# Patient Record
Sex: MALE | Race: WHITE | NOT HISPANIC OR LATINO | Employment: FULL TIME | URBAN - METROPOLITAN AREA
[De-identification: names, ages, dates, MRNs, and addresses within clinical notes are randomized per-mention and may not be internally consistent; named-entity substitution may affect disease eponyms.]

---

## 2017-01-27 DIAGNOSIS — I10 BENIGN ESSENTIAL HYPERTENSION: Primary | ICD-10-CM

## 2017-01-27 NOTE — TELEPHONE ENCOUNTER
Lisinopril 5      Last Written Prescription Date: 08/01/16  Last Fill Quantity: 90, # refills: 1  Last Office Visit with G, P or [facility] prescribing provider: 05/05/16       POTASSIUM   Date Value Ref Range Status   01/09/2016 4.4 3.4 - 5.3 mmol/L Final     CREATININE   Date Value Ref Range Status   01/09/2016 1.01 0.66 - 1.25 mg/dL Final     BP Readings from Last 3 Encounters:   07/09/16 138/85   05/10/16 127/81   05/09/16 165/92       Thank You!  Vesta Saldivar  [facility]  P: [phone] F:[phone]

## 2017-01-30 RX ORDER — LISINOPRIL 5 MG/1
5 TABLET ORAL DAILY
Qty: 90 TABLET | Refills: 0 | Status: SHIPPED | OUTPATIENT
Start: 2017-01-30 | End: 2017-04-17

## 2017-04-17 ENCOUNTER — OFFICE VISIT (OUTPATIENT)
Dept: FAMILY MEDICINE | Facility: CLINIC | Age: 42
End: 2017-04-17
Payer: COMMERCIAL

## 2017-04-17 VITALS
BODY MASS INDEX: 37.3 KG/M2 | WEIGHT: 300 LBS | SYSTOLIC BLOOD PRESSURE: 133 MMHG | TEMPERATURE: 97.9 F | HEIGHT: 75 IN | DIASTOLIC BLOOD PRESSURE: 88 MMHG | HEART RATE: 72 BPM

## 2017-04-17 DIAGNOSIS — Z00.00 ROUTINE HISTORY AND PHYSICAL EXAMINATION OF ADULT: Primary | ICD-10-CM

## 2017-04-17 DIAGNOSIS — Z23 ENCOUNTER FOR IMMUNIZATION: ICD-10-CM

## 2017-04-17 DIAGNOSIS — F43.21 GRIEF REACTION: ICD-10-CM

## 2017-04-17 DIAGNOSIS — I10 BENIGN ESSENTIAL HYPERTENSION: ICD-10-CM

## 2017-04-17 DIAGNOSIS — I10 ESSENTIAL HYPERTENSION: ICD-10-CM

## 2017-04-17 PROCEDURE — 90471 IMMUNIZATION ADMIN: CPT | Performed by: FAMILY MEDICINE

## 2017-04-17 PROCEDURE — 99396 PREV VISIT EST AGE 40-64: CPT | Mod: 25 | Performed by: FAMILY MEDICINE

## 2017-04-17 PROCEDURE — 99213 OFFICE O/P EST LOW 20 MIN: CPT | Mod: 25 | Performed by: FAMILY MEDICINE

## 2017-04-17 PROCEDURE — 90715 TDAP VACCINE 7 YRS/> IM: CPT | Performed by: FAMILY MEDICINE

## 2017-04-17 RX ORDER — LISINOPRIL 5 MG/1
5 TABLET ORAL DAILY
Qty: 90 TABLET | Refills: 3 | Status: SHIPPED | OUTPATIENT
Start: 2017-04-17 | End: 2018-04-26

## 2017-04-17 RX ORDER — METOPROLOL SUCCINATE 50 MG/1
50 TABLET, EXTENDED RELEASE ORAL DAILY
Qty: 90 TABLET | Refills: 3 | Status: SHIPPED | OUTPATIENT
Start: 2017-04-17 | End: 2018-04-26

## 2017-04-17 ASSESSMENT — ANXIETY QUESTIONNAIRES
IF YOU CHECKED OFF ANY PROBLEMS ON THIS QUESTIONNAIRE, HOW DIFFICULT HAVE THESE PROBLEMS MADE IT FOR YOU TO DO YOUR WORK, TAKE CARE OF THINGS AT HOME, OR GET ALONG WITH OTHER PEOPLE: NOT DIFFICULT AT ALL
3. WORRYING TOO MUCH ABOUT DIFFERENT THINGS: MORE THAN HALF THE DAYS
1. FEELING NERVOUS, ANXIOUS, OR ON EDGE: SEVERAL DAYS
5. BEING SO RESTLESS THAT IT IS HARD TO SIT STILL: SEVERAL DAYS
2. NOT BEING ABLE TO STOP OR CONTROL WORRYING: SEVERAL DAYS
GAD7 TOTAL SCORE: 9
6. BECOMING EASILY ANNOYED OR IRRITABLE: MORE THAN HALF THE DAYS
7. FEELING AFRAID AS IF SOMETHING AWFUL MIGHT HAPPEN: NOT AT ALL

## 2017-04-17 ASSESSMENT — PATIENT HEALTH QUESTIONNAIRE - PHQ9: 5. POOR APPETITE OR OVEREATING: MORE THAN HALF THE DAYS

## 2017-04-17 NOTE — NURSING NOTE
"Chief Complaint   Patient presents with     Physical     General physcial exam.     Hypertension     Recheck on blood pressure medications.       Initial /88  Pulse 72  Temp 97.9  F (36.6  C) (Tympanic)  Ht 6' 2.5\" (1.892 m)  Wt 300 lb (136.1 kg)  BMI 38 kg/m2 Estimated body mass index is 38 kg/(m^2) as calculated from the following:    Height as of this encounter: 6' 2.5\" (1.892 m).    Weight as of this encounter: 300 lb (136.1 kg).  Medication Reconciliation: complete  "

## 2017-04-17 NOTE — NURSING NOTE
"Chief Complaint   Patient presents with     Physical     General physcial exam.     Hypertension     Recheck on blood pressure medications.  At home blood pressure readings have ranged around 127/72 to 140/90.     Weight Problem     Has noticed he has gained about 15 pounds in the last 3 months.  He feels he has been eating less.  Has been under stress since January with his father's cancer diagnosis and passing.       Initial /88  Pulse 72  Temp 97.9  F (36.6  C) (Tympanic)  Ht 6' 2.5\" (1.892 m)  Wt 300 lb (136.1 kg)  BMI 38 kg/m2 Estimated body mass index is 38 kg/(m^2) as calculated from the following:    Height as of this encounter: 6' 2.5\" (1.892 m).    Weight as of this encounter: 300 lb (136.1 kg).  Medication Reconciliation: complete  "

## 2017-04-17 NOTE — PROGRESS NOTES
SUBJECTIVE:     CC: Mark Conn is an 41 year old male who presents for preventative health visit.     Healthy Habits:    Do you get at least three servings of calcium containing foods daily (dairy, green leafy vegetables, etc.)? yes    Amount of exercise or daily activities, outside of work: 1 day(s) per week, 30-45 min.    Problems taking medications regularly No    Medication side effects: No    Have you had an eye exam in the past two years? yes    Do you see a dentist twice per year? yes    Do you have sleep apnea, excessive snoring or daytime drowsiness?  Daytime drowsiness.      Chief Complaint   Patient presents with     Physical     General physcial exam.     Hypertension     Recheck on blood pressure medications.  At home blood pressure readings have ranged around 127/72 to 140/90.     Weight Problem     Has noticed he has gained about 15 pounds in the last 3 months.  He feels he has been eating less.  Has been under stress since January with his father's cancer diagnosis and passing.         Today's PHQ-2 Score:   PHQ-2 ( 1999 [employer]) 4/17/2017 4/14/2017   Q1: Little interest or pleasure in doing things 2 -   Q2: Feeling down, depressed or hopeless 2 -   PHQ-2 Score 4 -   Little interest or pleasure in doing things - More than half the days   Feeling down, depressed or hopeless - More than half the days   PHQ-2 Score - 4       Abuse: Current or Past(Physical, Sexual or Emotional)- No  Do you feel safe in your environment - Yes    Social History   Substance Use Topics     Smoking status: Never Smoker     Smokeless tobacco: Never Used     Alcohol use Yes      Comment: Sociallly- not very often.     The patient does not drink >3 drinks per day nor >7 drinks per week.    Last PSA: No results found for: PSA    Recent Labs   Lab Test  03/07/15   0709  11/24/12   0828   CHOL  160  162   HDL  31*  32*   LDL  72  97   TRIG  286*  170*   CHOLHDLRATIO  5.2*  5.0       Reviewed orders with patient. Reviewed  "health maintenance and updated orders accordingly - Yes    Reviewed and updated as needed this visit by clinical staff  Tobacco  Allergies  Med Hx  Surg Hx  Fam Hx  Soc Hx        Reviewed and updated as needed this visit by Provider    ROS:  C: NEGATIVE for fever, chills, change in weight  I: NEGATIVE for worrisome rashes, moles or lesions  E: NEGATIVE for vision changes or irritation  ENT: NEGATIVE for ear, mouth and throat problems  R: NEGATIVE for significant cough or SOB  CV: NEGATIVE for chest pain, palpitations or peripheral edema  GI: NEGATIVE for nausea, abdominal pain, heartburn, or change in bowel habits   male: negative for dysuria, hematuria, decreased urinary stream, erectile dysfunction, urethral discharge  M: NEGATIVE for significant arthralgias or myalgia  N: NEGATIVE for weakness, dizziness or paresthesias  PSYCHIATRIC: he has grief and mood changes from his father's illness and death last month.       OBJECTIVE:     /88  Pulse 72  Temp 97.9  F (36.6  C) (Tympanic)  Ht 6' 2.5\" (1.892 m)  Wt 300 lb (136.1 kg)  BMI 38 kg/m2  EXAM:  GENERAL APPEARANCE: healthy, alert and no distress  GENERAL APPEARANCE: over weight  EYES: EOMI,  PERRL  HENT: ear canals and TM's normal and nose and mouth without ulcers or lesions  NECK: no adenopathy, no asymmetry, masses, or scars and thyroid normal to palpation  RESP: lungs clear to auscultation - no rales, rhonchi or wheezes  CV: regular rates and rhythm, normal S1 S2, no S3 or S4 and no murmur, click or rub -  ABDOMEN:  soft, nontender, no HSM or masses and bowel sounds normal  GU_male: testicles normal without atrophy or masses, no hernias and penis normal without urethral discharge  MS: extremities normal- no gross deformities noted, no evidence of inflammation in joints, FROM in all extremities.  SKIN: no suspicious lesions or rashes  NEURO: Normal strength and tone, sensory exam grossly normal, mentation intact and speech normal  PSYCH: " "mentation appears normal and affect normal/bright  LYMPHATICS: No axillary, cervical, inguinal, or supraclavicular nodes      ASSESSMENT/PLAN:     1. Routine history and physical examination of adult  COUNSELING:  Reviewed preventive health counseling, as reflected in patient instructions       Regular exercise       Healthy diet/nutrition       Vision screening       Hearing screening   reports that he has never smoked. He has never used smokeless tobacco.  Estimated body mass index is 38 kg/(m^2) as calculated from the following:    Height as of this encounter: 6' 2.5\" (1.892 m).    Weight as of this encounter: 300 lb (136.1 kg).   Counseling Resources:  ATP IV Guidelines  Pooled Cohorts Equation Calculator  FRAX Risk Assessment  ICSI Preventive Guidelines  Dietary Guidelines for Americans, 2010  USDA's MyPlate  ASA Prophylaxis  Lung CA Screening   Have your family monitor for sleep apnea, and call our RN if this is occurring and we will order the sleep study.   For the outside BP, take this at rest with a calibrated machine. Avoid stimulants. Exercise and weight loss will help.     2. Grief reaction  We will refer for mental health counseling. Call if desired. We suggest the grief counseling, short term.   Call our clinic at [phone], or your insurance, or [place] central scheduling at [phone].  Avoid alcohol until better.   - MENTAL HEALTH REFERRAL    (I10) Benign essential hypertension  Comment:   Plan: lisinopril (PRINIVIL/ZESTRIL) 5 MG tablet,         **Creatinine FUTURE anytime, **Potassium FUTURE        anytime        Monitor and record the BP readings and the goal for the average is under 130/80. Use the meds and the non drug therapies.   Refill and recheck annually if doing well.     Henry Herrera MD  [facility]  "

## 2017-04-17 NOTE — MR AVS SNAPSHOT
After Visit Summary   4/17/2017    Mark Conn    MRN: [id number]           Patient Information     Date Of Birth          1975        Visit Information        Provider Department      4/17/2017 2:40 PM Henry Herrera MD [facility]        Today's Diagnoses     Routine history and physical examination of adult    -  1    Grief reaction        Benign essential hypertension        Essential hypertension          Care Instructions      Preventive Health Recommendations  Male Ages 40 to 49    Yearly exam:             See your health care provider every year in order to  o   Review health changes.   o   Discuss preventive care.    o   Review your medicines if your doctor has prescribed any.    You should be tested each year for STDs (sexually transmitted diseases) if you re at risk.     Have a cholesterol test every 5 years.     Have a colonoscopy (test for colon cancer) if someone in your family has had colon cancer or polyps before age 50.     After age 45, have a diabetes test (fasting glucose). If you are at risk for diabetes, you should have this test every 3 years.      Talk with your health care provider about whether or not a prostate cancer screening test (PSA) is right for you.    Shots: Get a flu shot each year. Get a tetanus shot every 10 years.     Nutrition:    Eat at least 5 servings of fruits and vegetables daily.     Eat whole-grain bread, whole-wheat pasta and brown rice instead of white grains and rice.     Talk to your provider about Calcium and Vitamin D.     Lifestyle    Exercise for at least 150 minutes a week (30 minutes a day, 5 days a week). This will help you control your weight and prevent disease.     Limit alcohol to one drink per day.     No smoking.     Wear sunscreen to prevent skin cancer.     See your dentist every six months for an exam and cleaning.              Thank you for choosing [facility].  You may be receiving a survey in the  "mail from [employer] regarding your visit today.  Please take a few minutes to complete and return the survey to let us know how we are doing.      If you have questions or concerns, please contact us via [employer] or you can contact your care team at [phone].    Our Clinic hours are:  Monday 6:40 am  to 7:00 pm  Tuesday -Friday 6:40 am to 5:00 pm    The [place] outpatient lab hours are:  Monday - Friday 6:10 am to 4:45 pm  Saturdays 7:00 am to 11:00 am  Appointments are required, call [phone]    If you have clinical questions after hours or would like to schedule an appointment,  call the clinic at [phone].    ASSESSMENT/PLAN:     1. Routine history and physical examination of adult  COUNSELING:  Reviewed preventive health counseling, as reflected in patient instructions       Regular exercise       Healthy diet/nutrition       Vision screening       Hearing screening   reports that he has never smoked. He has never used smokeless tobacco.  Estimated body mass index is 38 kg/(m^2) as calculated from the following:    Height as of this encounter: 6' 2.5\" (1.892 m).    Weight as of this encounter: 300 lb (136.1 kg).   Counseling Resources:  ATP IV Guidelines  Pooled Cohorts Equation Calculator  FRAX Risk Assessment  ICSI Preventive Guidelines  Dietary Guidelines for Americans, 2010  USDA's MyPlate  ASA Prophylaxis  Lung CA Screening   Have your family monitor for sleep apnea, and call our RN if this is occurring and we will order the sleep study.   For the outside BP, take this at rest with a calibrated machine. Avoid stimulants. Exercise and weight loss will help.     2. Grief reaction  We will refer for mental health counseling. Call if desired. We suggest the grief counseling, short term.   Call our clinic at [phone], or your insurance, or [facility] central scheduling at [phone].  Avoid alcohol until better.   - MENTAL HEALTH REFERRAL    (I10) Benign essential hypertension  Comment:   Plan: " lisinopril (PRINIVIL/ZESTRIL) 5 MG tablet,         **Creatinine FUTURE anytime, **Potassium FUTURE        anytime        Monitor and record the BP readings and the goal for the average is under 130/80. Use the meds and the non drug therapies.   Refill and recheck annually if doing well.           Follow-ups after your visit        Additional Services     MENTAL HEALTH REFERRAL       Your provider has referred you to: to be decided    All scheduling is subject to the client's specific insurance plan & benefits, provider/location availability, and provider clinical specialities.  Please arrive 15 minutes early for your first appointment and bring your completed paperwork.    Please be aware that coverage of these services is subject to the terms and limitations of your health insurance plan.  Call member services at your health plan with any benefit or coverage questions.                  Future tests that were ordered for you today     Open Future Orders        Priority Expected Expires Ordered    **Creatinine FUTURE anytime Routine 4/17/2017 4/17/2018 4/17/2017    **Potassium FUTURE anytime Routine 4/17/2017 4/17/2018 4/17/2017    **Glucose FUTURE anytime Routine 4/17/2017 4/17/2018 4/17/2017    **Lipid panel reflex to direct LDL FUTURE anytime Routine 4/17/2017 4/17/2018 4/17/2017    Uric acid Routine 4/17/2017 4/17/2018 4/17/2017            Who to contact     If you have questions or need follow up information about today's clinic visit or your schedule please contact [facility] directly at [phone].  Normal or non-critical lab and imaging results will be communicated to you by [employer], letter or phone within 4 business days after the clinic has received the results. If you do not hear from us within 7 days, please contact the clinic through [employer] or phone. If you have a critical or abnormal lab result, we will notify you by phone as soon as possible.  Submit refill requests through [employer] or  "call your pharmacy and they will forward the refill request to us. Please allow 3 business days for your refill to be completed.          Additional Information About Your Visit        [employer] Information     [employer] gives you secure access to your electronic health record. If you see a primary care provider, you can also send messages to your care team and make appointments. If you have questions, please call your primary care clinic.  If you do not have a primary care provider, please call [phone] and they will assist you.        Care EveryWhere ID     This is your Care EveryWhere ID. This could be used by other organizations to access your [facility] medical records  [id number]        Your Vitals Were     Pulse Temperature Height BMI (Body Mass Index)          72 97.9  F (36.6  C) (Tympanic) 6' 2.5\" (1.892 m) 38 kg/m2         Blood Pressure from Last 3 Encounters:   04/17/17 133/88   07/09/16 138/85   05/10/16 127/81    Weight from Last 3 Encounters:   04/17/17 300 lb (136.1 kg)   07/09/16 293 lb (132.9 kg)   05/20/16 293 lb (132.9 kg)              We Performed the Following     MENTAL HEALTH REFERRAL          Where to get your medicines      These medications were sent to [facility] [address] [address]     Phone:  [phone]     lisinopril 5 MG tablet    metoprolol 50 MG 24 hr tablet          Primary Care Provider Office Phone # Fax #    Henry Herrera [phone]       [facility] [address] [address]        Thank you!     Thank you for choosing [facility]  for your care. Our goal is always to provide you with excellent care. Hearing back from our patients is one way we can continue to improve our services. Please take a few minutes to complete the written survey that you may receive in the mail after your visit with us. Thank you!             Your Updated " Medication List - Protect others around you: Learn how to safely use, store and throw away your medicines at www.disposemymeds.org.          This list is accurate as of: 4/17/17  3:38 PM.  Always use your most recent med list.                   Brand Name Dispense Instructions for use    allopurinol 300 MG tablet    ZYLOPRIM    30 tablet    Take 1 tablet (300 mg) by mouth daily       ibuprofen 800 MG tablet    ADVIL/MOTRIN    60 tablet    Take 1 tablet (800 mg) by mouth 3 times daily (with meals)       indomethacin 50 MG capsule    INDOCIN    42 capsule    Take 1 capsule (50 mg) by mouth 3 times daily (with meals)       lisinopril 5 MG tablet    PRINIVIL/ZESTRIL    90 tablet    Take 1 tablet (5 mg) by mouth daily       metoprolol 50 MG 24 hr tablet    TOPROL-XL    90 tablet    Take 1 tablet (50 mg) by mouth daily       Multi-vitamin Tabs tablet      Take 1 tablet by mouth 2 times daily.       OMEGA-3 FISH OIL PO

## 2017-04-17 NOTE — PATIENT INSTRUCTIONS
Preventive Health Recommendations  Male Ages 40 to 49    Yearly exam:             See your health care provider every year in order to  o   Review health changes.   o   Discuss preventive care.    o   Review your medicines if your doctor has prescribed any.    You should be tested each year for STDs (sexually transmitted diseases) if you re at risk.     Have a cholesterol test every 5 years.     Have a colonoscopy (test for colon cancer) if someone in your family has had colon cancer or polyps before age 50.     After age 45, have a diabetes test (fasting glucose). If you are at risk for diabetes, you should have this test every 3 years.      Talk with your health care provider about whether or not a prostate cancer screening test (PSA) is right for you.    Shots: Get a flu shot each year. Get a tetanus shot every 10 years.     Nutrition:    Eat at least 5 servings of fruits and vegetables daily.     Eat whole-grain bread, whole-wheat pasta and brown rice instead of white grains and rice.     Talk to your provider about Calcium and Vitamin D.     Lifestyle    Exercise for at least 150 minutes a week (30 minutes a day, 5 days a week). This will help you control your weight and prevent disease.     Limit alcohol to one drink per day.     No smoking.     Wear sunscreen to prevent skin cancer.     See your dentist every six months for an exam and cleaning.              Thank you for choosing [facility].  You may be receiving a survey in the mail from [employer] regarding your visit today.  Please take a few minutes to complete and return the survey to let us know how we are doing.      If you have questions or concerns, please contact us via [employer] or you can contact your care team at [phone].    Our Clinic hours are:  Monday 6:40 am  to 7:00 pm  Tuesday -Friday 6:40 am to 5:00 pm    The [place] outpatient lab hours are:  Monday - Friday 6:10 am to 4:45 pm  Saturdays 7:00 am to 11:00 am  Appointments are  "required, call [phone]    If you have clinical questions after hours or would like to schedule an appointment,  call the clinic at [phone].    ASSESSMENT/PLAN:     1. Routine history and physical examination of adult  COUNSELING:  Reviewed preventive health counseling, as reflected in patient instructions       Regular exercise       Healthy diet/nutrition       Vision screening       Hearing screening   reports that he has never smoked. He has never used smokeless tobacco.  Estimated body mass index is 38 kg/(m^2) as calculated from the following:    Height as of this encounter: 6' 2.5\" (1.892 m).    Weight as of this encounter: 300 lb (136.1 kg).   Counseling Resources:  ATP IV Guidelines  Pooled Cohorts Equation Calculator  FRAX Risk Assessment  ICSI Preventive Guidelines  Dietary Guidelines for Americans, 2010  USDA's MyPlate  ASA Prophylaxis  Lung CA Screening   Have your family monitor for sleep apnea, and call our RN if this is occurring and we will order the sleep study.   For the outside BP, take this at rest with a calibrated machine. Avoid stimulants. Exercise and weight loss will help.     2. Grief reaction  We will refer for mental health counseling. Call if desired. We suggest the grief counseling, short term.   Call our clinic at [phone], or your insurance, or [place] central scheduling at [phone].  Avoid alcohol until better.   - MENTAL HEALTH REFERRAL    (I10) Benign essential hypertension  Comment:   Plan: lisinopril (PRINIVIL/ZESTRIL) 5 MG tablet,         **Creatinine FUTURE anytime, **Potassium FUTURE        anytime        Monitor and record the BP readings and the goal for the average is under 130/80. Use the meds and the non drug therapies.   Refill and recheck annually if doing well.     "

## 2017-04-18 ASSESSMENT — ANXIETY QUESTIONNAIRES: GAD7 TOTAL SCORE: 9

## 2017-04-18 ASSESSMENT — PATIENT HEALTH QUESTIONNAIRE - PHQ9: SUM OF ALL RESPONSES TO PHQ QUESTIONS 1-9: 9

## 2017-04-22 DIAGNOSIS — Z00.00 ROUTINE HISTORY AND PHYSICAL EXAMINATION OF ADULT: ICD-10-CM

## 2017-04-22 DIAGNOSIS — F43.21 GRIEF REACTION: ICD-10-CM

## 2017-04-22 DIAGNOSIS — I10 BENIGN ESSENTIAL HYPERTENSION: ICD-10-CM

## 2017-04-22 LAB
ANION GAP SERPL CALCULATED.3IONS-SCNC: 7 MMOL/L (ref 3–14)
BUN SERPL-MCNC: 15 MG/DL (ref 7–30)
CALCIUM SERPL-MCNC: 8.7 MG/DL (ref 8.5–10.1)
CHLORIDE SERPL-SCNC: 103 MMOL/L (ref 94–109)
CHOLEST SERPL-MCNC: 156 MG/DL
CO2 SERPL-SCNC: 27 MMOL/L (ref 20–32)
CREAT SERPL-MCNC: 1.03 MG/DL (ref 0.66–1.25)
GFR SERPL CREATININE-BSD FRML MDRD: 79 ML/MIN/1.7M2
GLUCOSE SERPL-MCNC: 104 MG/DL (ref 70–99)
HDLC SERPL-MCNC: 33 MG/DL
LDLC SERPL CALC-MCNC: 56 MG/DL
NONHDLC SERPL-MCNC: 123 MG/DL
POTASSIUM SERPL-SCNC: 4.1 MMOL/L (ref 3.4–5.3)
SODIUM SERPL-SCNC: 137 MMOL/L (ref 133–144)
TRIGL SERPL-MCNC: 336 MG/DL
URATE SERPL-MCNC: 5.5 MG/DL (ref 3.5–7.2)

## 2017-04-22 PROCEDURE — 80061 LIPID PANEL: CPT | Performed by: FAMILY MEDICINE

## 2017-04-22 PROCEDURE — 84550 ASSAY OF BLOOD/URIC ACID: CPT | Performed by: FAMILY MEDICINE

## 2017-04-22 PROCEDURE — 36415 COLL VENOUS BLD VENIPUNCTURE: CPT | Performed by: FAMILY MEDICINE

## 2017-04-22 PROCEDURE — 80048 BASIC METABOLIC PNL TOTAL CA: CPT | Performed by: FAMILY MEDICINE

## 2017-08-07 ENCOUNTER — OFFICE VISIT (OUTPATIENT)
Dept: FAMILY MEDICINE | Facility: CLINIC | Age: 42
End: 2017-08-07
Payer: COMMERCIAL

## 2017-08-07 VITALS
WEIGHT: 298 LBS | HEIGHT: 75 IN | BODY MASS INDEX: 37.05 KG/M2 | OXYGEN SATURATION: 96 % | DIASTOLIC BLOOD PRESSURE: 75 MMHG | HEART RATE: 76 BPM | TEMPERATURE: 98.3 F | SYSTOLIC BLOOD PRESSURE: 133 MMHG

## 2017-08-07 DIAGNOSIS — R06.09 DYSPNEA ON EXERTION: Primary | ICD-10-CM

## 2017-08-07 PROCEDURE — 99214 OFFICE O/P EST MOD 30 MIN: CPT | Performed by: FAMILY MEDICINE

## 2017-08-07 ASSESSMENT — ANXIETY QUESTIONNAIRES
7. FEELING AFRAID AS IF SOMETHING AWFUL MIGHT HAPPEN: NOT AT ALL
1. FEELING NERVOUS, ANXIOUS, OR ON EDGE: MORE THAN HALF THE DAYS
6. BECOMING EASILY ANNOYED OR IRRITABLE: MORE THAN HALF THE DAYS
2. NOT BEING ABLE TO STOP OR CONTROL WORRYING: SEVERAL DAYS
3. WORRYING TOO MUCH ABOUT DIFFERENT THINGS: SEVERAL DAYS
GAD7 TOTAL SCORE: 10
5. BEING SO RESTLESS THAT IT IS HARD TO SIT STILL: SEVERAL DAYS

## 2017-08-07 ASSESSMENT — PATIENT HEALTH QUESTIONNAIRE - PHQ9
SUM OF ALL RESPONSES TO PHQ QUESTIONS 1-9: 9
5. POOR APPETITE OR OVEREATING: NEARLY EVERY DAY

## 2017-08-07 NOTE — PROGRESS NOTES
SUBJECTIVE:                                                    Mark Conn is a 42 year old male who presents to clinic today for the following health issues:      SHORTNESS OF BREATH      Duration: Has noticed more in the past month.    Description (location/character/radiation): Shortness of breath with exertion.  Can have wheezing at times.  His wife noticed that as his first symptom.  He will feel fatigue where he will need to stop and rest.  This is not normal for him. The symptoms are predictable.     Intensity:  moderate    Accompanying signs and symptoms: Last week with the humidity it felt like he had a heavy/pressure feeling on his chest.  He works outside/inside.  Three times in the past month he will have random, sharp pain in the right rib area, on the backside.  This can feel like a bruise for a few minutes.  One time he was sitting in his chair at home.    Stress-he has been under stress this past year with his father passing away in March.  His father-in-law had a stroke in May.  They area having to drive often to visit him.    History (similar episodes/previous evaluation): None    Precipitating or alleviating factors: Stairs-where he has noticed symptoms the most.  He does deal with different chemicals at work.  They do wear a respirator, but he wants to make sure it is not related.    Therapies tried and outcome: None     Today the PHQ is 9, and the ESTHER is 10.     ESTHER-7   [employer], 2002; Used with Permission) 4/17/2017   1. Feeling nervous, anxious, or on edge 1   2. Not being able to stop or control worrying 1   3. Worrying too much about different things 2   4. Trouble relaxing 2   5. Being so restless that it is hard to sit still 1   6. Becoming easily annoyed or irritable 2   7. Feeling afraid, as if something awful might happen 0   ESTHER-7 Total Score 9   If you checked any problems, how difficult have they made it for you to do your work, take care of things at home, or get along with  "other people? Not difficult at all     PHQ-9 ([employer]) 4/17/2017   1.  Little interest or pleasure in doing things 1   2.  Feeling down, depressed, or hopeless 1   3.  Trouble falling or staying asleep, or sleeping too much 1   4.  Feeling tired or having little energy 2   5.  Poor appetite or overeating 2   6.  Feeling bad about yourself 0   7.  Trouble concentrating 1   8.  Moving slowly or restless 1   9.  Suicidal or self-harm thoughts 0   PHQ-9 Total Score 9   Difficulty at work, home, or with people Not difficult at all         Current Outpatient Prescriptions:      lisinopril (PRINIVIL/ZESTRIL) 5 MG tablet, Take 1 tablet (5 mg) by mouth daily, Disp: 90 tablet, Rfl: 3     metoprolol (TOPROL-XL) 50 MG 24 hr tablet, Take 1 tablet (50 mg) by mouth daily, Disp: 90 tablet, Rfl: 3     allopurinol (ZYLOPRIM) 300 MG tablet, Take 1 tablet (300 mg) by mouth daily, Disp: 30 tablet, Rfl: 11     indomethacin (INDOCIN) 50 MG capsule, Take 1 capsule (50 mg) by mouth 3 times daily (with meals) (Patient taking differently: Take 50 mg by mouth 3 times daily (with meals) ), Disp: 42 capsule, Rfl: 5     Omega-3 Fatty Acids (OMEGA-3 FISH OIL PO), , Disp: , Rfl:      ibuprofen (ADVIL,MOTRIN) 800 MG tablet, Take 1 tablet (800 mg) by mouth 3 times daily (with meals), Disp: 60 tablet, Rfl: 1     multivitamin, therapeutic with minerals (MULTI-VITAMIN) TABS, Take 1 tablet by mouth 2 times daily., Disp: , Rfl:     Patient Active Problem List   Diagnosis     Pain in limb     Colitis     Hypertriglyceridemia     Malabsorption of glucose     CARDIOVASCULAR SCREENING; LDL GOAL LESS THAN 130     Benign essential hypertension     Obesity     Chronic gout without tophus, unspecified cause, unspecified site       Blood pressure 133/75, pulse 76, temperature 98.3  F (36.8  C), temperature source Tympanic, height 6' 2.5\" (1.892 m), weight 298 lb (135.2 kg), SpO2 96 %.    Exam:  GENERAL APPEARANCE: healthy, alert and no distress  EYES: EOMI,  " PERRL  HENT: ear canals and TM's normal and nose and mouth without ulcers or lesions  NECK: no adenopathy, no asymmetry, masses, or scars and thyroid normal to palpation  RESP: lungs clear to auscultation - no rales, rhonchi or wheezes  CV: regular rates and rhythm, normal S1 S2, no S3 or S4 and no murmur, click or rub -  SKIN: no suspicious lesions or rashes  PSYCH: mentation appears normal and affect normal/bright    PFM is 430/640.       (R06.09) Dyspnea on exertion  (primary encounter diagnosis)  Comment:   Plan: Exercise Stress test, Echocardiogram Complete,         General PFT Lab (Please always keep checked),         Pulmonary Function Test        We discussed some of the possible causes. Try to identify triggers as in tobacco smoke, and mold and dust and pollen. Avoid irritants.   We ordered the heart tests and go there now or call [phone]. We will call the results. Call [phone] for the lung tests. If worse in any way recheck in clinic or the Emergency dept.   If all the tests are normal then a CT scan of the chest would be ordered.     Henry Herrera

## 2017-08-07 NOTE — NURSING NOTE
"Chief Complaint   Patient presents with     Shortness of Breath     Has noticed more in the past month.       Initial /75  Pulse 76  Temp 98.3  F (36.8  C) (Tympanic)  Ht 6' 2.5\" (1.892 m)  Wt 298 lb (135.2 kg)  SpO2 96%  BMI 37.75 kg/m2 Estimated body mass index is 37.75 kg/(m^2) as calculated from the following:    Height as of this encounter: 6' 2.5\" (1.892 m).    Weight as of this encounter: 298 lb (135.2 kg).  Medication Reconciliation: complete  "

## 2017-08-07 NOTE — MR AVS SNAPSHOT
After Visit Summary   8/7/2017    Mark Conn    MRN: [id number]           Patient Information     Date Of Birth          1975        Visit Information        Provider Department      8/7/2017 3:00 PM Henry Herrera MD [facility]        Today's Diagnoses     Dyspnea on exertion    -  1      Care Instructions          Thank you for choosing [facility].  You may be receiving a survey in the mail from [facility] regarding your visit today.  Please take a few minutes to complete and return the survey to let us know how we are doing.      If you have questions or concerns, please contact us via LightningBuy or you can contact your care team at [phone].    Our Clinic hours are:  Monday 6:40 am  to 7:00 pm  Tuesday -Friday 6:40 am to 5:00 pm    The [place] outpatient lab hours are:  Monday - Friday 6:10 am to 4:45 pm  Saturdays 7:00 am to 11:00 am  Appointments are required, call [phone]    If you have clinical questions after hours or would like to schedule an appointment,  call the clinic at [phone].  (R06.09) Dyspnea on exertion  (primary encounter diagnosis)  Comment:   Plan: Exercise Stress test, Echocardiogram Complete,         General PFT Lab (Please always keep checked),         Pulmonary Function Test        We discussed some of the possible causes. Try to identify triggers as in tobacco smoke, and mold and dust and pollen. Avoid irritants.   We ordered the heart tests and go there now or call [phone]. We will call the results. Call [phone] for the lung tests. If worse in any way recheck in clinic or the Emergency dept.   If all the tests are normal then a CT scan of the chest would be ordered.             Follow-ups after your visit        Future tests that were ordered for you today     Open Future Orders        Priority Expected Expires Ordered    Exercise Stress test Routine  9/21/2017 8/7/2017    Echocardiogram Complete Routine  8/7/2018  "8/7/2017    General PFT Lab (Please always keep checked) Routine  8/7/2018 8/7/2017    Pulmonary Function Test Routine  8/7/2018 8/7/2017            Who to contact     If you have questions or need follow up information about today's clinic visit or your schedule please contact [facility] directly at [phone].  Normal or non-critical lab and imaging results will be communicated to you by MyChart, letter or phone within 4 business days after the clinic has received the results. If you do not hear from us within 7 days, please contact the clinic through [email or web address] or phone. If you have a critical or abnormal lab result, we will notify you by phone as soon as possible.  Submit refill requests through DriftToIt or call your pharmacy and they will forward the refill request to us. Please allow 3 business days for your refill to be completed.          Additional Information About Your Visit        [email or web address] Information     DriftToIt gives you secure access to your electronic health record. If you see a primary care provider, you can also send messages to your care team and make appointments. If you have questions, please call your primary care clinic.  If you do not have a primary care provider, please call [phone] and they will assist you.        Care EveryWhere ID     This is your Care EveryWhere ID. This could be used by other organizations to access your [place] medical records  [id number]        Your Vitals Were     Pulse Temperature Height Pulse Oximetry BMI (Body Mass Index)       76 98.3  F (36.8  C) (Tympanic) 6' 2.5\" (1.892 m) 96% 37.75 kg/m2        Blood Pressure from Last 3 Encounters:   08/07/17 133/75   04/17/17 133/88   07/09/16 138/85    Weight from Last 3 Encounters:   08/07/17 298 lb (135.2 kg)   04/17/17 300 lb (136.1 kg)   07/09/16 293 lb (132.9 kg)               Primary Care Provider Office Phone # Fax #    Henry Herrera [phone]       [facility] " CENTER [address] [facility] [address]        Equal Access to Services     SILVIA CASAREZ : Hadii aad ku hadkodakraeann Sojuvenal, wabelenda luqlivanha, qairenata kamikeda rufinodionisioaugust, nhung chatmanadenikereyna wesley. So [facility] [phone].    ATENCIÓN: Si habla español, tiene a sweeney disposición servicios gratuitos de asistencia lingüística. Llame al [phone].    We comply with applicable federal civil rights laws and [place] laws. We do not discriminate on the basis of race, color, national origin, age, disability sex, sexual orientation or gender identity.            Thank you!     Thank you for choosing [facility]  for your care. Our goal is always to provide you with excellent care. Hearing back from our patients is one way we can continue to improve our services. Please take a few minutes to complete the written survey that you may receive in the mail after your visit with us. Thank you!             Your Updated Medication List - Protect others around you: Learn how to safely use, store and throw away your medicines at www.disposemymeds.org.          This list is accurate as of: 8/7/17  3:35 PM.  Always use your most recent med list.                   Brand Name Dispense Instructions for use Diagnosis    allopurinol 300 MG tablet    ZYLOPRIM    30 tablet    Take 1 tablet (300 mg) by mouth daily    Chronic gout without tophus, unspecified cause, unspecified site       ibuprofen 800 MG tablet    ADVIL/MOTRIN    60 tablet    Take 1 tablet (800 mg) by mouth 3 times daily (with meals)    Cervical radiculopathy       indomethacin 50 MG capsule    INDOCIN    42 capsule    Take 1 capsule (50 mg) by mouth 3 times daily (with meals)    Gout       lisinopril 5 MG tablet    PRINIVIL/ZESTRIL    90 tablet    Take 1 tablet (5 mg) by mouth daily    Benign essential hypertension       metoprolol 50 MG 24 hr tablet    TOPROL-XL    90 tablet    Take 1 tablet (50 mg) by mouth daily    Essential hypertension        Multi-vitamin Tabs tablet      Take 1 tablet by mouth 2 times daily.        OMEGA-3 FISH OIL PO

## 2017-08-07 NOTE — PATIENT INSTRUCTIONS
Thank you for choosing [facility].  You may be receiving a survey in the mail from [facility] regarding your visit today.  Please take a few minutes to complete and return the survey to let us know how we are doing.      If you have questions or concerns, please contact us via Guaranteach or you can contact your care team at [phone].    Our Clinic hours are:  Monday 6:40 am  to 7:00 pm  Tuesday -Friday 6:40 am to 5:00 pm    The [place] outpatient lab hours are:  Monday - Friday 6:10 am to 4:45 pm  Saturdays 7:00 am to 11:00 am  Appointments are required, call [phone]    If you have clinical questions after hours or would like to schedule an appointment,  call the clinic at [phone].  (R06.09) Dyspnea on exertion  (primary encounter diagnosis)  Comment:   Plan: Exercise Stress test, Echocardiogram Complete,         General PFT Lab (Please always keep checked),         Pulmonary Function Test        We discussed some of the possible causes. Try to identify triggers as in tobacco smoke, and mold and dust and pollen. Avoid irritants.   We ordered the heart tests and go there now or call [phone]. We will call the results. Call [phone] for the lung tests. If worse in any way recheck in clinic or the Emergency dept.   If all the tests are normal then a CT scan of the chest would be ordered.

## 2017-08-08 ASSESSMENT — ANXIETY QUESTIONNAIRES: GAD7 TOTAL SCORE: 10

## 2017-08-17 ENCOUNTER — HOSPITAL ENCOUNTER (OUTPATIENT)
Dept: RESPIRATORY THERAPY | Facility: CLINIC | Age: 42
End: 2017-08-17
Attending: FAMILY MEDICINE
Payer: COMMERCIAL

## 2017-08-17 ENCOUNTER — HOSPITAL ENCOUNTER (OUTPATIENT)
Dept: CARDIOLOGY | Facility: CLINIC | Age: 42
Discharge: HOME | End: 2017-08-17
Attending: FAMILY MEDICINE | Admitting: FAMILY MEDICINE
Payer: COMMERCIAL

## 2017-08-17 DIAGNOSIS — R06.09 DYSPNEA ON EXERTION: ICD-10-CM

## 2017-08-17 LAB — HGB BLD-MCNC: 16.5 G/DL (ref 13.3–17.7)

## 2017-08-17 PROCEDURE — 94060 EVALUATION OF WHEEZING: CPT

## 2017-08-17 PROCEDURE — 94726 PLETHYSMOGRAPHY LUNG VOLUMES: CPT

## 2017-08-17 PROCEDURE — 85018 HEMOGLOBIN: CPT | Performed by: FAMILY MEDICINE

## 2017-08-17 PROCEDURE — 25500064 ZZH RX 255 OP 636: Performed by: FAMILY MEDICINE

## 2017-08-17 PROCEDURE — 93017 CV STRESS TEST TRACING ONLY: CPT

## 2017-08-17 PROCEDURE — 93018 CV STRESS TEST I&R ONLY: CPT | Performed by: INTERNAL MEDICINE

## 2017-08-17 PROCEDURE — 93016 CV STRESS TEST SUPVJ ONLY: CPT | Performed by: INTERNAL MEDICINE

## 2017-08-17 PROCEDURE — 94726 PLETHYSMOGRAPHY LUNG VOLUMES: CPT | Mod: 26 | Performed by: INTERNAL MEDICINE

## 2017-08-17 PROCEDURE — 94729 DIFFUSING CAPACITY: CPT | Mod: 26 | Performed by: INTERNAL MEDICINE

## 2017-08-17 PROCEDURE — 94060 EVALUATION OF WHEEZING: CPT | Mod: 26 | Performed by: INTERNAL MEDICINE

## 2017-08-17 PROCEDURE — 25000125 ZZHC RX 250: Performed by: FAMILY MEDICINE

## 2017-08-17 PROCEDURE — 93306 TTE W/DOPPLER COMPLETE: CPT | Mod: 26 | Performed by: INTERNAL MEDICINE

## 2017-08-17 PROCEDURE — 94729 DIFFUSING CAPACITY: CPT

## 2017-08-17 PROCEDURE — 40000264 ECHO COMPLETE WITH OPTISON

## 2017-08-17 PROCEDURE — 36415 COLL VENOUS BLD VENIPUNCTURE: CPT | Performed by: FAMILY MEDICINE

## 2017-08-17 RX ORDER — ALBUTEROL SULFATE 0.83 MG/ML
2.5 SOLUTION RESPIRATORY (INHALATION) ONCE
Status: COMPLETED | OUTPATIENT
Start: 2017-08-17 | End: 2017-08-17

## 2017-08-17 RX ADMIN — HUMAN ALBUMIN MICROSPHERES AND PERFLUTREN 2 ML: 10; .22 INJECTION, SOLUTION INTRAVENOUS at 14:47

## 2017-08-17 RX ADMIN — ALBUTEROL SULFATE 2.5 MG: 2.5 SOLUTION RESPIRATORY (INHALATION) at 12:00

## 2017-08-22 LAB
DLCOUNC-PRED: 33.98 ML/MIN/MMHG
ERV-%PRED-PRE: 24 %
ERV-PRE: 0.3 L
ERV-PRED: 1.21 L
EXPTIME-PRE: 6.58 SEC
FEF2575-%PRED-POST: 39 %
FEF2575-%PRED-PRE: 50 %
FEF2575-POST: 1.73 L/SEC
FEF2575-PRE: 2.18 L/SEC
FEF2575-PRED: 4.35 L/SEC
FEFMAX-%PRED-PRE: 66 %
FEFMAX-PRE: 7.34 L/SEC
FEFMAX-PRED: 11.02 L/SEC
FEV1-%PRED-PRE: 54 %
FEV1-PRE: 2.54 L
FEV1FEV6-PRE: 78 %
FEV1FEV6-PRED: 81 %
FEV1FVC-PRE: 78 %
FEV1FVC-PRED: 79 %
FEV1SVC-PRE: 77 %
FEV1SVC-PRED: 77 %
FIFMAX-PRE: 3.8 L/SEC
FRCPLETH-%PRED-PRE: 82 %
FRCPLETH-PRE: 3.06 L
FRCPLETH-PRED: 3.7 L
FVC-%PRED-PRE: 55 %
FVC-PRE: 3.26 L
FVC-PRED: 5.92 L
IC-%PRED-PRE: 61 %
IC-PRE: 2.99 L
IC-PRED: 4.87 L
RVPLETH-%PRED-PRE: 126 %
RVPLETH-PRE: 2.75 L
RVPLETH-PRED: 2.18 L
TLCPLETH-%PRED-PRE: 76 %
TLCPLETH-PRE: 6.05 L
TLCPLETH-PRED: 7.94 L
VC-%PRED-PRE: 54 %
VC-PRE: 3.3 L
VC-PRED: 6.08 L

## 2017-08-30 PROBLEM — J98.4 RESTRICTIVE LUNG DISEASE: Status: ACTIVE | Noted: 2017-08-30

## 2017-08-31 ENCOUNTER — TELEPHONE (OUTPATIENT)
Dept: FAMILY MEDICINE | Facility: CLINIC | Age: 42
End: 2017-08-31

## 2017-08-31 DIAGNOSIS — R06.09 DYSPNEA ON EXERTION: Primary | ICD-10-CM

## 2017-08-31 NOTE — TELEPHONE ENCOUNTER
Patient called and agrees with pulmonology testing and is requesting to have a copy of the pulmonary function test from 8-17-17 available at the  for him, would like a call when it is available for . Pulmonology consult order placed per provider request on 8-30-17, need to let patient know this is done and when copy of PFT is ready for .  YEIMI Michelle

## 2017-09-05 DIAGNOSIS — M1A.9XX0 CHRONIC GOUT WITHOUT TOPHUS, UNSPECIFIED CAUSE, UNSPECIFIED SITE: ICD-10-CM

## 2017-09-06 NOTE — TELEPHONE ENCOUNTER
Allopurinal       Last Written Prescription Date: 09/07/2016  Last Fill Quantity: 30, # refills: 11  Last Office Visit with [facility] or [facility] prescribing provider:  08/07/2017        Uric Acid   Date Value Ref Range Status   04/22/2017 5.5 3.5 - 7.2 mg/dL Final   ]  Creatinine   Date Value Ref Range Status   04/22/2017 1.03 0.66 - 1.25 mg/dL Final   ]  Lab Results   Component Value Date    WBC 5.3 11/30/2014     Lab Results   Component Value Date    RBC 5.57 11/30/2014     Lab Results   Component Value Date    HGB 16.5 08/17/2017     Lab Results   Component Value Date    HCT 47.3 11/30/2014     No components found for: MCT  Lab Results   Component Value Date    MCV 85 11/30/2014     Lab Results   Component Value Date    MCH 29.4 11/30/2014     Lab Results   Component Value Date    MCHC 34.7 11/30/2014     Lab Results   Component Value Date    RDW 13.4 11/30/2014     Lab Results   Component Value Date     11/30/2014     Lab Results   Component Value Date    AST 31 02/19/2013     Lab Results   Component Value Date    ALT 46 02/19/2013       Yoan SETH (R)

## 2017-09-07 RX ORDER — ALLOPURINOL 300 MG/1
TABLET ORAL
Qty: 30 TABLET | Refills: 11 | Status: SHIPPED | OUTPATIENT
Start: 2017-09-07 | End: 2018-08-31

## 2017-09-07 NOTE — TELEPHONE ENCOUNTER
Routing refill request to provider for review/approval because:  Labs not current:    Last CBC was 11-30-14.           Lab Results   Component Value Date     AST 31 02/19/2013            Lab Results   Component Value Date     ALT 46 02/19/2013     Please advise refill.  YEIMI Michelle

## 2017-09-14 ENCOUNTER — MYC MEDICAL ADVICE (OUTPATIENT)
Dept: FAMILY MEDICINE | Facility: CLINIC | Age: 42
End: 2017-09-14

## 2017-09-22 DIAGNOSIS — R06.00 DYSPNEA: Primary | ICD-10-CM

## 2017-09-24 ASSESSMENT — ENCOUNTER SYMPTOMS
DYSPNEA ON EXERTION: 1
COUGH: 1
RESPIRATORY PAIN: 0
SHORTNESS OF BREATH: 1
HEMOPTYSIS: 0
SNORES LOUDLY: 1
POSTURAL DYSPNEA: 0
SPUTUM PRODUCTION: 1
COUGH DISTURBING SLEEP: 0
WHEEZING: 1

## 2017-09-25 ENCOUNTER — PRE VISIT (OUTPATIENT)
Dept: PULMONOLOGY | Facility: CLINIC | Age: 42
End: 2017-09-25

## 2017-09-25 NOTE — TELEPHONE ENCOUNTER
1.  Date/reason for appt: 10/2/17 Dyspnea on exertion pft    2.  Referring provider: MANUEL ROLDAN    3.  Call to patient (Yes / No - short description): No, referred     4.  Previous care at / records requested from:   [facility] (PFT results in epic)

## 2017-10-02 ENCOUNTER — OFFICE VISIT (OUTPATIENT)
Dept: PULMONOLOGY | Facility: CLINIC | Age: 42
End: 2017-10-02
Attending: INTERNAL MEDICINE
Payer: COMMERCIAL

## 2017-10-02 ENCOUNTER — HOSPITAL ENCOUNTER (OUTPATIENT)
Dept: CT IMAGING | Facility: CLINIC | Age: 42
Discharge: HOME | End: 2017-10-02
Attending: INTERNAL MEDICINE | Admitting: INTERNAL MEDICINE
Payer: COMMERCIAL

## 2017-10-02 VITALS
WEIGHT: 300 LBS | DIASTOLIC BLOOD PRESSURE: 68 MMHG | HEIGHT: 75 IN | HEART RATE: 66 BPM | SYSTOLIC BLOOD PRESSURE: 135 MMHG | BODY MASS INDEX: 37.3 KG/M2 | RESPIRATION RATE: 16 BRPM | OXYGEN SATURATION: 96 %

## 2017-10-02 DIAGNOSIS — I10 ESSENTIAL HYPERTENSION: ICD-10-CM

## 2017-10-02 DIAGNOSIS — R06.09 DYSPNEA ON EXERTION: ICD-10-CM

## 2017-10-02 DIAGNOSIS — Q67.6 PECTUS EXCAVATUM: ICD-10-CM

## 2017-10-02 DIAGNOSIS — R06.09 DYSPNEA ON EXERTION: Primary | ICD-10-CM

## 2017-10-02 DIAGNOSIS — R05.9 COUGH: ICD-10-CM

## 2017-10-02 DIAGNOSIS — E66.01 MORBID OBESITY (H): ICD-10-CM

## 2017-10-02 PROCEDURE — 25000128 H RX IP 250 OP 636: Performed by: RADIOLOGY

## 2017-10-02 PROCEDURE — 99211 OFF/OP EST MAY X REQ PHY/QHP: CPT | Mod: 25,ZF

## 2017-10-02 PROCEDURE — 25000125 ZZHC RX 250: Performed by: RADIOLOGY

## 2017-10-02 PROCEDURE — 71260 CT THORAX DX C+: CPT

## 2017-10-02 PROCEDURE — 99212 OFFICE O/P EST SF 10 MIN: CPT

## 2017-10-02 RX ORDER — IOPAMIDOL 755 MG/ML
80 INJECTION, SOLUTION INTRAVASCULAR ONCE
Status: COMPLETED | OUTPATIENT
Start: 2017-10-02 | End: 2017-10-02

## 2017-10-02 RX ADMIN — SODIUM CHLORIDE 80 ML: 9 INJECTION, SOLUTION INTRAVENOUS at 15:54

## 2017-10-02 RX ADMIN — IOPAMIDOL 80 ML: 755 INJECTION, SOLUTION INTRAVENOUS at 15:54

## 2017-10-02 ASSESSMENT — PAIN SCALES - GENERAL: PAINLEVEL: NO PAIN (0)

## 2017-10-02 NOTE — LETTER
10/2/2017       RE: Mark Conn  [address] [address] [place] [id number]     Dear Colleague,    Thank you for referring your patient, Mark Conn, to the [facility] at [facility]. Please see a copy of my visit note below.    Pulmonary Clinic New Patient Consult  Reason for Consult: Dyspnea on exertion  History of Present Illness    Mr. Conn is a 42 yom with h/o HTN who presents to pulmonary clinic today for further evaluation of dyspnea on exertion.  Mr. Conn reports that his dyspnea on exertion started in August but believes it has become worse within the last month. At present, he denies any functional limitations in terms of activities of daily living and is able to walk without having to stop and rest. He does say that he is unable to run anymore though. He becomes more short of breath with activity such as going up and down flights of stairs. He also endorses a cough which occurred around the same time that the shortness of breath did. It is dry or occasionally productive of a small amount of yellowish phlegm. He does state that he has seen rare blood streaks. These occur very infrequently. He also reports wheezing which appears to be worse in the morning upon waking or after work. He denies any chest pain, known cardiac disease, fevers, chills, shortness of breath at rest. He does report a weight gain of approximately 20 pounds over the course of the last year. He denies any history of recurrent pneumonia or history of childhood asthma. He does have a history of having pectus excavatum repaired in childhood. He has no family history of lung disease.    Cardiac workup to date has included a transthoracic echocardiogram from mid August which is notable for preserved left ventricular ejection fraction between 60-65%. There was normal RV function and normal valves. This study was unable to determine whether or not there was the presence  of diastolic dysfunction. The patient also went an exercise stress test which was stopped early due to dyspnea. The test was normal but noted a decrease in functional capacity of 20-30% (exercise time 7 minutes on Moises protocol).      He is a never smoker who lives in [place], [place] in a house that was built in 1999. He denies any known exposure to mold or asbestos. They have 2 dogs and a cat at home and the animals have never given him any respiratory difficulties. 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 He does state that he is exposed to some liquid and powder residence at his job. He wears his a respirator approximately 80% of the time. He does not that his symptoms improve if he is off work for one week. He otherwise denies exposure to birds, pillows or comforters stuff down feathers, hot tubs or Jacuzzi's that he uses on a recent basis, or travel outside the area.    He eats dinner nightly between 5 and 6 PM and goes to bed between 9:30 and 10. He denies any snacks, caffeine, or alcohol in proximity to bedtime. He denies any subjective symptoms of heartburn or acid reflux.      Review of Systems:  10 of 14 systems reviewed and are negative unless otherwise stated in HPI.    Past Medical History:   Diagnosis Date     Other acne        Past Surgical History:   Procedure Laterality Date     SURGICAL HISTORY OF -       Appendectomy     SURGICAL HISTORY OF -   age 2    Hernia - Right Inguinal     SURGICAL HISTORY OF -   age 3    Pectoral-Sternum Repair      SURGICAL HISTORY OF -   08/12/85    Tonsillectomy       Family History   Problem Relation Age of Onset     Hypertension Mother      Lipids Mother      DIABETES Mother      Thyroid Disease Father      Other Cancer Father 65     Gastric-spread to the bones, liver.     DIABETES Maternal Grandmother      Hypertension Maternal Grandfather      DIABETES Paternal Grandmother      Thyroid Disease Sister       "Hyperthyroid.       Social History     Social History     Marital status:      Spouse name: N/A     Number of children: N/A     Years of education: N/A     Social History Main Topics     Smoking status: Never Smoker     Smokeless tobacco: Never Used     Alcohol use Yes      Comment: Sociallly- not very often.     Drug use: No     Sexual activity: Yes     Other Topics Concern     Parent/Sibling W/ Cabg, Mi Or Angioplasty Before 65f 55m? No     Social History Narrative         Allergies   Allergen Reactions     Ampicillin Rash     Codeine Rash     Erythromycin Rash     Keflex [Cephalexin Monohydrate] Rash     Penicillins Rash     Sulfa Drugs Rash         Current Outpatient Prescriptions:      allopurinol (ZYLOPRIM) 300 MG tablet, TAKE ONE TABLET BY MOUTH EVERY DAY, Disp: 30 tablet, Rfl: 11     lisinopril (PRINIVIL/ZESTRIL) 5 MG tablet, Take 1 tablet (5 mg) by mouth daily, Disp: 90 tablet, Rfl: 3     metoprolol (TOPROL-XL) 50 MG 24 hr tablet, Take 1 tablet (50 mg) by mouth daily, Disp: 90 tablet, Rfl: 3     Omega-3 Fatty Acids (OMEGA-3 FISH OIL PO), , Disp: , Rfl:      ibuprofen (ADVIL,MOTRIN) 800 MG tablet, Take 1 tablet (800 mg) by mouth 3 times daily (with meals), Disp: 60 tablet, Rfl: 1     multivitamin, therapeutic with minerals (MULTI-VITAMIN) TABS, Take 1 tablet by mouth 2 times daily., Disp: , Rfl:      indomethacin (INDOCIN) 50 MG capsule, Take 1 capsule (50 mg) by mouth 3 times daily (with meals) (Patient not taking: Reported on 10/2/2017), Disp: 42 capsule, Rfl: 5      Physical Exam:  /68  Pulse 66  Resp 16  Ht 1.892 m (6' 2.5\")  Wt 136.1 kg (300 lb)  SpO2 96%  BMI 38 kg/m2  GENERAL: Well developed, well nourished, alert, and in no apparent distress.  HEENT: Normocephalic, atraumatic. PERRL, EOMI. Oral mucosa is moist. No perioral cyanosis.  NECK: supple, no masses, no thyromegaly.  RESP:  Normal respiratory effort.  CTAB.  No rales, wheezes, rhonchi.  Normal to percussion.  No cyanosis or " clubbing.  CV: Normal S1, S2, regular rhythm, normal rate. No murmur.  No LE edema.   ABDOMEN:  Soft, non-tender, non-distended.   SKIN: warm and dry. No rash.  NEURO: AAOx3.  Normal gait.  No focal neuro deficits.  PSYCH: mentation appears normal. and affect normal/bright    Results:  PFTs from 8/17: Ratio 78%, FVC 55%, FEV1 54%, ERV 24%, RV of 126%, DLCO 84%, TLC 76%. Study demonstrates a moderate disease severe restrictive ventilatory defect with preserved gas exchange.  There is no response to inhaled bronchodilators. There is no prior study available for comparison.  Imaging (personally reviewed in clinic today):  CXR: Stable size of the cardiomediastinal silhouette. There is no pleural effusion or pneumothorax prominence of the central pulmonary vascular structures the visualized upper abdomen is normal. Pectus deformity        Assessment and Plan:   Mark Conn is a 42 year old male with a history of hypertension who presents to pulmonary clinic today for further evaluation of dyspnea on exertion. The patient's pulmonary function testing is markedly abnormal, although it is difficult to tell if this is reflective of a true underlying parenchymal lung disease or this is reflective of chest wall restriction secondary to his pectus excavated and repair. I think the most reasonable next step would be to obtain a high-resolution CT of the chest to look for any underlying interstitial lung disease or any other primary parenchymal process which may help explain his shortness of breath. If the CT shows something abnormal then we can dictate further workup and management based on the results of the scan. If the CT returns within normal limits, then it is possible that his shortness of breath is due to deconditioning related to 20 pound weight gain over the course of the last year (BMI 38). It is also possible that he could have some degree of asthma or reactive airways disease which may be exacerbated by fumes  that he is exposed to at work. I have explained to him that this would be very difficult to concretely diagnose as bronchoprovocation testing is most useful for ruling out asthma/reactive airways disease and not actually diagnosing it.  In the interim, I have encouraged him to continue wearing his respirator at work and encouraged him to partake in regular aerobic exercise in an effort to lose weight.  I will be in touch with him pending the results of his forthcoming CT scan.  I will otherwise plan to see him back in clinic in approximately 3 months time with tur.  Questions and concerns were answered to the patient's satisfaction.  he was provided with my contact information should new questions or concerns arise in the interim.  He was offered a flu vaccine at the conclusion of today's appointment but declined stating he would get it with his family in the coming weeks.    Haily Orellana MD  Pulmonary and Critical Care Medicine

## 2017-10-02 NOTE — PROGRESS NOTES
Pulmonary Clinic New Patient Consult  Reason for Consult: Dyspnea on exertion  History of Present Illness    Mr. Conn is a 42 yom with h/o HTN who presents to pulmonary clinic today for further evaluation of dyspnea on exertion.  Mr. Conn reports that his dyspnea on exertion started in August but believes it has become worse within the last month. At present, he denies any functional limitations in terms of activities of daily living and is able to walk without having to stop and rest. He does say that he is unable to run anymore though. He becomes more short of breath with activity such as going up and down flights of stairs. He also endorses a cough which occurred around the same time that the shortness of breath did. It is dry or occasionally productive of a small amount of yellowish phlegm. He does state that he has seen rare blood streaks. These occur very infrequently. He also reports wheezing which appears to be worse in the morning upon waking or after work. He denies any chest pain, known cardiac disease, fevers, chills, shortness of breath at rest. He does report a weight gain of approximately 20 pounds over the course of the last year. He denies any history of recurrent pneumonia or history of childhood asthma. He does have a history of having pectus excavatum repaired in childhood. He has no family history of lung disease.    Cardiac workup to date has included a transthoracic echocardiogram from mid August which is notable for preserved left ventricular ejection fraction between 60-65%. There was normal RV function and normal valves. This study was unable to determine whether or not there was the presence of diastolic dysfunction. The patient also went an exercise stress test which was stopped early due to dyspnea. The test was normal but noted a decrease in functional capacity of 20-30% (exercise time 7 minutes on Moises protocol).      He is a never smoker who lives in [place], [place] in a  house that was built in 1999. He denies any known exposure to mold or asbestos. They have 2 dogs and a cat at home and the animals have never given him any respiratory difficulties. 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 He does state that he is exposed to some liquid and powder residence at his job. He wears his a respirator approximately 80% of the time. He does not that his symptoms improve if he is off work for one week. He otherwise denies exposure to birds, pillows or comforters stuff down feathers, hot tubs or Jacuzzi's that he uses on a recent basis, or travel outside the area.    He eats dinner nightly between 5 and 6 PM and goes to bed between 9:30 and 10. He denies any snacks, caffeine, or alcohol in proximity to bedtime. He denies any subjective symptoms of heartburn or acid reflux.      Review of Systems:  10 of 14 systems reviewed and are negative unless otherwise stated in HPI.    Past Medical History:   Diagnosis Date     Other acne        Past Surgical History:   Procedure Laterality Date     SURGICAL HISTORY OF -       Appendectomy     SURGICAL HISTORY OF -   age 2    Hernia - Right Inguinal     SURGICAL HISTORY OF -   age 3    Pectoral-Sternum Repair      SURGICAL HISTORY OF -   08/12/85    Tonsillectomy       Family History   Problem Relation Age of Onset     Hypertension Mother      Lipids Mother      DIABETES Mother      Thyroid Disease Father      Other Cancer Father 65     Gastric-spread to the bones, liver.     DIABETES Maternal Grandmother      Hypertension Maternal Grandfather      DIABETES Paternal Grandmother      Thyroid Disease Sister      Hyperthyroid.       Social History     Social History     Marital status:      Spouse name: N/A     Number of children: N/A     Years of education: N/A     Social History Main Topics     Smoking status: Never Smoker     Smokeless tobacco: Never Used     Alcohol use Yes      Comment: Sociallly- not  "very often.     Drug use: No     Sexual activity: Yes     Other Topics Concern     Parent/Sibling W/ Cabg, Mi Or Angioplasty Before 65f 55m? No     Social History Narrative         Allergies   Allergen Reactions     Ampicillin Rash     Codeine Rash     Erythromycin Rash     Keflex [Cephalexin Monohydrate] Rash     Penicillins Rash     Sulfa Drugs Rash         Current Outpatient Prescriptions:      allopurinol (ZYLOPRIM) 300 MG tablet, TAKE ONE TABLET BY MOUTH EVERY DAY, Disp: 30 tablet, Rfl: 11     lisinopril (PRINIVIL/ZESTRIL) 5 MG tablet, Take 1 tablet (5 mg) by mouth daily, Disp: 90 tablet, Rfl: 3     metoprolol (TOPROL-XL) 50 MG 24 hr tablet, Take 1 tablet (50 mg) by mouth daily, Disp: 90 tablet, Rfl: 3     Omega-3 Fatty Acids (OMEGA-3 FISH OIL PO), , Disp: , Rfl:      ibuprofen (ADVIL,MOTRIN) 800 MG tablet, Take 1 tablet (800 mg) by mouth 3 times daily (with meals), Disp: 60 tablet, Rfl: 1     multivitamin, therapeutic with minerals (MULTI-VITAMIN) TABS, Take 1 tablet by mouth 2 times daily., Disp: , Rfl:      indomethacin (INDOCIN) 50 MG capsule, Take 1 capsule (50 mg) by mouth 3 times daily (with meals) (Patient not taking: Reported on 10/2/2017), Disp: 42 capsule, Rfl: 5      Physical Exam:  /68  Pulse 66  Resp 16  Ht 1.892 m (6' 2.5\")  Wt 136.1 kg (300 lb)  SpO2 96%  BMI 38 kg/m2  GENERAL: Well developed, well nourished, alert, and in no apparent distress.  HEENT: Normocephalic, atraumatic. PERRL, EOMI. Oral mucosa is moist. No perioral cyanosis.  NECK: supple, no masses, no thyromegaly.  RESP:  Normal respiratory effort.  CTAB.  No rales, wheezes, rhonchi.  Normal to percussion.  No cyanosis or clubbing.  CV: Normal S1, S2, regular rhythm, normal rate. No murmur.  No LE edema.   ABDOMEN:  Soft, non-tender, non-distended.   SKIN: warm and dry. No rash.  NEURO: AAOx3.  Normal gait.  No focal neuro deficits.  PSYCH: mentation appears normal. and affect normal/bright    Results:  PFTs from 8/17: " Ratio 78%, FVC 55%, FEV1 54%, ERV 24%, RV of 126%, DLCO 84%, TLC 76%. Study demonstrates a moderate disease severe restrictive ventilatory defect with preserved gas exchange.  There is no response to inhaled bronchodilators. There is no prior study available for comparison.  Imaging (personally reviewed in clinic today):  CXR: Stable size of the cardiomediastinal silhouette. There is no pleural effusion or pneumothorax prominence of the central pulmonary vascular structures the visualized upper abdomen is normal. Pectus deformity        Assessment and Plan:   Mark Conn is a 42 year old male with a history of hypertension who presents to pulmonary clinic today for further evaluation of dyspnea on exertion. The patient's pulmonary function testing is markedly abnormal, although it is difficult to tell if this is reflective of a true underlying parenchymal lung disease or this is reflective of chest wall restriction secondary to his pectus excavated and repair. I think the most reasonable next step would be to obtain a high-resolution CT of the chest to look for any underlying interstitial lung disease or any other primary parenchymal process which may help explain his shortness of breath. If the CT shows something abnormal then we can dictate further workup and management based on the results of the scan. If the CT returns within normal limits, then it is possible that his shortness of breath is due to deconditioning related to 20 pound weight gain over the course of the last year (BMI 38). It is also possible that he could have some degree of asthma or reactive airways disease which may be exacerbated by fumes that he is exposed to at work. I have explained to him that this would be very difficult to concretely diagnose as bronchoprovocation testing is most useful for ruling out asthma/reactive airways disease and not actually diagnosing it.  In the interim, I have encouraged him to continue wearing his  respirator at work and encouraged him to partake in regular aerobic exercise in an effort to lose weight.  I will be in touch with him pending the results of his forthcoming CT scan.  I will otherwise plan to see him back in clinic in approximately 3 months time with tru.  Questions and concerns were answered to the patient's satisfaction.  he was provided with my contact information should new questions or concerns arise in the interim.  He was offered a flu vaccine at the conclusion of today's appointment but declined stating he would get it with his family in the coming weeks.    Haily Orellana MD  Pulmonary and Critical Care Medicine

## 2017-10-02 NOTE — MR AVS SNAPSHOT
After Visit Summary   10/2/2017    Mark Conn    MRN: [id number]           Patient Information     Date Of Birth          1975        Visit Information        Provider Department      10/2/2017 7:00 AM Vielka Orellana MD [facility]        Today's Diagnoses     Dyspnea on exertion    -  1       Follow-ups after your visit        Follow-up notes from your care team     Return in about 3 months (around 1/2/2018).      Your next 10 appointments already scheduled     Oct 02, 2017  4:00 PM CDT   CT CHEST W CONTRAST with [facility]CT1   [facility] CT ([facility])    [address] [place] [address]   [phone]           Please bring any scans or X-rays taken at other hospitals, if similar tests were done. Also bring a list of your medicines, including vitamins, minerals and over-the-counter drugs. It is safest to leave personal items at home.  Be sure to tell your doctor:   If you have any allergies.   If there s any chance you are pregnant.   If you are breastfeeding.   If you have any special needs.  You will have contrast for this exam. To prepare:   Do not eat or drink for 2 hours before your exam. If you need to take medicine, you may take it with small sips of water. (We may ask you to take liquid medicine as well.)   The day before your exam, drink extra fluids at least six 8-ounce glasses (unless your doctor tells you to restrict your fluids).  Patients over 70 or patients with diabetes or kidney problems:   If you haven t had a blood test (creatinine test) within the last 30 days, go to your clinic or Diagnostic Imaging Department for this test.  If you have diabetes:   If your kidney function is normal, continue taking your metformin (Avandamet, Glucophage, Glucovance, Metaglip) on the day of your exam.   If your kidney function is abnormal, wait 48 hours before restarting this medicine.  Please wear loose clothing, such as a  sweat suit or jogging clothes. Avoid snaps, zippers and other metal. We may ask you to undress and put on a hospital gown.  If you have any questions, please call the Imaging Department where you will have your exam.            Jan 08, 2018  6:30 AM CST   PFT VISIT with NATASHA PFL EUGENIA   [facility] Pulmonary Function Testing ([facility])    [address] [address] [phone]            Jan 08, 2018  7:00 AM CST   (Arrive by 6:45 AM)   Return Visit with Vielka Orellana MD   [facility] ([facility])    [address] [address] [phone]              Future tests that were ordered for you today     Open Future Orders        Priority Expected Expires Ordered    Spirometry, Breathing Capacity Routine  11/1/2017 10/2/2017    CT Chest w contrast* Routine  10/2/2018 10/2/2017            Who to contact     If you have questions or need follow up information about today's clinic visit or your schedule please contact [facility] directly at [phone].  Normal or non-critical lab and imaging results will be communicated to you by MyChart, letter or phone within 4 business days after the clinic has received the results. If you do not hear from us within 7 days, please contact the clinic through Admittedlyhart or phone. If you have a critical or abnormal lab result, we will notify you by phone as soon as possible.  Submit refill requests through Inter[employer] or call your pharmacy and they will forward the refill request to us. Please allow 3 business days for your refill to be completed.          Additional Information About Your Visit        Intervolve Information     Inter[employer]olve gives you secure access to your electronic health record. If you see a primary care provider, you can also send messages to your care team and make appointments. If you have questions,  "please call your primary care clinic.  If you do not have a primary care provider, please call [phone] and they will assist you.        Care EveryWhere ID     This is your Care EveryWhere ID. This could be used by other organizations to access your [place] medical records  [id number]        Your Vitals Were     Pulse Respirations Height Pulse Oximetry BMI (Body Mass Index)       66 16 1.892 m (6' 2.5\") 96% 38 kg/m2        Blood Pressure from Last 3 Encounters:   10/02/17 135/68   08/07/17 133/75   04/17/17 133/88    Weight from Last 3 Encounters:   10/02/17 136.1 kg (300 lb)   08/07/17 135.2 kg (298 lb)   04/17/17 136.1 kg (300 lb)               Primary Care Provider Office Phone # Fax #    Henry Paolo Herrera [phone] [address] [facility] [address]        Equal Access to Services     SILVIA CASAREZ : Hadii aad ku hadasho Soomaali, waaxda luqadaha, qaybta kaalmada adeegyada, waxay idiin haykimberlyn rufino bae . So [facility] [phone].    ATENCIÓN: Si habla español, tiene a sweeney disposición servicios gratuitos de asistencia lingüística. Ll[facility] [phone].    We comply with applicable federal civil rights laws and [place] laws. We do not discriminate on the basis of race, color, national origin, age, disability, sex, sexual orientation, or gender identity.            Thank you!     Thank you for choosing [facility]  for your care. Our goal is always to provide you with excellent care. Hearing back from our patients is one way we can continue to improve our services. Please take a few minutes to complete the written survey that you may receive in the mail after your visit with us. Thank you!             Your Updated Medication List - Protect others around you: Learn how to safely use, store and throw away your medicines at www.disposemymeds.org.          This list is accurate as of: 10/2/17  7:42 AM.  Always use your most recent med list. "                   Brand Name Dispense Instructions for use Diagnosis    allopurinol 300 MG tablet    ZYLOPRIM    30 tablet    TAKE ONE TABLET BY MOUTH EVERY DAY    Chronic gout without tophus, unspecified cause, unspecified site       ibuprofen 800 MG tablet    ADVIL/MOTRIN    60 tablet    Take 1 tablet (800 mg) by mouth 3 times daily (with meals)    Cervical radiculopathy       indomethacin 50 MG capsule    INDOCIN    42 capsule    Take 1 capsule (50 mg) by mouth 3 times daily (with meals)    Gout       lisinopril 5 MG tablet    PRINIVIL/ZESTRIL    90 tablet    Take 1 tablet (5 mg) by mouth daily    Benign essential hypertension       metoprolol 50 MG 24 hr tablet    TOPROL-XL    90 tablet    Take 1 tablet (50 mg) by mouth daily    Essential hypertension       Multi-vitamin Tabs tablet      Take 1 tablet by mouth 2 times daily.        OMEGA-3 FISH OIL PO

## 2017-10-02 NOTE — NURSING NOTE
Chief Complaint   Patient presents with     Consult     New consult on Kamran and his Dyspnea     Shadi Zayas CMA at 6:48 AM on 10/2/2017

## 2017-10-03 ENCOUNTER — TELEPHONE (OUTPATIENT)
Dept: PULMONOLOGY | Facility: CLINIC | Age: 42
End: 2017-10-03

## 2017-10-03 DIAGNOSIS — R06.09 DOE (DYSPNEA ON EXERTION): ICD-10-CM

## 2017-10-03 DIAGNOSIS — J45.20 MILD INTERMITTENT ASTHMA WITHOUT COMPLICATION: Primary | ICD-10-CM

## 2017-10-03 RX ORDER — ALBUTEROL SULFATE 90 UG/1
2 AEROSOL, METERED RESPIRATORY (INHALATION) EVERY 6 HOURS PRN
Qty: 3 INHALER | Refills: 1 | Status: SHIPPED | OUTPATIENT
Start: 2017-10-03 | End: 2019-01-25

## 2017-10-03 RX ORDER — FLUTICASONE PROPIONATE 110 UG/1
1 AEROSOL, METERED RESPIRATORY (INHALATION) 2 TIMES DAILY
Qty: 12 G | Refills: 3 | Status: SHIPPED | OUTPATIENT
Start: 2017-10-03 | End: 2017-10-04

## 2017-10-03 NOTE — TELEPHONE ENCOUNTER
Kamran returned Dr. Orellana's call.  Writer reviewed CT results and recommendations for follow up.  Kamran would like to try either albuterol or ICS, whichever MD feels may be more appropriate at this time, Rx can be sent to [facility] pharmacy.  Pt verbalized understanding and agreement with plan and has no further questions at this time.

## 2017-10-03 NOTE — TELEPHONE ENCOUNTER
Message left for pt to advise new medications per MD.  Clinic call back number provided for questions or concerns.

## 2017-10-03 NOTE — TELEPHONE ENCOUNTER
Rx provided for both to desired pharmacy.  Recommend initiation of low dose Flovent as prescribed for asthma maintenance with albuterol as needed for SOB/wheezing or to use 20 minutes before exercise.  Nursing-- please let him know.    Thanks.     [phone]

## 2017-10-03 NOTE — TELEPHONE ENCOUNTER
Left patient VM regarding results of chest CT.  CT results as follows:    1) Generally normal scan  2) Band like area of scarring in RML which could be related to old pectus repair or could represent scarring from old infection.  Is of little consequence now.  3) Mild air trapping which could be seen in asthma.      If he wishes, trial of albuterol or low dose ICS would be appropriate to see if this helps symptoms.  Otherwise no further work up is required at this time and he should f/u as scheduled.  Left number to return call to our clinic nurses.    Haily Orellana MD  Pulmonary and Critical Care Medicine

## 2017-10-04 ENCOUNTER — TELEPHONE (OUTPATIENT)
Dept: PULMONOLOGY | Facility: CLINIC | Age: 42
End: 2017-10-04

## 2017-10-04 DIAGNOSIS — J45.30 MILD PERSISTENT ASTHMA WITHOUT COMPLICATION: Primary | ICD-10-CM

## 2017-12-01 ENCOUNTER — ALLIED HEALTH/NURSE VISIT (OUTPATIENT)
Dept: FAMILY MEDICINE | Facility: CLINIC | Age: 42
End: 2017-12-01
Payer: COMMERCIAL

## 2017-12-01 DIAGNOSIS — Z23 NEED FOR PROPHYLACTIC VACCINATION AND INOCULATION AGAINST INFLUENZA: Primary | ICD-10-CM

## 2017-12-01 PROCEDURE — 90471 IMMUNIZATION ADMIN: CPT

## 2017-12-01 PROCEDURE — 99207 ZZC NO CHARGE NURSE ONLY: CPT

## 2017-12-01 PROCEDURE — 90686 IIV4 VACC NO PRSV 0.5 ML IM: CPT

## 2017-12-01 NOTE — MR AVS SNAPSHOT
After Visit Summary   12/1/2017    Mark Conn    MRN: [id number]           Patient Information     Date Of Birth          1975        Visit Information        Provider Department      12/1/2017 4:00 PM FL NB NILE/LPN [facility]        Today's Diagnoses     Need for prophylactic vaccination and inoculation against influenza    -  1       Follow-ups after your visit        Your next 10 appointments already scheduled     Jan 18, 2018  2:00 PM CST   PFT VISIT with NATASHA PFL NENO   [facility] Pulmonary Function Testing ([facility])    [address] [place] [address] [phone]            Jan 18, 2018  2:30 PM CST   (Arrive by 2:15 PM)   Return Visit with Vielka Orellana MD   [facility] ([facility])    [address] [place] [id number] [phone]              Who to contact     If you have questions or need follow up information about today's clinic visit or your schedule please contact [facility] directly at [phone].  Normal or non-critical lab and imaging results will be communicated to you by Saqinahart, letter or phone within 4 business days after the clinic has received the results. If you do not hear from us within 7 days, please contact the clinic through ralalit or phone. If you have a critical or abnormal lab result, we will notify you by phone as soon as possible.  Submit refill requests through [employer] or call your pharmacy and they will forward the refill request to us. Please allow 3 business days for your refill to be completed.          Additional Information About Your Visit        Saqinahart Information     [employer] gives you secure access to your electronic health record. If you see a primary care provider, you can also send messages to your care team and make appointments. If you have questions, please call  your primary care clinic.  If you do not have a primary care provider, please call [phone] and they will assist you.        Care EveryWhere ID     This is your Care EveryWhere ID. This could be used by other organizations to access your [place] medical records  [id number]         Blood Pressure from Last 3 Encounters:   10/02/17 135/68   08/07/17 133/75   04/17/17 133/88    Weight from Last 3 Encounters:   10/02/17 300 lb (136.1 kg)   08/07/17 298 lb (135.2 kg)   04/17/17 300 lb (136.1 kg)              We Performed the Following     FLU VAC, SPLIT VIRUS IM > 3 YO (QUADRIVALENT) [11653]        Primary Care Provider Office Phone # Fax #    Henry Herrera [phone] [address] [facility] [address]        Equal Access to Services     SERAFIN CASAREZ : Hadii aad ku hadasho Sobeatrisali, waaxda luqadaha, qaybta kaalmada adeegyada, waxay christoferin haykimberlyn rufino bae . So [facility] [phone].    ATENCIÓN: Si habla español, tiene a sweeney disposición servicios gratuitos de asistencia lingüística. Llame al [phone].    We comply with applicable federal civil rights laws and [place] laws. We do not discriminate on the basis of race, color, national origin, age, disability, sex, sexual orientation, or gender identity.            Thank you!     Thank you for choosing [facility]  for your care. Our goal is always to provide you with excellent care. Hearing back from our patients is one way we can continue to improve our services. Please take a few minutes to complete the written survey that you may receive in the mail after your visit with us. Thank you!             Your Updated Medication List - Protect others around you: Learn how to safely use, store and throw away your medicines at www.disposemymeds.org.          This list is accurate as of: 12/1/17  4:46 PM.  Always use your most recent med list.                   Brand Name Dispense Instructions for use Diagnosis     albuterol 108 (90 BASE) MCG/ACT Inhaler    PROAIR HFA/PROVENTIL HFA/VENTOLIN HFA    3 Inhaler    Inhale 2 puffs into the lungs every 6 hours as needed for shortness of breath / dyspnea or wheezing    Mild intermittent asthma without complication       allopurinol 300 MG tablet    ZYLOPRIM    30 tablet    TAKE ONE TABLET BY MOUTH EVERY DAY    Chronic gout without tophus, unspecified cause, unspecified site       beclomethasone 40 MCG/ACT Inhaler    QVAR    3 Inhaler    Inhale 2 puffs into the lungs 2 times daily    Mild persistent asthma without complication       ibuprofen 800 MG tablet    ADVIL/MOTRIN    60 tablet    Take 1 tablet (800 mg) by mouth 3 times daily (with meals)    Cervical radiculopathy       indomethacin 50 MG capsule    INDOCIN    42 capsule    Take 1 capsule (50 mg) by mouth 3 times daily (with meals)    Gout       lisinopril 5 MG tablet    PRINIVIL/ZESTRIL    90 tablet    Take 1 tablet (5 mg) by mouth daily    Benign essential hypertension       metoprolol 50 MG 24 hr tablet    TOPROL-XL    90 tablet    Take 1 tablet (50 mg) by mouth daily    Essential hypertension       Multi-vitamin Tabs tablet      Take 1 tablet by mouth 2 times daily.        OMEGA-3 FISH OIL PO

## 2017-12-01 NOTE — PROGRESS NOTES
Injectable Influenza Immunization Documentation    1.  Is the person to be vaccinated sick today?   No    2. Does the person to be vaccinated have an allergy to a component   of the vaccine?   No  Egg Allergy Algorithm Link    3. Has the person to be vaccinated ever had a serious reaction   to influenza vaccine in the past?   No    4. Has the person to be vaccinated ever had Guillain-Barré syndrome?   No    Form completed by

## 2018-01-15 ASSESSMENT — ENCOUNTER SYMPTOMS
SHORTNESS OF BREATH: 1
HEMOPTYSIS: 0
SPUTUM PRODUCTION: 0
POSTURAL DYSPNEA: 0
SNORES LOUDLY: 0
COUGH: 0
DYSPNEA ON EXERTION: 1
COUGH DISTURBING SLEEP: 0
WHEEZING: 1

## 2018-01-18 ENCOUNTER — OFFICE VISIT (OUTPATIENT)
Dept: PULMONOLOGY | Facility: CLINIC | Age: 43
End: 2018-01-18
Payer: COMMERCIAL

## 2018-01-18 VITALS
SYSTOLIC BLOOD PRESSURE: 141 MMHG | OXYGEN SATURATION: 96 % | DIASTOLIC BLOOD PRESSURE: 81 MMHG | RESPIRATION RATE: 16 BRPM | HEART RATE: 70 BPM

## 2018-01-18 DIAGNOSIS — J45.20 MILD INTERMITTENT ASTHMA, UNSPECIFIED WHETHER COMPLICATED: ICD-10-CM

## 2018-01-18 DIAGNOSIS — Q67.6 PECTUS EXCAVATUM: ICD-10-CM

## 2018-01-18 DIAGNOSIS — J98.4 RESTRICTIVE LUNG DISEASE: Primary | ICD-10-CM

## 2018-01-18 DIAGNOSIS — R06.00 DYSPNEA: Primary | ICD-10-CM

## 2018-01-18 DIAGNOSIS — E66.812 CLASS 2 OBESITY WITHOUT SERIOUS COMORBIDITY WITH BODY MASS INDEX (BMI) OF 38.0 TO 38.9 IN ADULT, UNSPECIFIED OBESITY TYPE: ICD-10-CM

## 2018-01-18 PROCEDURE — G0463 HOSPITAL OUTPT CLINIC VISIT: HCPCS | Mod: ZF

## 2018-01-18 ASSESSMENT — PAIN SCALES - GENERAL: PAINLEVEL: NO PAIN (0)

## 2018-01-18 NOTE — MR AVS SNAPSHOT
After Visit Summary   1/18/2018    Mark Conn    MRN: [id number]           Patient Information     Date Of Birth          1975        Visit Information        Provider Department      1/18/2018 2:30 PM Vielka Orellana MD [facility]        Today's Diagnoses     Restrictive lung disease    -  1    Class 2 obesity without serious comorbidity with body mass index (BMI) of 38.0 to 38.9 in adult, unspecified obesity type        Mild intermittent asthma, unspecified whether complicated        Pectus excavatum           Follow-ups after your visit        Follow-up notes from your care team     Return in about 6 months (around 7/18/2018).      Who to contact     If you have questions or need follow up information about today's clinic visit or your schedule please contact [facility] directly at [phone].  Normal or non-critical lab and imaging results will be communicated to you by Pica8hart, letter or phone within 4 business days after the clinic has received the results. If you do not hear from us within 7 days, please contact the clinic through Pica8hart or phone. If you have a critical or abnormal lab result, we will notify you by phone as soon as possible.  Submit refill requests through Jounce or call your pharmacy and they will forward the refill request to us. Please allow 3 business days for your refill to be completed.          Additional Information About Your Visit        MyChart Information     Jounce gives you secure access to your electronic health record. If you see a primary care provider, you can also send messages to your care team and make appointments. If you have questions, please call your primary care clinic.  If you do not have a primary care provider, please call [phone] and they will assist you.        Care EveryWhere ID     This is your Care EveryWhere ID. This could be used by other organizations  to access your [facility] medical records  [id number]        Your Vitals Were     Pulse Respirations Pulse Oximetry             70 16 96%          Blood Pressure from Last 3 Encounters:   01/18/18 141/81   10/02/17 135/68   08/07/17 133/75    Weight from Last 3 Encounters:   10/02/17 136.1 kg (300 lb)   08/07/17 135.2 kg (298 lb)   04/17/17 136.1 kg (300 lb)              Today, you had the following     No orders found for display       Primary Care Provider Office Phone # Fax #    Henry Paolo Herrera [phone] [address] [address]        Equal Access to Services     SILVIA CASAREZ : Khari Robertson, wacarlton serrato, qaybta kaalmada natalie, nhung wesley. So [facility] [phone].    ATENCIÓN: Si habla español, tiene a sweeney disposición servicios gratuitos de asistencia lingüística. Llame al [phone].    We comply with applicable federal civil rights laws and [place] laws. We do not discriminate on the basis of race, color, national origin, age, disability, sex, sexual orientation, or gender identity.            Thank you!     Thank you for choosing [facility]  for your care. Our goal is always to provide you with excellent care. Hearing back from our patients is one way we can continue to improve our services. Please take a few minutes to complete the written survey that you may receive in the mail after your visit with us. Thank you!             Your Updated Medication List - Protect others around you: Learn how to safely use, store and throw away your medicines at www.disposemymeds.org.          This list is accurate as of: 1/18/18  3:36 PM.  Always use your most recent med list.                   Brand Name Dispense Instructions for use Diagnosis    albuterol 108 (90 BASE) MCG/ACT Inhaler    PROAIR HFA/PROVENTIL HFA/VENTOLIN HFA    3 Inhaler    Inhale 2 puffs into the lungs every 6 hours as needed  for shortness of breath / dyspnea or wheezing    Mild intermittent asthma without complication       allopurinol 300 MG tablet    ZYLOPRIM    30 tablet    TAKE ONE TABLET BY MOUTH EVERY DAY    Chronic gout without tophus, unspecified cause, unspecified site       beclomethasone 40 MCG/ACT Inhaler    QVAR    3 Inhaler    Inhale 2 puffs into the lungs 2 times daily    Mild persistent asthma without complication       ibuprofen 800 MG tablet    ADVIL/MOTRIN    60 tablet    Take 1 tablet (800 mg) by mouth 3 times daily (with meals)    Cervical radiculopathy       indomethacin 50 MG capsule    INDOCIN    42 capsule    Take 1 capsule (50 mg) by mouth 3 times daily (with meals)    Gout       lisinopril 5 MG tablet    PRINIVIL/ZESTRIL    90 tablet    Take 1 tablet (5 mg) by mouth daily    Benign essential hypertension       metoprolol succinate 50 MG 24 hr tablet    TOPROL-XL    90 tablet    Take 1 tablet (50 mg) by mouth daily    Essential hypertension       Multi-vitamin Tabs tablet      Take 1 tablet by mouth 2 times daily.        OMEGA-3 FISH OIL PO

## 2018-01-18 NOTE — PROGRESS NOTES
Pulmonary Clinic Return Visit  History of Present Illness    Mr. Conn is a 42 yom with h/o HTN who presents to pulmonary clinic today for follow up of dyspnea on exertion.  To briefly review, Mr. Conn reports that his dyspnea on exertion started in August 2017 and progressed over the course of a few months.   He also endorses a cough which was mostly dry or occasionally productive of a small amount of yellowish phlegm as well as wheezing and a 20 lbs weight gain.  Cardiac workup to date has included a transthoracic echocardiogram from mid August which was normal as well as an exercise stress test which was stopped early due to dyspnea. The test was normal but noted a decrease in functional capacity of 20-30% (exercise time 7 minutes on Moises protocol).  CXR obtained in clinic was generally normal but PFTs were markedly abnormal prompting CT imaging.  CT chest was obtained and notable for a band like area of scarring in the RML as well as mild air trapping.  Following this, I suggested an empiric trial of flovent and albuterol as needed for asthma.      Mr. Conn returns to clinic today doing very well.  Overall, he feels that things are going better.  The cold air continues to bother him and he still gets winded when taking a flight of stairs quickly but he is significantly less short of breath than he was last time we met and his cough is mostly resolved.  He continues to have mild wheezing when coming in from the cold outdoors but not had any acute exacerbations requiring treatment with steroids or episodes of pneumonia.  He has fortunately remained generally healthy this cold and flu season.   He continues to use his albuterol on an as-needed basis which is mostly before going outside in the cold to shovel or walk his dog.  He otherwise has not needed his albuterol for rescue.  He denies cough, sputum production, fevers, chills, hemoptysis, chest pain, or other significant fluctuations in weight.  He is trying  to lose some weight and improve his eating.    He is a never smoker.      Review of Systems:  10 of 14 systems reviewed and are negative unless otherwise stated in HPI.    Past Medical History:   Diagnosis Date     Other acne        Past Surgical History:   Procedure Laterality Date     SURGICAL HISTORY OF -       Appendectomy     SURGICAL HISTORY OF -   age 2    Hernia - Right Inguinal     SURGICAL HISTORY OF -   age 3    Pectoral-Sternum Repair      SURGICAL HISTORY OF -   08/12/85    Tonsillectomy       Family History   Problem Relation Age of Onset     Hypertension Mother      Lipids Mother      DIABETES Mother      Thyroid Disease Father      Other Cancer Father 65     Gastric-spread to the bones, liver.     DIABETES Maternal Grandmother      Hypertension Maternal Grandfather      DIABETES Paternal Grandmother      Thyroid Disease Sister      Hyperthyroid.       Social History     Social History     Marital status:      Spouse name: N/A     Number of children: N/A     Years of education: N/A     Social History Main Topics     Smoking status: Never Smoker     Smokeless tobacco: Never Used     Alcohol use Yes      Comment: Sociallly- not very often.     Drug use: No     Sexual activity: Yes     Other Topics Concern     Parent/Sibling W/ Cabg, Mi Or Angioplasty Before 65f 55m? No     Social History Narrative         Allergies   Allergen Reactions     Ampicillin Rash     Codeine Rash     Erythromycin Rash     Keflex [Cephalexin Monohydrate] Rash     Penicillins Rash     Sulfa Drugs Rash         Current Outpatient Prescriptions:      beclomethasone (QVAR) 40 MCG/ACT Inhaler, Inhale 2 puffs into the lungs 2 times daily, Disp: 3 Inhaler, Rfl: 3     albuterol (PROAIR HFA/PROVENTIL HFA/VENTOLIN HFA) 108 (90 BASE) MCG/ACT Inhaler, Inhale 2 puffs into the lungs every 6 hours as needed for shortness of breath / dyspnea or wheezing, Disp: 3 Inhaler, Rfl: 1     allopurinol (ZYLOPRIM) 300 MG tablet, TAKE ONE TABLET BY  MOUTH EVERY DAY, Disp: 30 tablet, Rfl: 11     lisinopril (PRINIVIL/ZESTRIL) 5 MG tablet, Take 1 tablet (5 mg) by mouth daily, Disp: 90 tablet, Rfl: 3     metoprolol (TOPROL-XL) 50 MG 24 hr tablet, Take 1 tablet (50 mg) by mouth daily, Disp: 90 tablet, Rfl: 3     indomethacin (INDOCIN) 50 MG capsule, Take 1 capsule (50 mg) by mouth 3 times daily (with meals) (Patient not taking: Reported on 10/2/2017), Disp: 42 capsule, Rfl: 5     Omega-3 Fatty Acids (OMEGA-3 FISH OIL PO), , Disp: , Rfl:      ibuprofen (ADVIL,MOTRIN) 800 MG tablet, Take 1 tablet (800 mg) by mouth 3 times daily (with meals), Disp: 60 tablet, Rfl: 1     multivitamin, therapeutic with minerals (MULTI-VITAMIN) TABS, Take 1 tablet by mouth 2 times daily., Disp: , Rfl:       Physical Exam:  /81 (BP Location: Right arm, Patient Position: Chair, Cuff Size: Adult Large)  Pulse 70  Resp 16  SpO2 96%  GENERAL: Well developed, well nourished, alert, and in no apparent distress.  HEENT: Normocephalic, atraumatic. PERRL, EOMI. Oral mucosa is moist. No perioral cyanosis.  NECK: supple, no masses, no thyromegaly.  RESP:  Normal respiratory effort.  CTAB.  No rales, wheezes, rhonchi.  Normal to percussion.  No cyanosis or clubbing.  CV: Normal S1, S2, regular rhythm, normal rate. No murmur.  No LE edema.   ABDOMEN:  Soft, non-tender, non-distended.   SKIN: warm and dry. No rash.  NEURO: AAOx3.  Normal gait.  No focal neuro deficits.  PSYCH: mentation appears normal. and affect normal/bright    Results:  CT Chest 10/2:  1.  There is mild bandlike parenchymal abnormality in the right middle lobe which can relate to atelectasis and/or mild postinflammatory scarring. Correlate clinically for signs of active infection.  2. Mild scattered air trapping    PFTs: Flow volume loop is narrowed and coved.  Ratio 72% (predicted 79%), FVC 51%, FEV1 47%.  Study demonstrates a severe restrictive ventilatory defect based on previously reduced TLC from 8/2017 study.  When  compared to previous study FEV1 has declined slightly.      Assessment and Plan:   Mark Conn is a 42 year old male with a history of hypertension who presents to pulmonary clinic today for follow up of dyspnea on exertion. Based on CT findings of airtrapping and symptoms of shortness of breath, wheezing and cough, the decision was made to treat empirically for asthma with low dose Qvar and albuterol.  Mr. Conn returns to clinic today subjectively improved which is reassuring.  Based on this, I think it would be reasonable to continue him on his current regimen.  His restrictive PFTs are likely due to scarring of the RML as seen on his CT imaging +/- thoracic cage deformity related to pectus repair in childhood.  There is no evidence of ILD on chest imaging which also reassuring.  RML scarring may be related to sequelae of prior infection vs atelectasis but at this point is of little consequence and does not require further investiation.   We again discussed that his shortness of breath could be related to or exacerbated by deconditioning and a 20 pound weight gain over the course of the last year (BMI 38). I again encouraged him to participate in regular aerobic exercise and attempt to lose some weight.  Questions and concerns were answered to the patient's satisfaction.  He was provided with my contact information should new questions or concerns arise in the interim.  He will follow up with us in 6 months.  He is up to date on a seasonal influenza vaccine.  Haily Orellana MD  Pulmonary and Critical Care Medicine

## 2018-01-18 NOTE — NURSING NOTE
Chief Complaint   Patient presents with     Breathing Problem     Patient is being seen for follow up of breathing issues      Deysi Krause CMA at 2:18 PM on 1/18/2018

## 2018-01-24 LAB
EXPTIME-PRE: 8.35 SEC
FEF2575-%PRED-PRE: 34 %
FEF2575-PRE: 1.47 L/SEC
FEF2575-PRED: 4.32 L/SEC
FEFMAX-%PRED-PRE: 65 %
FEFMAX-PRE: 7.26 L/SEC
FEFMAX-PRED: 11.01 L/SEC
FEV1-%PRED-PRE: 47 %
FEV1-PRE: 2.19 L
FEV1FEV6-PRE: 73 %
FEV1FEV6-PRED: 81 %
FEV1FVC-PRE: 72 %
FEV1FVC-PRED: 79 %
FIFMAX-PRE: 4.53 L/SEC
FVC-%PRED-PRE: 51 %
FVC-PRE: 3.03 L
FVC-PRED: 5.91 L

## 2018-01-26 ENCOUNTER — TELEPHONE (OUTPATIENT)
Dept: FAMILY MEDICINE | Facility: CLINIC | Age: 43
End: 2018-01-26

## 2018-04-19 DIAGNOSIS — I10 BENIGN ESSENTIAL HYPERTENSION: ICD-10-CM

## 2018-04-20 NOTE — TELEPHONE ENCOUNTER
Left message for patient to return call to clinic.  Patient due for OV and labs, > 1 year since both    Georgina BRANCH Rn

## 2018-04-20 NOTE — TELEPHONE ENCOUNTER
"Requested Prescriptions   Pending Prescriptions Disp Refills     lisinopril (PRINIVIL/ZESTRIL) 5 MG tablet [Pharmacy Med Name: LISINOPRIL 5MG TABS]  Last Written Prescription Date:  04/17/2017  Last Fill Quantity: 90,  # refills: 3   Last office visit: 08/07/2017 with prescribing provider:  amos   Future Office Visit:     90 tablet 3     Sig: TAKE ONE TABLET BY MOUTH EVERY DAY    ACE Inhibitors (Including Combos) Protocol Failed    4/20/2018  7:33 AM       Failed - Blood pressure under 140/90 in past 12 months    BP Readings from Last 3 Encounters:   01/18/18 141/81   10/02/17 135/68   08/07/17 133/75                Passed - Recent (12 mo) or future (30 days) visit within the authorizing provider's specialty    Patient had office visit in the last 12 months or has a visit in the next 30 days with authorizing provider or within the authorizing provider's specialty.  See \"Patient Info\" tab in inbasket, or \"Choose Columns\" in Meds & Orders section of the refill encounter.           Passed - Patient is age 18 or older       Passed - Normal serum creatinine on file in past 12 months    Recent Labs   Lab Test  04/22/17   0706   CR  1.03            Passed - Normal serum potassium on file in past 12 months    Recent Labs   Lab Test  04/22/17   0706   POTASSIUM  4.1             Yoan Angel RT (R)    "

## 2018-04-23 NOTE — TELEPHONE ENCOUNTER
Patient returned our call and I made an appt with Dr. Herrera Thurs April 26th at 6:40 am, he has enough medication until Friday.  Deyanira Kidd  [facility] [place] [facility]

## 2018-04-25 RX ORDER — LISINOPRIL 5 MG/1
TABLET ORAL
Qty: 90 TABLET | Refills: 3 | OUTPATIENT
Start: 2018-04-25

## 2018-04-26 ENCOUNTER — OFFICE VISIT (OUTPATIENT)
Dept: FAMILY MEDICINE | Facility: CLINIC | Age: 43
End: 2018-04-26
Payer: COMMERCIAL

## 2018-04-26 VITALS
SYSTOLIC BLOOD PRESSURE: 137 MMHG | WEIGHT: 310 LBS | BODY MASS INDEX: 38.54 KG/M2 | TEMPERATURE: 97.5 F | DIASTOLIC BLOOD PRESSURE: 89 MMHG | HEART RATE: 77 BPM | HEIGHT: 75 IN

## 2018-04-26 DIAGNOSIS — I10 BENIGN ESSENTIAL HYPERTENSION: ICD-10-CM

## 2018-04-26 DIAGNOSIS — E16.2 HYPOGLYCEMIA: ICD-10-CM

## 2018-04-26 DIAGNOSIS — J45.40 MODERATE PERSISTENT ASTHMA, UNSPECIFIED WHETHER COMPLICATED: Primary | ICD-10-CM

## 2018-04-26 PROCEDURE — 99214 OFFICE O/P EST MOD 30 MIN: CPT | Performed by: FAMILY MEDICINE

## 2018-04-26 RX ORDER — METOPROLOL SUCCINATE 50 MG/1
50 TABLET, EXTENDED RELEASE ORAL DAILY
Qty: 90 TABLET | Refills: 3 | Status: SHIPPED | OUTPATIENT
Start: 2018-04-26 | End: 2019-01-25

## 2018-04-26 RX ORDER — LISINOPRIL 10 MG/1
10 TABLET ORAL DAILY
Qty: 30 TABLET | Refills: 11 | Status: SHIPPED | OUTPATIENT
Start: 2018-04-26 | End: 2019-01-25

## 2018-04-26 RX ORDER — MONTELUKAST SODIUM 10 MG/1
10 TABLET ORAL AT BEDTIME
Qty: 30 TABLET | Refills: 11 | Status: SHIPPED | OUTPATIENT
Start: 2018-04-26 | End: 2019-01-25

## 2018-04-26 NOTE — PROGRESS NOTES
SUBJECTIVE:   Mark Conn is a 42 year old male who presents to clinic today for the following health issues:      Hypertension Follow-up      Outpatient blood pressures are being checked at home.  Results are 135-140/80-90.    Low Salt Diet: no added salt      Amount of exercise or physical activity: States he could do more.    Problems taking medications regularly: No    Medication side effects: none    Diet: No added salt.      BLOOD SUGARS:  He has noticed that his blood sugars can be lower after eating.  His wife has a glucometer and he has been checking off and on.  One time he felt shaky after eating and the level was 64.  Then he was at [employer] and they had a health screen and it was 74 after eating two hours prior.      Today the ACT is 17.       Current Outpatient Prescriptions:      albuterol (PROAIR HFA/PROVENTIL HFA/VENTOLIN HFA) 108 (90 BASE) MCG/ACT Inhaler, Inhale 2 puffs into the lungs every 6 hours as needed for shortness of breath / dyspnea or wheezing, Disp: 3 Inhaler, Rfl: 1     allopurinol (ZYLOPRIM) 300 MG tablet, TAKE ONE TABLET BY MOUTH EVERY DAY, Disp: 30 tablet, Rfl: 11     beclomethasone (QVAR) 40 MCG/ACT Inhaler, Inhale 2 puffs into the lungs 2 times daily, Disp: 3 Inhaler, Rfl: 3     ibuprofen (ADVIL,MOTRIN) 800 MG tablet, Take 1 tablet (800 mg) by mouth 3 times daily (with meals), Disp: 60 tablet, Rfl: 1     lisinopril (PRINIVIL/ZESTRIL) 5 MG tablet, Take 1 tablet (5 mg) by mouth daily, Disp: 90 tablet, Rfl: 3     metoprolol (TOPROL-XL) 50 MG 24 hr tablet, Take 1 tablet (50 mg) by mouth daily, Disp: 90 tablet, Rfl: 3     multivitamin, therapeutic with minerals (MULTI-VITAMIN) TABS, Take 1 tablet by mouth 2 times daily., Disp: , Rfl:      Omega-3 Fatty Acids (OMEGA-3 FISH OIL PO), , Disp: , Rfl:      indomethacin (INDOCIN) 50 MG capsule, Take 1 capsule (50 mg) by mouth 3 times daily (with meals) (Patient not taking: Reported on 10/2/2017), Disp: 42 capsule, Rfl: 5    Patient  "Active Problem List   Diagnosis     Pain in limb     Colitis     Hypertriglyceridemia     Malabsorption of glucose     CARDIOVASCULAR SCREENING; LDL GOAL LESS THAN 130     Benign essential hypertension     Obesity     Chronic gout without tophus, unspecified cause, unspecified site     Restrictive lung disease       Blood pressure 137/89, pulse 77, temperature 97.5  F (36.4  C), temperature source Tympanic, height 6' 3\" (1.905 m), weight 310 lb (140.6 kg).    Exam:  GENERAL APPEARANCE: healthy, alert and no distress  EYES: EOMI,  PERRL  NECK: no adenopathy, no asymmetry, masses, or scars and thyroid normal to palpation  RESP: lungs clear to auscultation - no rales, rhonchi or wheezes  CV: regular rates and rhythm, normal S1 S2, no S3 or S4 and no murmur, click or rub -  PSYCH: mentation appears normal and affect normal/bright      (J45.40) Moderate persistent asthma, unspecified whether complicated  (primary encounter diagnosis)  Comment:   Plan: Asthma Action Plan (AAP), montelukast         (SINGULAIR) 10 MG tablet        For the breathing and the asthma we reviewed and discussed the medications and stay on the two inhalers, and identify triggers to avoid.   Add Singulair at 10 mg daily to the inhalers. If not better quickly in 2-3 weeks then the referral has been done to our Asthma providers at the [facility] and call for this appt.   If you are better then call our RN at [phone] and do a phone ACT.     (I10) Benign essential hypertension  Comment:   Plan: lisinopril (PRINIVIL/ZESTRIL) 10 MG tablet,         metoprolol succinate (TOPROL-XL) 50 MG 24 hr         tablet        For the BP the goal for the average is under 130/80 and you are 5-10 points high. Use the non drug therapies. Increase the Lisinopril to 10 mg daily now.   If the BP is not at goal in 2-3 weeks increase again to 20 mg daily. Call our clinic a few weeks later with the readings, if not normal.     (E16.2) Hypoglycemia  Comment:   Plan: For the " sugars, use the more complex carbs, such as potato and cereals and rice and  Bread. Avoid sweets and candy and pop. Use diet products.   If this is not adequate then call our RN and we will order the 3 hour Glucose Tolerance Test ( GTT).       Henry Herrera

## 2018-04-26 NOTE — MR AVS SNAPSHOT
After Visit Summary   4/26/2018    Mark Conn    MRN: [id number]           Patient Information     Date Of Birth          1975        Visit Information        Provider Department      4/26/2018 6:40 AM Henry Herrera MD [facility]        Today's Diagnoses     Moderate persistent asthma, unspecified whether complicated    -  1    Benign essential hypertension        Hypoglycemia          Care Instructions          Thank you for choosing [facility].  You may be receiving a survey in the mail from [facility] regarding your visit today.  Please take a few minutes to complete and return the survey to let us know how we are doing.      If you have questions or concerns, please contact us via [employer] or you can contact your care team at [phone].    Our Clinic hours are:  Monday 6:40 am  to 7:00 pm  Tuesday -Friday 6:40 am to 5:00 pm    The [place] outpatient lab hours are:  Monday - Friday 6:10 am to 4:45 pm  Saturdays 7:00 am to 11:00 am  Appointments are required, call [phone]    If you have clinical questions after hours or would like to schedule an appointment,  call the clinic at [phone].      (J45.40) Moderate persistent asthma, unspecified whether complicated  (primary encounter diagnosis)  Comment:   Plan: Asthma Action Plan (AAP), montelukast         (SINGULAIR) 10 MG tablet        For the breathing and the asthma we reviewed and discussed the medications and stay on the two inhalers, and identify triggers to avoid.   Add Singulair at 10 mg daily to the inhalers. If not better quickly in 2-3 weeks then the referral has been done to our Asthma providers at the [facility] and call for this appt.   If you are better then call our RN at [phone] and do a phone ACT.     (I10) Benign essential hypertension  Comment:   Plan: lisinopril (PRINIVIL/ZESTRIL) 10 MG tablet,         metoprolol succinate (TOPROL-XL) 50 MG 24 hr         tablet        For the BP  the goal for the average is under 130/80 and you are 5-10 points high. Use the non drug therapies. Increase the Lisinopril to 10 mg daily now.   If the BP is not at goal in 2-3 weeks increase again to 20 mg daily. Call our clinic a few weeks later with the readings, if not normal.     (E16.2) Hypoglycemia  Comment:   Plan: For the sugars, use the more complex carbs, such as potato and cereals and rice and  Bread. Avoid sweets and candy and pop. Use diet products.   If this is not adequate then call our RN and we will order the 3 hour Glucose Tolerance Test ( GTT).           Follow-ups after your visit        Who to contact     If you have questions or need follow up information about today's clinic visit or your schedule please contact [facility] directly at [phone].  Normal or non-critical lab and imaging results will be communicated to you by [employer], letter or phone within 4 business days after the clinic has received the results. If you do not hear from us within 7 days, please contact the clinic through [employer] or phone. If you have a critical or abnormal lab result, we will notify you by phone as soon as possible.  Submit refill requests through [employer] or call your pharmacy and they will forward the refill request to us. Please allow 3 business days for your refill to be completed.          Additional Information About Your Visit        [employer] Information     [employer] gives you secure access to your electronic health record. If you see a primary care provider, you can also send messages to your care team and make appointments. If you have questions, please call your primary care clinic.  If you do not have a primary care provider, please call [phone] and they will assist you.        Care EveryWhere ID     This is your Care EveryWhere ID. This could be used by other organizations to access your [place] medical records  [id number]        Your Vitals Were     Pulse Temperature Height  "BMI (Body Mass Index)          77 97.5  F (36.4  C) (Tympanic) 6' 3\" (1.905 m) 38.75 kg/m2         Blood Pressure from Last 3 Encounters:   04/26/18 137/89   01/18/18 141/81   10/02/17 135/68    Weight from Last 3 Encounters:   04/26/18 310 lb (140.6 kg)   10/02/17 300 lb (136.1 kg)   08/07/17 298 lb (135.2 kg)              We Performed the Following     Asthma Action Plan (AAP)          Today's Medication Changes          These changes are accurate as of 4/26/18  7:25 AM.  If you have any questions, ask your nurse or doctor.               Start taking these medicines.        Dose/Directions    montelukast 10 MG tablet   Commonly known as:  SINGULAIR   Used for:  Moderate persistent asthma, unspecified whether complicated   Started by:  Henry Herrera MD        Dose:  10 mg   Take 1 tablet (10 mg) by mouth At Bedtime   Quantity:  30 tablet   Refills:  11         These medicines have changed or have updated prescriptions.        Dose/Directions    lisinopril 10 MG tablet   Commonly known as:  PRINIVIL/ZESTRIL   This may have changed:    - medication strength  - how much to take   Used for:  Benign essential hypertension   Changed by:  Henry Herrera MD        Dose:  10 mg   Take 1 tablet (10 mg) by mouth daily   Quantity:  30 tablet   Refills:  11            Where to get your medicines      These medications were sent to [facility] [address] [address]     Phone:  [phone]     lisinopril 10 MG tablet    metoprolol succinate 50 MG 24 hr tablet    montelukast 10 MG tablet                Primary Care Provider Office Phone # Fax #    Henry Herrera [phone] [address] [facility] [address]        Equal Access to Services     SILVIA CASAREZ : Khari andino Sojuvenal, waaxda luqadaha, qaybta kaalmada natalie, nhung wesley. So [facility] [phone].    ATENCIÓN: " Si habla verenice, tiene a sweeney disposición servicios gratuitos de asistencia lingüística. Woo mayfield [phone].    We comply with applicable federal civil rights laws and [place] laws. We do not discriminate on the basis of race, color, national origin, age, disability, sex, sexual orientation, or gender identity.            Thank you!     Thank you for choosing [facility]  for your care. Our goal is always to provide you with excellent care. Hearing back from our patients is one way we can continue to improve our services. Please take a few minutes to complete the written survey that you may receive in the mail after your visit with us. Thank you!             Your Updated Medication List - Protect others around you: Learn how to safely use, store and throw away your medicines at www.disposemymeds.org.          This list is accurate as of 4/26/18  7:25 AM.  Always use your most recent med list.                   Brand Name Dispense Instructions for use Diagnosis    albuterol 108 (90 Base) MCG/ACT Inhaler    PROAIR HFA/PROVENTIL HFA/VENTOLIN HFA    3 Inhaler    Inhale 2 puffs into the lungs every 6 hours as needed for shortness of breath / dyspnea or wheezing    Mild intermittent asthma without complication       allopurinol 300 MG tablet    ZYLOPRIM    30 tablet    TAKE ONE TABLET BY MOUTH EVERY DAY    Chronic gout without tophus, unspecified cause, unspecified site       beclomethasone 40 MCG/ACT Inhaler    QVAR    3 Inhaler    Inhale 2 puffs into the lungs 2 times daily    Mild persistent asthma without complication       ibuprofen 800 MG tablet    ADVIL/MOTRIN    60 tablet    Take 1 tablet (800 mg) by mouth 3 times daily (with meals)    Cervical radiculopathy       indomethacin 50 MG capsule    INDOCIN    42 capsule    Take 1 capsule (50 mg) by mouth 3 times daily (with meals)    Gout       lisinopril 10 MG tablet    PRINIVIL/ZESTRIL    30 tablet    Take 1 tablet (10 mg) by mouth daily    Benign  essential hypertension       metoprolol succinate 50 MG 24 hr tablet    TOPROL-XL    90 tablet    Take 1 tablet (50 mg) by mouth daily    Benign essential hypertension       montelukast 10 MG tablet    SINGULAIR    30 tablet    Take 1 tablet (10 mg) by mouth At Bedtime    Moderate persistent asthma, unspecified whether complicated       Multi-vitamin Tabs tablet      Take 1 tablet by mouth 2 times daily.        OMEGA-3 FISH OIL PO

## 2018-04-26 NOTE — PATIENT INSTRUCTIONS
Thank you for choosing [facility].  You may be receiving a survey in the mail from [facility] regarding your visit today.  Please take a few minutes to complete and return the survey to let us know how we are doing.      If you have questions or concerns, please contact us via [employer] or you can contact your care team at [phone].    Our Clinic hours are:  Monday 6:40 am  to 7:00 pm  Tuesday -Friday 6:40 am to 5:00 pm    The [place] outpatient lab hours are:  Monday - Friday 6:10 am to 4:45 pm  Saturdays 7:00 am to 11:00 am  Appointments are required, call [phone]    If you have clinical questions after hours or would like to schedule an appointment,  call the clinic at [phone].      (J45.40) Moderate persistent asthma, unspecified whether complicated  (primary encounter diagnosis)  Comment:   Plan: Asthma Action Plan (AAP), montelukast         (SINGULAIR) 10 MG tablet        For the breathing and the asthma we reviewed and discussed the medications and stay on the two inhalers, and identify triggers to avoid.   Add Singulair at 10 mg daily to the inhalers. If not better quickly in 2-3 weeks then the referral has been done to our Asthma providers at the [facility] and call for this appt.   If you are better then call our RN at [phone] and do a phone ACT.     (I10) Benign essential hypertension  Comment:   Plan: lisinopril (PRINIVIL/ZESTRIL) 10 MG tablet,         metoprolol succinate (TOPROL-XL) 50 MG 24 hr         tablet        For the BP the goal for the average is under 130/80 and you are 5-10 points high. Use the non drug therapies. Increase the Lisinopril to 10 mg daily now.   If the BP is not at goal in 2-3 weeks increase again to 20 mg daily. Call our clinic a few weeks later with the readings, if not normal.     (E16.2) Hypoglycemia  Comment:   Plan: For the sugars, use the more complex carbs, such as potato and cereals and rice and  Bread. Avoid sweets and candy and pop. Use diet  products.   If this is not adequate then call our RN and we will order the 3 hour Glucose Tolerance Test ( GTT).

## 2018-04-26 NOTE — NURSING NOTE
"Chief Complaint   Patient presents with     Hypertension     Recheck on blood pressure.       Initial /89  Pulse 77  Temp 97.5  F (36.4  C) (Tympanic)  Ht 6' 3\" (1.905 m)  Wt 310 lb (140.6 kg)  BMI 38.75 kg/m2 Estimated body mass index is 38.75 kg/(m^2) as calculated from the following:    Height as of this encounter: 6' 3\" (1.905 m).    Weight as of this encounter: 310 lb (140.6 kg).  Medication Reconciliation: complete  "

## 2018-04-26 NOTE — LETTER
My Asthma Action Plan  Name: Mark Conn   YOB: 1975  Date: 4/26/2018   My doctor: Henry Herrera MD   My clinic: [facility]        My Control Medicine: Beclomethasone (QVar) -  40 mcg 2 puffs twice daily.  My Rescue Medicine: Albuterol (Proair/Ventolin/Proventil) inhaler As needed.   My Asthma Severity: intermittent  Avoid your asthma triggers: A list of triggers is enclosed with your letter.               GREEN ZONE   Good Control    I feel good    No cough or wheeze    Can work, sleep and play without asthma symptoms       Take your asthma control medicine every day.     1. If exercise triggers your asthma, take your rescue medication    15 minutes before exercise or sports, and    During exercise if you have asthma symptoms  2. Spacer to use with inhaler: If you have a spacer, make sure to use it with your inhaler             YELLOW ZONE Getting Worse  I have ANY of these:    I do not feel good    Cough or wheeze    Chest feels tight    Wake up at night   1. Keep taking your Green Zone medications  2. Start taking your rescue medicine:    every 20 minutes for up to 1 hour. Then every 4 hours for 24-48 hours.  3. If you stay in the Yellow Zone for more than 12-24 hours, contact your doctor.  4. If you do not return to the Green Zone in 12-24 hours or you get worse, start taking your oral steroid medicine if prescribed by your provider.           RED ZONE Medical Alert - Get Help  I have ANY of these:    I feel awful    Medicine is not helping    Breathing getting harder    Trouble walking or talking    Nose opens wide to breathe       1. Take your rescue medicine NOW  2. If your provider has prescribed an oral steroid medicine, start taking it NOW  3. Call your doctor NOW  4. If you are still in the Red Zone after 20 minutes and you have not reached your doctor:    Take your rescue medicine again and    Call 911 or go to the emergency room right away    See your regular  doctor within 2 weeks of an Emergency Room or Urgent Care visit for follow-up treatment.          Annual Reminders:  Meet with Asthma Educator,  Flu Shot in the Fall, consider Pneumonia Vaccination for patients with asthma (aged 19 and older).    Pharmacy:    [id number] - [place], [place] - [address] AT [address], [place] [address] [facility], [place] - [address] [place] [address]                      Asthma Triggers  How To Control Things That Make Your Asthma Worse    Triggers are things that make your asthma worse.  Look at the list below to help you find your triggers and what you can do about them.  You can help prevent asthma flare-ups by staying away from your triggers.      Trigger                                                          What you can do   Cigarette Smoke  Tobacco smoke can make asthma worse. Do not allow smoking in your home, car or around you.  Be sure no one smokes at a child s day care or school.  If you smoke, ask your health care provider for ways to help you quit.  Ask family members to quit too.  Ask your health care provider for a referral to Quit Plan to help you quit smoking, or call 1-269-352-PLAN.     Colds, Flu, Bronchitis  These are common triggers of asthma. Wash your hands often.  Don t touch your eyes, nose or mouth.  Get a flu shot every year.     Dust Mites  These are tiny bugs that live in cloth or carpet. They are too small to see. Wash sheets and blankets in hot water every week.   Encase pillows and mattress in dust mite proof covers.  Avoid having carpet if you can. If you have carpet, vacuum weekly.   Use a dust mask and HEPA vacuum.   Pollen and Outdoor Mold  Some people are allergic to trees, grass, or weed pollen, or molds. Try to keep your windows closed.  Limit time out doors when pollen count is high.   Ask you health care provider about taking medicine during  allergy season.     Animal Dander  Some people are allergic to skin flakes, urine or saliva from pets with fur or feathers. Keep pets with fur or feathers out of your home.    If you can t keep the pet outdoors, then keep the pet out of your bedroom.  Keep the bedroom door closed.  Keep pets off cloth furniture and away from stuffed toys.     Mice, Rats, and Cockroaches  Some people are allergic to the waste from these pests.   Cover food and garbage.  Clean up spills and food crumbs.  Store grease in the refrigerator.   Keep food out of the bedroom.   Indoor Mold  This can be a trigger if your home has high moisture. Fix leaking faucets, pipes, or other sources of water.   Clean moldy surfaces.  Dehumidify basement if it is damp and smelly.   Smoke, Strong Odors, and Sprays  These can reduce air quality. Stay away from strong odors and sprays, such as perfume, powder, hair spray, paints, smoke incense, paint, cleaning products, candles and new carpet.   Exercise or Sports  Some people with asthma have this trigger. Be active!  Ask your doctor about taking medicine before sports or exercise to prevent symptoms.    Warm up for 5-10 minutes before and after sports or exercise.     Other Triggers of Asthma  Cold air:  Cover your nose and mouth with a scarf.  Sometimes laughing or crying can be a trigger.  Some medicines and food can trigger asthma.

## 2018-04-27 ASSESSMENT — ASTHMA QUESTIONNAIRES: ACT_TOTALSCORE: 17

## 2018-07-03 ENCOUNTER — TELEPHONE (OUTPATIENT)
Dept: FAMILY MEDICINE | Facility: CLINIC | Age: 43
End: 2018-07-03

## 2018-07-03 NOTE — LETTER
July 3, 2018      Mark Conn  [address] [address] [place] [id number]        Dear Mark,     Your [facility] Team works hard to make sure that you and your family receive exceptional care. Enclosed you will find a copy of the Asthma Control Test (ACT) that our clinic uses to monitor and manage your asthma. This test is an assessment tool that we use to determine how well your asthma is controlled.  Please keep a copy of the ACT for your reference when we call you to complete this assessment.     We will call within the next 2 weeks to review the questionnaire.    Thank you for trusting us with your health care.    Sincerely,        Your [facility] Team/candi

## 2018-07-03 NOTE — TELEPHONE ENCOUNTER
Patient is due to update ACT, last done 4/26/18 with a score of 17.  Moderate persistent asthma.    Per 4/26/18 office visit:    (J45.40) Moderate persistent asthma, unspecified whether complicated  (primary encounter diagnosis)  Comment:   Plan: Asthma Action Plan (AAP), montelukast         (SINGULAIR) 10 MG tablet        For the breathing and the asthma we reviewed and discussed the medications and stay on the two inhalers, and identify triggers to avoid.   Add Singulair at 10 mg daily to the inhalers. If not better quickly in 2-3 weeks then the referral has been done to our Asthma providers at the [facility] and call for this appt.   If you are better then call our RN at [phone] and do a phone ACT.     Letter with ACT mailed to patient.  Will call to review.

## 2018-07-10 NOTE — TELEPHONE ENCOUNTER
Panel Management Review      Patient has the following on his problem list:     Asthma review     ACT Total Scores 7/10/2018   ACT TOTAL SCORE (Goal Greater than or Equal to 20) 20   In the past 12 months, how many times did you visit the emergency room for your asthma without being admitted to the hospital? 0   In the past 12 months, how many times were you hospitalized overnight because of your asthma? 0      1. Is Asthma diagnosis on the Problem List? Yes    2. Is Asthma listed on Health Maintenance? Yes    3. Patient is due for:  ACT      Composite cancer screening  Chart review shows that this patient is due/due soon for the following None  Summary:    Patient is due/failing the following:   ACT    Action needed:   Patient needs to do ACT.    Type of outreach:    ACT completed with a score of 20.  Patient is taking the singulair, no questions at this time.    Questions for provider review:    None                                                                                                                                    JONATHAN Frost MA

## 2018-07-11 ASSESSMENT — ASTHMA QUESTIONNAIRES: ACT_TOTALSCORE: 20

## 2018-08-31 DIAGNOSIS — M1A.9XX0 CHRONIC GOUT WITHOUT TOPHUS, UNSPECIFIED CAUSE, UNSPECIFIED SITE: ICD-10-CM

## 2018-09-04 RX ORDER — ALLOPURINOL 300 MG/1
TABLET ORAL
Qty: 30 TABLET | Refills: 11 | Status: SHIPPED | OUTPATIENT
Start: 2018-09-04 | End: 2019-09-02

## 2018-09-04 NOTE — TELEPHONE ENCOUNTER
Allopurinol  Last Written Prescription Date:  9/7/17  Last Fill Quantity: 30,  # refills: 11   Last office visit: 4/26/2018 with prescribing provider:     Future Office Visit:

## 2018-09-04 NOTE — TELEPHONE ENCOUNTER
Routing refill request to provider for review/approval because:  Labs not current:  Uric acid , alt, cr and cbc    Thank you  No GRANGER RN

## 2018-11-19 ENCOUNTER — ALLIED HEALTH/NURSE VISIT (OUTPATIENT)
Dept: FAMILY MEDICINE | Facility: CLINIC | Age: 43
End: 2018-11-19
Payer: COMMERCIAL

## 2018-11-19 DIAGNOSIS — Z23 NEED FOR PROPHYLACTIC VACCINATION AND INOCULATION AGAINST INFLUENZA: Primary | ICD-10-CM

## 2018-11-19 PROCEDURE — 90471 IMMUNIZATION ADMIN: CPT

## 2018-11-19 PROCEDURE — 90686 IIV4 VACC NO PRSV 0.5 ML IM: CPT

## 2018-11-19 PROCEDURE — 99207 ZZC NO CHARGE NURSE ONLY: CPT

## 2018-11-19 NOTE — MR AVS SNAPSHOT
After Visit Summary   11/19/2018    Mark Conn    MRN: [id number]           Patient Information     Date Of Birth          1975        Visit Information        Provider Department      11/19/2018 4:00 PM [place] NB NILE/LPN [facility]        Today's Diagnoses     Need for prophylactic vaccination and inoculation against influenza    -  1       Follow-ups after your visit        Who to contact     If you have questions or need follow up information about today's clinic visit or your schedule please contact [facility] directly at [phone].  Normal or non-critical lab and imaging results will be communicated to you by [employer], letter or phone within 4 business days after the clinic has received the results. If you do not hear from us within 7 days, please contact the clinic through [employer] or phone. If you have a critical or abnormal lab result, we will notify you by phone as soon as possible.  Submit refill requests through [employer] or call your pharmacy and they will forward the refill request to us. Please allow 3 business days for your refill to be completed.          Additional Information About Your Visit        MyChart Information     [employer] gives you secure access to your electronic health record. If you see a primary care provider, you can also send messages to your care team and make appointments. If you have questions, please call your primary care clinic.  If you do not have a primary care provider, please call [phone] and they will assist you.        Care EveryWhere ID     This is your Care EveryWhere ID. This could be used by other organizations to access your [facility] medical records  [id number]         Blood Pressure from Last 3 Encounters:   04/26/18 137/89   01/18/18 141/81   10/02/17 135/68    Weight from Last 3 Encounters:   04/26/18 310 lb (140.6 kg)   10/02/17 300 lb (136.1 kg)   08/07/17 298 lb (135.2 kg)              We Performed  the Following     FLU VACCINE, SPLIT VIRUS, IM (QUADRIVALENT) [73648]- >3 YRS     Vaccine Administration, Initial [42462]        Primary Care Provider Office Phone # Fax #    Henry Herrera [phone] [address] [facility] [address]        Equal Access to Services     SILVIA CASAREZ : Hadii aad ku hadasho Soomaali, waaxda luqadaha, qaybta kaalmada adeegyada, nhung beaulieuin hayaan adeskyler chatmanadenikereyna bae . So [facility] [phone].    ATENCIÓN: Si habla español, tiene a sweeney disposición servicios gratuitos de asistencia lingüística. Woo al [phone].    We comply with applicable federal civil rights laws and [place] laws. We do not discriminate on the basis of race, color, national origin, age, disability, sex, sexual orientation, or gender identity.            Thank you!     Thank you for choosing [facility]  for your care. Our goal is always to provide you with excellent care. Hearing back from our patients is one way we can continue to improve our services. Please take a few minutes to complete the written survey that you may receive in the mail after your visit with us. Thank you!             Your Updated Medication List - Protect others around you: Learn how to safely use, store and throw away your medicines at www.disposemymeds.org.          This list is accurate as of 11/19/18  4:14 PM.  Always use your most recent med list.                   Brand Name Dispense Instructions for use Diagnosis    albuterol 108 (90 Base) MCG/ACT inhaler    PROAIR HFA/PROVENTIL HFA/VENTOLIN HFA    3 Inhaler    Inhale 2 puffs into the lungs every 6 hours as needed for shortness of breath / dyspnea or wheezing    Mild intermittent asthma without complication       allopurinol 300 MG tablet    ZYLOPRIM    30 tablet    TAKE ONE TABLET BY MOUTH EVERY DAY    Chronic gout without tophus, unspecified cause, unspecified site       beclomethasone 40 MCG/ACT Aers IS A DISCONTINUED MEDICATION     QVAR    3 Inhaler    Inhale 2 puffs into the lungs 2 times daily    Mild persistent asthma without complication       ibuprofen 800 MG tablet    ADVIL/MOTRIN    60 tablet    Take 1 tablet (800 mg) by mouth 3 times daily (with meals)    Cervical radiculopathy       indomethacin 50 MG capsule    INDOCIN    42 capsule    Take 1 capsule (50 mg) by mouth 3 times daily (with meals)    Gout       lisinopril 10 MG tablet    PRINIVIL/ZESTRIL    30 tablet    Take 1 tablet (10 mg) by mouth daily    Benign essential hypertension       metoprolol succinate 50 MG 24 hr tablet    TOPROL-XL    90 tablet    Take 1 tablet (50 mg) by mouth daily    Benign essential hypertension       montelukast 10 MG tablet    SINGULAIR    30 tablet    Take 1 tablet (10 mg) by mouth At Bedtime    Moderate persistent asthma, unspecified whether complicated       Multi-vitamin Tabs tablet      Take 1 tablet by mouth 2 times daily.        OMEGA-3 FISH OIL PO

## 2018-11-19 NOTE — PROGRESS NOTES
Injectable Influenza Immunization Documentation    1.  Is the person to be vaccinated sick today?   No    2. Does the person to be vaccinated have an allergy to a component   of the vaccine?   No  Egg Allergy Algorithm Link    3. Has the person to be vaccinated ever had a serious reaction   to influenza vaccine in the past?   No    4. Has the person to be vaccinated ever had Guillain-Barré syndrome?   No    Form completed by Ashley Pratt CMA    .Prior to injection verified patient identity using patient's name and date of birth.  Due to injection administration, patient instructed to remain in clinic for 15 minutes  afterwards, and to report any adverse reaction to me immediately.    Ashley Pratt CMA

## 2019-01-23 ENCOUNTER — TELEPHONE (OUTPATIENT)
Dept: FAMILY MEDICINE | Facility: CLINIC | Age: 44
End: 2019-01-23

## 2019-01-23 NOTE — TELEPHONE ENCOUNTER
[facility]  Orders were signed, faxed and sent to melinda Sanchez on 1/23/2019 at 10:08 AM

## 2019-01-25 ENCOUNTER — OFFICE VISIT (OUTPATIENT)
Dept: FAMILY MEDICINE | Facility: CLINIC | Age: 44
End: 2019-01-25
Payer: COMMERCIAL

## 2019-01-25 VITALS
OXYGEN SATURATION: 96 % | SYSTOLIC BLOOD PRESSURE: 138 MMHG | WEIGHT: 308.6 LBS | DIASTOLIC BLOOD PRESSURE: 84 MMHG | HEIGHT: 75 IN | BODY MASS INDEX: 38.37 KG/M2 | TEMPERATURE: 97.1 F | RESPIRATION RATE: 18 BRPM | HEART RATE: 86 BPM

## 2019-01-25 DIAGNOSIS — J45.20 MILD INTERMITTENT ASTHMA WITHOUT COMPLICATION: ICD-10-CM

## 2019-01-25 DIAGNOSIS — J45.40 MODERATE PERSISTENT ASTHMA, UNSPECIFIED WHETHER COMPLICATED: ICD-10-CM

## 2019-01-25 DIAGNOSIS — I10 BENIGN ESSENTIAL HYPERTENSION: ICD-10-CM

## 2019-01-25 DIAGNOSIS — I47.10 SVT (SUPRAVENTRICULAR TACHYCARDIA) (H): Primary | ICD-10-CM

## 2019-01-25 PROCEDURE — 99214 OFFICE O/P EST MOD 30 MIN: CPT | Performed by: FAMILY MEDICINE

## 2019-01-25 RX ORDER — MONTELUKAST SODIUM 10 MG/1
10 TABLET ORAL AT BEDTIME
Qty: 90 TABLET | Refills: 3 | Status: SHIPPED | OUTPATIENT
Start: 2019-01-25 | End: 2020-02-13

## 2019-01-25 RX ORDER — ALBUTEROL SULFATE 90 UG/1
2 AEROSOL, METERED RESPIRATORY (INHALATION) EVERY 6 HOURS PRN
Qty: 1 INHALER | Refills: 11 | Status: SHIPPED | OUTPATIENT
Start: 2019-01-25 | End: 2020-02-13

## 2019-01-25 RX ORDER — LISINOPRIL 10 MG/1
10 TABLET ORAL DAILY
Qty: 90 TABLET | Refills: 3 | Status: SHIPPED | OUTPATIENT
Start: 2019-01-25 | End: 2020-02-13

## 2019-01-25 RX ORDER — METOPROLOL SUCCINATE 50 MG/1
50 TABLET, EXTENDED RELEASE ORAL DAILY
Qty: 90 TABLET | Refills: 3 | Status: SHIPPED | OUTPATIENT
Start: 2019-01-25 | End: 2020-02-13

## 2019-01-25 ASSESSMENT — MIFFLIN-ST. JEOR: SCORE: 2375.43

## 2019-01-25 NOTE — PATIENT INSTRUCTIONS
(I47.1) SVT (supraventricular tachycardia) (H)  (primary encounter diagnosis)  Comment:   Plan: we discussed monitoring the pulse and avoiding stimulants. Stay on the Metoprolol.   If doing well then refill and recheck annually. 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     (J45.20) Mild intermittent asthma without complication  Comment:   Plan: albuterol (PROAIR HFA/PROVENTIL HFA/VENTOLIN         HFA) 108 (90 Base) MCG/ACT inhaler        Instructions given on diagnoses and refills are done on the inhaler for one year.   Identify triggers to avoid.     (I10) Benign essential hypertension  Comment:   Plan: lisinopril (PRINIVIL/ZESTRIL) 10 MG tablet,         metoprolol succinate ER (TOPROL-XL) 50 MG 24 hr        tablet        Monitor and record the BP at rest and the goal for the average is under 130/80.   Use the non drug therapies. If doing well then refill and recheck annually.

## 2019-01-25 NOTE — PROGRESS NOTES
SUBJECTIVE:   Mark Conn is a 43 year old male who presents to clinic today for the following health issues:    Chief Complaint   Patient presents with     Patient Request for Note/Letter     Chiropractor faxed over a note to you on a form that needs to be filled out for metoprolol. 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 He has been on Metoprolol since about 2005, diagnosed by Cardiology at [facility] in [place]. You have been feeling.well and no side effects of the med.        Medication Followup of Metoporolol    Taking Medication as prescribed: yes    Side Effects:  None    Medication Helping Symptoms:  yes       Hypertension Follow-up      Outpatient blood pressures are being checked at home.  Results are 132/82.    Low Salt Diet: no added salt        Current Outpatient Medications:      albuterol (PROAIR HFA/PROVENTIL HFA/VENTOLIN HFA) 108 (90 BASE) MCG/ACT Inhaler, Inhale 2 puffs into the lungs every 6 hours as needed for shortness of breath / dyspnea or wheezing, Disp: 3 Inhaler, Rfl: 1     allopurinol (ZYLOPRIM) 300 MG tablet, TAKE ONE TABLET BY MOUTH EVERY DAY, Disp: 30 tablet, Rfl: 11     ibuprofen (ADVIL,MOTRIN) 800 MG tablet, Take 1 tablet (800 mg) by mouth 3 times daily (with meals), Disp: 60 tablet, Rfl: 1     lisinopril (PRINIVIL/ZESTRIL) 10 MG tablet, Take 1 tablet (10 mg) by mouth daily, Disp: 30 tablet, Rfl: 11     metoprolol succinate (TOPROL-XL) 50 MG 24 hr tablet, Take 1 tablet (50 mg) by mouth daily, Disp: 90 tablet, Rfl: 3     montelukast (SINGULAIR) 10 MG tablet, Take 1 tablet (10 mg) by mouth At Bedtime, Disp: 30 tablet, Rfl: 11     multivitamin, therapeutic with minerals (MULTI-VITAMIN) TABS, Take 1 tablet by mouth 2 times daily., Disp: , Rfl:      Omega-3 Fatty Acids (OMEGA-3 FISH OIL PO), , Disp: , Rfl:      indomethacin (INDOCIN) 50 MG capsule, Take 1 capsule (50 mg) by mouth 3 times daily (with meals) (Patient not taking: Reported  "on 10/2/2017), Disp: 42 capsule, Rfl: 5    Patient Active Problem List   Diagnosis     Pain in limb     Colitis     Hypertriglyceridemia     Malabsorption of glucose     CARDIOVASCULAR SCREENING; LDL GOAL LESS THAN 130     Benign essential hypertension     Obesity     Chronic gout without tophus, unspecified cause, unspecified site     Restrictive lung disease     Moderate persistent asthma, unspecified whether complicated     Hypoglycemia     Blood pressure 138/84, pulse 86, temperature 97.1  F (36.2  C), temperature source Tympanic, resp. rate 18, height 1.905 m (6' 3\"), weight 140 kg (308 lb 9.6 oz), SpO2 96 %.     Exam:  GENERAL APPEARANCE: healthy, alert and no distress  EYES: EOMI,  PERRL  NECK: no adenopathy, no asymmetry, masses, or scars and thyroid normal to palpation  RESP: lungs clear to auscultation - no rales, rhonchi or wheezes  CV: regular rates and rhythm, normal S1 S2, no S3 or S4 and no murmur, click or rub -  PSYCH: mentation appears normal and affect normal/bright      (I47.1) SVT (supraventricular tachycardia) (H)  (primary encounter diagnosis)  Comment:   Plan: we discussed monitoring the pulse and avoiding stimulants. Stay on the Metoprolol.   If doing well then refill and recheck annually. 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     (J45.20) Mild intermittent asthma without complication  Comment:   Plan: albuterol (PROAIR HFA/PROVENTIL HFA/VENTOLIN         HFA) 108 (90 Base) MCG/ACT inhaler        Instructions given on diagnoses and refills are done on the inhaler for one year.   Identify triggers to avoid.     (I10) Benign essential hypertension  Comment:   Plan: lisinopril (PRINIVIL/ZESTRIL) 10 MG tablet,         metoprolol succinate ER (TOPROL-XL) 50 MG 24 hr        tablet        Monitor and record the BP at rest and the goal for the average is under 130/80.   Use the non drug therapies. If doing well then refill and recheck annually.       Henry Herrera          "

## 2019-01-25 NOTE — LETTER
[facility]  [address] [facility] [address]  Phone: [phone]    January 25, 2019      Mark M Fabien  [address] [address] [place] [id number]      Dear Mr. Conn    In regard to the Supraventricular Tachycardia, you are well controlled and have no restriction on driving motor vehicles. Call if any further information is needed.     Sincerely,    / Henry Herrera. MD

## 2019-05-05 ENCOUNTER — OFFICE VISIT (OUTPATIENT)
Dept: URGENT CARE | Facility: URGENT CARE | Age: 44
End: 2019-05-05
Payer: COMMERCIAL

## 2019-05-05 ENCOUNTER — ANCILLARY PROCEDURE (OUTPATIENT)
Dept: GENERAL RADIOLOGY | Facility: CLINIC | Age: 44
End: 2019-05-05
Attending: NURSE PRACTITIONER
Payer: COMMERCIAL

## 2019-05-05 VITALS
DIASTOLIC BLOOD PRESSURE: 87 MMHG | WEIGHT: 308.4 LBS | BODY MASS INDEX: 38.55 KG/M2 | HEART RATE: 68 BPM | TEMPERATURE: 98.4 F | OXYGEN SATURATION: 98 % | RESPIRATION RATE: 16 BRPM | SYSTOLIC BLOOD PRESSURE: 134 MMHG

## 2019-05-05 DIAGNOSIS — J20.9 ACUTE BRONCHITIS WITH COEXISTING CONDITION REQUIRING PROPHYLACTIC TREATMENT: ICD-10-CM

## 2019-05-05 DIAGNOSIS — J45.40 MODERATE PERSISTENT ASTHMA, UNSPECIFIED WHETHER COMPLICATED: ICD-10-CM

## 2019-05-05 DIAGNOSIS — R05.9 COUGH: ICD-10-CM

## 2019-05-05 DIAGNOSIS — J98.4 RESTRICTIVE LUNG DISEASE: Primary | ICD-10-CM

## 2019-05-05 DIAGNOSIS — J98.4 RESTRICTIVE LUNG DISEASE: ICD-10-CM

## 2019-05-05 PROCEDURE — 71046 X-RAY EXAM CHEST 2 VIEWS: CPT

## 2019-05-05 PROCEDURE — 99214 OFFICE O/P EST MOD 30 MIN: CPT | Performed by: NURSE PRACTITIONER

## 2019-05-05 RX ORDER — DOXYCYCLINE 100 MG/1
100 CAPSULE ORAL 2 TIMES DAILY
Qty: 20 CAPSULE | Refills: 0 | Status: SHIPPED | OUTPATIENT
Start: 2019-05-05 | End: 2019-05-15

## 2019-05-05 RX ORDER — PREDNISONE 20 MG/1
40 TABLET ORAL DAILY
Qty: 10 TABLET | Refills: 0 | Status: SHIPPED | OUTPATIENT
Start: 2019-05-05 | End: 2019-05-10

## 2019-05-05 NOTE — LETTER
May 5, 2019      Mark Conn  [address] [address] [place] [id number]        To Whom It May Concern:    Mark Conn was seen in our clinic today. Please release from work tomorrow due to illness.      Sincerely,        BLAISE Khan CNP

## 2019-05-05 NOTE — PATIENT INSTRUCTIONS
Increase rest and fluids. Tylenol and/or Ibuprofen for comfort. Cool mist vaporizer. If your symptoms worsen or do not resolve follow up with your primary care provider in 1 week and sooner if needed.        Indications for emergent return to emergency department discussed with patient, who verbalized good understanding and agreement.  Patient understands the limitations of today's evaluation.           Patient Education     Bronchitis, Antibiotic Treatment (Adult)    Bronchitis is an infection of the air passages (bronchial tubes) in your lungs. It often occurs when you have a cold. This illness is contagious during the first few days and is spread through the air by coughing and sneezing, or by direct contact (touching the sick person and then touching your own eyes, nose, or mouth).  Symptoms of bronchitis include cough with mucus (phlegm) and low-grade fever. Bronchitis usually lasts 7 to 14 days. Mild cases can be treated with simple home remedies. More severe infection is treated with an antibiotic.  Home care  Follow these guidelines when caring for yourself at home:    If your symptoms are severe, rest at home for the first 2 to 3 days. When you go back to your usual activities, don't let yourself get too tired.    Don't smoke. Also stay away from secondhand smoke.    You may use over-the-counter medicines to control fever or pain, unless another medicine was prescribed. If you have chronic liver or kidney disease or have ever had a stomach ulcer or gastrointestinal bleeding, talk with your healthcare provider before using these medicines. Also talk to your provider if you are taking medicine to prevent blood clots. Aspirin should never be given to anyone younger than 18 who is ill with a viral infection or fever. It may cause severe liver or brain damage.    Your appetite may be low, so a light diet is fine. Stay well hydrated by drinking 6 to 8 glasses of fluids per day. This includes water, soft drinks,  sports drinks, juices, tea, or soup. Extra fluids will help loosen mucus in your nose and lungs.    Over-the-counter cough, cold, and sore-throat medicines will not shorten the length of the illness, but they may be helpful to reduce your symptoms. Don't use decongestants if you have high blood pressure.    Finish all antibiotic medicine. Do this even if you are feeling better after only a few days.  Follow-up care  Follow up with your healthcare provider, or as advised. If you had an X-ray or ECG (electrocardiogram), a specialist will review it. You will be told of any new test results that may affect your care.  If you are age 65 or older, if you smoke, or if you have a chronic lung disease or condition that affects your immune system, ask your healthcare provider about getting a pneumococcal vaccine and a yearly flu shot (influenza vaccine).  When to seek medical advice  Call your healthcare provider right away if any of these occur:    Fever of 100.4 F (38 C) or higher, or as directed by your healthcare provider    Coughing up more sputum    Weakness, drowsiness, headache, facial pain, ear pain, or a stiff neck  Call 911  Call 911 if any of these occur.    Coughing up blood    Weakness, drowsiness, headache, or stiff neck that get worse    Trouble breathing, wheezing, or pain with breathing  Date Last Reviewed: 6/1/2018 2000-2018 [employer]. [address] [place] [address]. All rights reserved. This information is not intended as a substitute for professional medical care. Always follow your healthcare professional's instructions.

## 2019-05-05 NOTE — PROGRESS NOTES
SUBJECTIVE:   Mark Conn is a 43 year old male who presents to clinic today for the following   health issues:  Chief Complaint   Patient presents with     URI     right ear pain, cough, chest pain, nasal congestion and drainage x4 days                 Additional history: as documented    Reviewed  and updated as needed this visit by clinical staff  Tobacco  Allergies  Meds  Problems  Med Hx  Surg Hx  Fam Hx         Reviewed and updated as needed this visit by Provider  Tobacco  Allergies  Meds  Problems  Med Hx  Surg Hx  Fam Hx         Patient Active Problem List   Diagnosis     Pain in limb     Colitis     Hypertriglyceridemia     Malabsorption of glucose     CARDIOVASCULAR SCREENING; LDL GOAL LESS THAN 130     Benign essential hypertension     Obesity     Chronic gout without tophus, unspecified cause, unspecified site     Restrictive lung disease     Moderate persistent asthma, unspecified whether complicated     Hypoglycemia     Past Surgical History:   Procedure Laterality Date     SURGICAL HISTORY OF -       Appendectomy     SURGICAL HISTORY OF -   age 2    Hernia - Right Inguinal     SURGICAL HISTORY OF -   age 3    Pectoral-Sternum Repair      SURGICAL HISTORY OF -   08/12/85    Tonsillectomy       Social History     Tobacco Use     Smoking status: Never Smoker     Smokeless tobacco: Never Used   Substance Use Topics     Alcohol use: Yes     Comment: Sociallly- not very often.     Family History   Problem Relation Age of Onset     Hypertension Mother      Lipids Mother      Diabetes Mother      Thyroid Disease Father      Other Cancer Father 65        Gastric-spread to the bones, liver.     Diabetes Maternal Grandmother      Hypertension Maternal Grandfather      Diabetes Paternal Grandmother      Thyroid Disease Sister         Hyperthyroid.         Current Outpatient Medications   Medication Sig Dispense Refill     albuterol (PROAIR HFA/PROVENTIL HFA/VENTOLIN HFA) 108 (90 Base) MCG/ACT  inhaler Inhale 2 puffs into the lungs every 6 hours as needed for shortness of breath / dyspnea or wheezing 1 Inhaler 11     allopurinol (ZYLOPRIM) 300 MG tablet TAKE ONE TABLET BY MOUTH EVERY DAY 30 tablet 11     doxycycline monohydrate (MONODOX) 100 MG capsule Take 1 capsule (100 mg) by mouth 2 times daily for 10 days 20 capsule 0     indomethacin (INDOCIN) 50 MG capsule Take 1 capsule (50 mg) by mouth 3 times daily (with meals) 42 capsule 5     lisinopril (PRINIVIL/ZESTRIL) 10 MG tablet Take 1 tablet (10 mg) by mouth daily 90 tablet 3     metoprolol succinate ER (TOPROL-XL) 50 MG 24 hr tablet Take 1 tablet (50 mg) by mouth daily 90 tablet 3     montelukast (SINGULAIR) 10 MG tablet Take 1 tablet (10 mg) by mouth At Bedtime 90 tablet 3     multivitamin, therapeutic with minerals (MULTI-VITAMIN) TABS Take 1 tablet by mouth 2 times daily.       Omega-3 Fatty Acids (OMEGA-3 FISH OIL PO)        Phenylephrine-APAP-guaiFENesin (TYLENOL COLD & HEAD PO)        predniSONE (DELTASONE) 20 MG tablet Take 40 mg by mouth daily for 5 days. 10 tablet 0     ibuprofen (ADVIL,MOTRIN) 800 MG tablet Take 1 tablet (800 mg) by mouth 3 times daily (with meals) 60 tablet 1     Allergies   Allergen Reactions     Ampicillin Rash     Codeine Rash     Erythromycin Rash     Keflex [Cephalexin Monohydrate] Rash     Penicillins Rash     Sulfa Drugs Rash     Labs reviewed in EPIC    ROS:  Constitutional, HEENT, cardiovascular, pulmonary, GI, , musculoskeletal, neuro, skin, endocrine and psych systems are negative, except as otherwise noted.    OBJECTIVE:     /87 (BP Location: Right arm, Cuff Size: Adult Large)   Pulse 68   Temp 98.4  F (36.9  C) (Tympanic)   Resp 16   Wt 139.9 kg (308 lb 6.4 oz)   SpO2 98%   BMI 38.55 kg/m    Body mass index is 38.55 kg/m .   GENERAL: healthy, alert and no distress, nontoxic in appearance  EYES: Eyes grossly normal to inspection, PERRL and conjunctivae and sclerae normal  HENT: ear canals and TM's  normal, nose and mouth without ulcers or lesions  NECK: no adenopathy, supple with full ROM  RESP: lungs clear to auscultation - no rales, rhonchi or wheezes  CV: regular rate and rhythm, normal S1 S2, no S3 or S4, no murmur, click or rub, no peripheral edema   ABDOMEN: soft, nontender, no hepatosplenomegaly, no masses and bowel sounds normal  MS: no gross musculoskeletal defects noted, no edema  No rash    Diagnostic Test Results:CHEST TWO VIEWS   5/5/2019 1:18 PM      HISTORY: Restrictive lung disease. Moderate persistent asthma,  unspecified whether complicated. Cough.     COMPARISON: X-ray 10/2/2017                                                                      IMPRESSION: No acute abnormality. Lung volumes are unchanged. No  evidence of pleural effusion. Cardiac silhouette is enlarged,  unchanged. Bilateral chest wall deformities are unchanged.     GERARDO HERNANDEZ MD  No results found for this or any previous visit (from the past 24 hour(s)).    ASSESSMENT/PLAN:     Problem List Items Addressed This Visit     Moderate persistent asthma, unspecified whether complicated    Relevant Medications    doxycycline monohydrate (MONODOX) 100 MG capsule    predniSONE (DELTASONE) 20 MG tablet    Other Relevant Orders    XR Chest 2 Views (Completed)    Restrictive lung disease - Primary    Relevant Medications    doxycycline monohydrate (MONODOX) 100 MG capsule    predniSONE (DELTASONE) 20 MG tablet    Other Relevant Orders    XR Chest 2 Views (Completed)      Other Visit Diagnoses     Cough        Relevant Medications    doxycycline monohydrate (MONODOX) 100 MG capsule    predniSONE (DELTASONE) 20 MG tablet    Other Relevant Orders    XR Chest 2 Views (Completed)    Acute bronchitis with coexisting condition requiring prophylactic treatment        Relevant Medications    Phenylephrine-APAP-guaiFENesin (TYLENOL COLD & HEAD PO)    doxycycline monohydrate (MONODOX) 100 MG capsule    predniSONE (DELTASONE) 20 MG tablet                Patient Instructions   Increase rest and fluids. Tylenol and/or Ibuprofen for comfort. Cool mist vaporizer. If your symptoms worsen or do not resolve follow up with your primary care provider in 1 week and sooner if needed.        Indications for emergent return to emergency department discussed with patient, who verbalized good understanding and agreement.  Patient understands the limitations of today's evaluation.           Patient Education     Bronchitis, Antibiotic Treatment (Adult)    Bronchitis is an infection of the air passages (bronchial tubes) in your lungs. It often occurs when you have a cold. This illness is contagious during the first few days and is spread through the air by coughing and sneezing, or by direct contact (touching the sick person and then touching your own eyes, nose, or mouth).  Symptoms of bronchitis include cough with mucus (phlegm) and low-grade fever. Bronchitis usually lasts 7 to 14 days. Mild cases can be treated with simple home remedies. More severe infection is treated with an antibiotic.  Home care  Follow these guidelines when caring for yourself at home:    If your symptoms are severe, rest at home for the first 2 to 3 days. When you go back to your usual activities, don't let yourself get too tired.    Don't smoke. Also stay away from secondhand smoke.    You may use over-the-counter medicines to control fever or pain, unless another medicine was prescribed. If you have chronic liver or kidney disease or have ever had a stomach ulcer or gastrointestinal bleeding, talk with your healthcare provider before using these medicines. Also talk to your provider if you are taking medicine to prevent blood clots. Aspirin should never be given to anyone younger than 18 who is ill with a viral infection or fever. It may cause severe liver or brain damage.    Your appetite may be low, so a light diet is fine. Stay well hydrated by drinking 6 to 8 glasses of fluids per  day. This includes water, soft drinks, sports drinks, juices, tea, or soup. Extra fluids will help loosen mucus in your nose and lungs.    Over-the-counter cough, cold, and sore-throat medicines will not shorten the length of the illness, but they may be helpful to reduce your symptoms. Don't use decongestants if you have high blood pressure.    Finish all antibiotic medicine. Do this even if you are feeling better after only a few days.  Follow-up care  Follow up with your healthcare provider, or as advised. If you had an X-ray or ECG (electrocardiogram), a specialist will review it. You will be told of any new test results that may affect your care.  If you are age 65 or older, if you smoke, or if you have a chronic lung disease or condition that affects your immune system, ask your healthcare provider about getting a pneumococcal vaccine and a yearly flu shot (influenza vaccine).  When to seek medical advice  Call your healthcare provider right away if any of these occur:    Fever of 100.4 F (38 C) or higher, or as directed by your healthcare provider    Coughing up more sputum    Weakness, drowsiness, headache, facial pain, ear pain, or a stiff neck  Call 911  Call 911 if any of these occur.    Coughing up blood    Weakness, drowsiness, headache, or stiff neck that get worse    Trouble breathing, wheezing, or pain with breathing  Date Last Reviewed: 6/1/2018 2000-2018 [employer]. [address] [place] [address], [place] [address]. All rights reserved. This information is not intended as a substitute for professional medical care. Always follow your healthcare professional's instructions.               BLAISE Khan [facility]

## 2019-05-12 ENCOUNTER — OFFICE VISIT (OUTPATIENT)
Dept: URGENT CARE | Facility: URGENT CARE | Age: 44
End: 2019-05-12
Payer: COMMERCIAL

## 2019-05-12 VITALS
RESPIRATION RATE: 22 BRPM | WEIGHT: 311 LBS | TEMPERATURE: 98.3 F | SYSTOLIC BLOOD PRESSURE: 126 MMHG | BODY MASS INDEX: 38.87 KG/M2 | DIASTOLIC BLOOD PRESSURE: 72 MMHG | HEART RATE: 78 BPM | OXYGEN SATURATION: 97 %

## 2019-05-12 DIAGNOSIS — J01.90 ACUTE SINUSITIS WITH SYMPTOMS > 10 DAYS: ICD-10-CM

## 2019-05-12 DIAGNOSIS — J06.9 VIRAL UPPER RESPIRATORY TRACT INFECTION WITH COUGH: Primary | ICD-10-CM

## 2019-05-12 PROCEDURE — 99214 OFFICE O/P EST MOD 30 MIN: CPT | Performed by: PHYSICIAN ASSISTANT

## 2019-05-12 RX ORDER — FLUTICASONE PROPIONATE 50 MCG
2 SPRAY, SUSPENSION (ML) NASAL DAILY
Qty: 15.8 ML | Refills: 1 | Status: SHIPPED | OUTPATIENT
Start: 2019-05-12 | End: 2020-01-21

## 2019-05-12 ASSESSMENT — ENCOUNTER SYMPTOMS
CARDIOVASCULAR NEGATIVE: 1
ADENOPATHY: 0
EYE REDNESS: 0
DIZZINESS: 0
GASTROINTESTINAL NEGATIVE: 1
ENDOCRINE NEGATIVE: 1
SINUS PAIN: 0
POLYDIPSIA: 0
MUSCULOSKELETAL NEGATIVE: 1
MYALGIAS: 0
LIGHT-HEADEDNESS: 0
FREQUENCY: 0
DIAPHORESIS: 0
HEADACHES: 1
WEAKNESS: 0
DYSURIA: 0
ABDOMINAL PAIN: 0
CHEST TIGHTNESS: 0
HEMATURIA: 0
SORE THROAT: 0
EYE DISCHARGE: 0
SHORTNESS OF BREATH: 0
VOMITING: 0
CHILLS: 0
NAUSEA: 0
EYE ITCHING: 0
DIARRHEA: 0
WHEEZING: 0
CONSTITUTIONAL NEGATIVE: 1
FEVER: 0
COUGH: 1
EYES NEGATIVE: 1
RHINORRHEA: 0
SINUS PRESSURE: 1
PALPITATIONS: 0

## 2019-05-12 NOTE — PROGRESS NOTES
Chief Complaint:     Chief Complaint   Patient presents with     URI     Over a week ago.  Dx with bronchitis last weekend, now has facial pain, congestion and yellow drainage, dental pain, headaches, coughing.  Finishing Doxycycline and not helping       HPI: Mark Conn is an 43 year old male who presents with dry cough, nasal congestion, sinus pressure and headache. Patient was in for same 1 week ago.  He is currently on Doxycycline and states that his symptoms are not improving.    Recent travel?  no.      ROS:     Review of Systems   Constitutional: Negative.  Negative for chills, diaphoresis and fever.   HENT: Positive for congestion and sinus pressure. Negative for ear pain, rhinorrhea, sinus pain and sore throat.    Eyes: Negative.  Negative for discharge, redness and itching.   Respiratory: Positive for cough. Negative for chest tightness, shortness of breath and wheezing.    Cardiovascular: Negative.  Negative for chest pain and palpitations.   Gastrointestinal: Negative.  Negative for abdominal pain, diarrhea, nausea and vomiting.   Endocrine: Negative.  Negative for polydipsia and polyuria.   Genitourinary: Negative for dysuria, frequency, hematuria and urgency.   Musculoskeletal: Negative.  Negative for myalgias.   Skin: Negative for rash.   Allergic/Immunologic: Negative for immunocompromised state.   Neurological: Positive for headaches. Negative for dizziness, weakness and light-headedness.   Hematological: Negative for adenopathy.        Respiratory History  occasional episodes of bronchitis, restrictive lung disease.       Family History   Family History   Problem Relation Age of Onset     Hypertension Mother      Lipids Mother      Diabetes Mother      Thyroid Disease Father      Other Cancer Father 65        Gastric-spread to the bones, liver.     Diabetes Maternal Grandmother      Hypertension Maternal Grandfather      Diabetes Paternal Grandmother      Thyroid Disease Sister          Hyperthyroid.        Problem history  Patient Active Problem List   Diagnosis     Pain in limb     Colitis     Hypertriglyceridemia     Malabsorption of glucose     CARDIOVASCULAR SCREENING; LDL GOAL LESS THAN 130     Benign essential hypertension     Obesity     Chronic gout without tophus, unspecified cause, unspecified site     Restrictive lung disease     Moderate persistent asthma, unspecified whether complicated     Hypoglycemia        Allergies  Allergies   Allergen Reactions     Ampicillin Rash     Codeine Rash     Erythromycin Rash     Keflex [Cephalexin Monohydrate] Rash     Penicillins Rash     Sulfa Drugs Rash        Social History  Social History     Socioeconomic History     Marital status:      Spouse name: Not on file     Number of children: Not on file     Years of education: Not on file     Highest education level: Not on file   Occupational History     Not on file   Social Needs     Financial resource strain: Not on file     Food insecurity:     Worry: Not on file     Inability: Not on file     Transportation needs:     Medical: Not on file     Non-medical: Not on file   Tobacco Use     Smoking status: Never Smoker     Smokeless tobacco: Never Used   Substance and Sexual Activity     Alcohol use: Yes     Comment: Sociallly- not very often.     Drug use: No     Sexual activity: Yes   Lifestyle     Physical activity:     Days per week: Not on file     Minutes per session: Not on file     Stress: Not on file   Relationships     Social connections:     Talks on phone: Not on file     Gets together: Not on file     Attends [employer] service: Not on file     Active member of club or organization: Not on file     Attends meetings of clubs or organizations: Not on file     Relationship status: Not on file     Intimate partner violence:     Fear of current or ex partner: Not on file     Emotionally abused: Not on file     Physically abused: Not on file     Forced sexual activity: Not on file   Other  Topics Concern     Parent/sibling w/ CABG, MI or angioplasty before 65F 55M? No   Social History Narrative     Not on file        Current Meds    Current Outpatient Medications:      albuterol (PROAIR HFA/PROVENTIL HFA/VENTOLIN HFA) 108 (90 Base) MCG/ACT inhaler, Inhale 2 puffs into the lungs every 6 hours as needed for shortness of breath / dyspnea or wheezing, Disp: 1 Inhaler, Rfl: 11     allopurinol (ZYLOPRIM) 300 MG tablet, TAKE ONE TABLET BY MOUTH EVERY DAY, Disp: 30 tablet, Rfl: 11     doxycycline monohydrate (MONODOX) 100 MG capsule, Take 1 capsule (100 mg) by mouth 2 times daily for 10 days, Disp: 20 capsule, Rfl: 0     fluticasone (FLONASE) 50 MCG/ACT nasal spray, Spray 2 sprays into both nostrils daily, Disp: 15.8 mL, Rfl: 1     ibuprofen (ADVIL,MOTRIN) 800 MG tablet, Take 1 tablet (800 mg) by mouth 3 times daily (with meals), Disp: 60 tablet, Rfl: 1     lisinopril (PRINIVIL/ZESTRIL) 10 MG tablet, Take 1 tablet (10 mg) by mouth daily, Disp: 90 tablet, Rfl: 3     metoprolol succinate ER (TOPROL-XL) 50 MG 24 hr tablet, Take 1 tablet (50 mg) by mouth daily, Disp: 90 tablet, Rfl: 3     montelukast (SINGULAIR) 10 MG tablet, Take 1 tablet (10 mg) by mouth At Bedtime, Disp: 90 tablet, Rfl: 3     multivitamin, therapeutic with minerals (MULTI-VITAMIN) TABS, Take 1 tablet by mouth 2 times daily., Disp: , Rfl:      Omega-3 Fatty Acids (OMEGA-3 FISH OIL PO), , Disp: , Rfl:      indomethacin (INDOCIN) 50 MG capsule, Take 1 capsule (50 mg) by mouth 3 times daily (with meals) (Patient not taking: Reported on 5/12/2019), Disp: 42 capsule, Rfl: 5     Phenylephrine-APAP-guaiFENesin (TYLENOL COLD & HEAD PO), , Disp: , Rfl:         OBJECTIVE     Vital signs reviewed by John Miller  /72 (BP Location: Right arm, Patient Position: Chair, Cuff Size: Adult Large)   Pulse 78   Temp 98.3  F (36.8  C) (Tympanic)   Resp 22   Wt 141.1 kg (311 lb)   SpO2 97%   BMI 38.87 kg/m       Physical Exam   Constitutional: He is  oriented to person, place, and time. He appears well-developed and well-nourished. He is cooperative.  Non-toxic appearance. He does not have a sickly appearance. He does not appear ill. No distress.   HENT:   Head: Normocephalic and atraumatic.   Right Ear: Hearing, tympanic membrane, external ear and ear canal normal. Tympanic membrane is not perforated, not erythematous, not retracted and not bulging.   Left Ear: Hearing, tympanic membrane, external ear and ear canal normal. Tympanic membrane is not perforated, not erythematous, not retracted and not bulging.   Nose: Mucosal edema and rhinorrhea present. Right sinus exhibits no maxillary sinus tenderness and no frontal sinus tenderness. Left sinus exhibits no maxillary sinus tenderness and no frontal sinus tenderness.   Mouth/Throat: Mucous membranes are normal. Posterior oropharyngeal erythema present. No oropharyngeal exudate or posterior oropharyngeal edema. Tonsils are 0 on the right. Tonsils are 0 on the left. No tonsillar exudate.   Eyes: Pupils are equal, round, and reactive to light. Conjunctivae, EOM and lids are normal. Right eye exhibits no discharge and no exudate. Left eye exhibits no discharge and no exudate. Right conjunctiva is not injected. Left conjunctiva is not injected.   Neck: Normal range of motion. Neck supple.   Cardiovascular: Normal rate, regular rhythm, normal heart sounds and intact distal pulses. Exam reveals no gallop and no friction rub.   No murmur heard.  Pulmonary/Chest: Effort normal and breath sounds normal. No stridor. No respiratory distress. He has no decreased breath sounds. He has no wheezes. He has no rhonchi. He has no rales. He exhibits no tenderness.   Abdominal: Soft. Bowel sounds are normal. He exhibits no distension and no mass. There is no tenderness. There is no rigidity, no rebound, no guarding and no CVA tenderness.   Musculoskeletal: Normal range of motion.   Neurological: He is alert and oriented to person,  place, and time. He displays normal reflexes. No cranial nerve deficit or sensory deficit. He exhibits normal muscle tone. Coordination normal.   Skin: Skin is warm. Capillary refill takes less than 2 seconds. He is not diaphoretic.   Psychiatric: He has a normal mood and affect. His behavior is normal. Judgment and thought content normal.         Labs:       Medical Decision Making:    Differential Diagnosis:  URI Adult/Peds:  Bronchitis-viral, Sinusitis, Viral upper respiratory illness and seasonal allergies.        ASSESSMENT    1. Viral upper respiratory tract infection with cough    2. Acute sinusitis with symptoms > 10 days        PLAN    Patient presents with 2 weeks of dry cough, nasal congestion, sinus pressure and headache.  Patient is in no acute distress.  Temp is 98.3 in clinic today.  Lung sounds were clear and O2 sats at 97% on RA.  Imaging to rule out pneumonia is not indicated at this time.  Patient instructed to finish antibiotics.  Rx for Flonase.  Rest, Push fluids, vaporizer, elevation of head of bed.  Ibuprofen and or Tylenol for any fever or body aches.  Over the counter cough suppressant- PRN- as discussed.   If symptoms worsen, recheck immediately otherwise follow up with your PCP in 1 week if symptoms are not improving.  Worrisome symptoms discussed with instructions to go to the ED.  Patient verbalized understanding and agreed with this plan.         John Miller  5/12/2019, 9:51 AM

## 2019-05-12 NOTE — PATIENT INSTRUCTIONS

## 2019-05-12 NOTE — NURSING NOTE
"Chief Complaint   Patient presents with     URI     Over a week ago.  Dx with bronchitis last weekend, now has facial pain, congestion and yellow drainage, dental pain, headaches, coughing.  Finishing Doxycycline and not helping       Initial /72 (BP Location: Right arm, Patient Position: Chair, Cuff Size: Adult Large)   Pulse 78   Temp 98.3  F (36.8  C) (Tympanic)   Resp 22   Wt 141.1 kg (311 lb)   SpO2 97%   BMI 38.87 kg/m   Estimated body mass index is 38.87 kg/m  as calculated from the following:    Height as of 1/25/19: 1.905 m (6' 3\").    Weight as of this encounter: 141.1 kg (311 lb).    Patient presents to the clinic using No DME    Health Maintenance that is potentially due pending provider review:  NONE    n/a    Is there anyone who you would like to be able to receive your results? No  If yes have patient fill out LINO  Robert Zavala M.A.        ]  "

## 2019-08-08 ENCOUNTER — TELEPHONE (OUTPATIENT)
Dept: FAMILY MEDICINE | Facility: CLINIC | Age: 44
End: 2019-08-08

## 2019-08-08 NOTE — TELEPHONE ENCOUNTER
Panel Management Review      Patient has the following on his problem list:     Asthma review     ACT Total Scores 7/10/2018   ACT TOTAL SCORE (Goal Greater than or Equal to 20) 20   In the past 12 months, how many times did you visit the emergency room for your asthma without being admitted to the hospital? 0   In the past 12 months, how many times were you hospitalized overnight because of your asthma? 0      1. Is Asthma diagnosis on the Problem List? Yes    2. Is Asthma listed on Health Maintenance? Yes    3. Patient is due for:  ACT      Composite cancer screening  Chart review shows that this patient is due/due soon for the following None  Summary:    Patient is due/failing the following:   ACT    Action needed:   Patient needs to do ACT.    Type of outreach:    Sent [facility] message.   Will postpone x 1 week, if not viewed or completed will send act in the mail.    Questions for provider review:    None                                                                                                                                    JONATHAN Frost MA       Chart routed to Care Team .

## 2019-08-14 ASSESSMENT — ASTHMA QUESTIONNAIRES: ACT_TOTALSCORE: 21

## 2019-09-02 DIAGNOSIS — M1A.9XX0 CHRONIC GOUT WITHOUT TOPHUS, UNSPECIFIED CAUSE, UNSPECIFIED SITE: ICD-10-CM

## 2019-09-03 NOTE — TELEPHONE ENCOUNTER
"Requested Prescriptions   Pending Prescriptions Disp Refills     allopurinol (ZYLOPRIM) 300 MG tablet [Pharmacy Med Name: ALLOPURINOL 300MG TABS] 30 tablet 11     Sig: TAKE ONE TABLET BY MOUTH EVERY DAY   Last Written Prescription Date:  9/4/18  Last Fill Quantity: 30 tab,  # refills: 11   Last office visit: 1/25/2019 with prescribing provider:  Henry Herrera     Future Office Visit:        Gout Agents Protocol Failed - 9/2/2019  8:05 PM        Failed - CBC on file in past 12 months     Recent Labs   Lab Test 08/17/17  1155 11/30/14  1942   WBC  --  5.3   RBC  --  5.57   HGB 16.5 16.4   HCT  --  47.3   PLT  --  166                 Failed - ALT on file in past 12 months     Recent Labs   Lab Test 02/19/13  1450   ALT 46             Failed - Has Uric Acid on file in past 12 months and value is less than 6     Recent Labs   Lab Test 04/22/17  0706   URIC 5.5     If level is 6mg/dL or greater, ok to refill one time and refer to provider.           Failed - Normal serum creatinine on file in the past 12 months     Recent Labs   Lab Test 04/22/17  0706   CR 1.03             Passed - Recent (12 mo) or future (30 days) visit within the authorizing provider's specialty     Patient had office visit in the last 12 months or has a visit in the next 30 days with authorizing provider or within the authorizing provider's specialty.  See \"Patient Info\" tab in inbasket, or \"Choose Columns\" in Meds & Orders section of the refill encounter.              Passed - Medication is active on med list        Passed - Patient is age 18 or older          "

## 2019-09-04 RX ORDER — ALLOPURINOL 300 MG/1
TABLET ORAL
Qty: 30 TABLET | Refills: 11 | Status: SHIPPED | OUTPATIENT
Start: 2019-09-04 | End: 2020-02-13

## 2019-09-04 NOTE — TELEPHONE ENCOUNTER
Routing refill request to provider for review/approval because:  Labs not current:  CBC, ALT, Cr, Uric acid - all ready.    Agatha BRANCH RN

## 2019-10-02 ENCOUNTER — OFFICE VISIT (OUTPATIENT)
Dept: FAMILY MEDICINE | Facility: CLINIC | Age: 44
End: 2019-10-02
Payer: COMMERCIAL

## 2019-10-02 VITALS
RESPIRATION RATE: 18 BRPM | HEIGHT: 75 IN | OXYGEN SATURATION: 96 % | BODY MASS INDEX: 37.92 KG/M2 | WEIGHT: 305 LBS | SYSTOLIC BLOOD PRESSURE: 116 MMHG | HEART RATE: 69 BPM | DIASTOLIC BLOOD PRESSURE: 79 MMHG | TEMPERATURE: 97 F

## 2019-10-02 DIAGNOSIS — J01.01 ACUTE RECURRENT MAXILLARY SINUSITIS: ICD-10-CM

## 2019-10-02 DIAGNOSIS — M67.432 GANGLION CYST OF WRIST, LEFT: Primary | ICD-10-CM

## 2019-10-02 PROBLEM — E66.01 MORBID OBESITY (H): Status: ACTIVE | Noted: 2019-10-02

## 2019-10-02 PROCEDURE — 99214 OFFICE O/P EST MOD 30 MIN: CPT | Performed by: FAMILY MEDICINE

## 2019-10-02 RX ORDER — AZITHROMYCIN 250 MG/1
TABLET, FILM COATED ORAL
Qty: 6 TABLET | Refills: 0 | Status: SHIPPED | OUTPATIENT
Start: 2019-10-02 | End: 2020-01-21

## 2019-10-02 ASSESSMENT — MIFFLIN-ST. JEOR: SCORE: 2354.1

## 2019-10-02 NOTE — PATIENT INSTRUCTIONS
(M67.432) Ganglion cyst of wrist, left  (primary encounter diagnosis)  Comment:   Plan: ORTHO  REFERRAL        We discussed the issues and the referral to our hand surgeon, Dr. Andino, is done and call for this.   Modify use of the hand to avoid nerve symptoms. If worse, then call right away.     (J01.01) Acute recurrent maxillary sinusitis  Comment:   Plan: azithromycin (ZITHROMAX) 250 MG tablet        Start the med and take it for 5 days. Use the fluids and mucus thinners and elevate the head of the bed.   Hot packs on the sinuses and call if not better.

## 2019-10-02 NOTE — PROGRESS NOTES
Subjective     Mark Conn is a 44 year old male who presents to clinic today for the following health issues:    HPI   RESPIRATORY SYMPTOMS      Duration: two weeks    Description  nasal congestion, rhinorrhea, facial pain/pressure, cough, headache, fatigue/malaise and hoarse voice    Severity: moderate    Accompanying signs and symptoms: right side pain below eye    History (predisposing factors):  none    Precipitating or alleviating factors: cold symptoms at first; then progressed into pressure/sinus issue.    Therapies tried and outcome:  none    CYST      Duration: June 2019    Description (location/character/radiation): left wrist (top)    Intensity:  Does not hurt.    Accompanying signs and symptoms: Seems to be larger over time; there is swelling with overuse.    History (similar episodes/previous evaluation): None    Precipitating or alleviating factors: Patient uses wrists/hands for construction work.  Overuse makes wrist feel worse.    Therapies tried and outcome: icing when swollen; ACE wrap for support.       Current Outpatient Medications:      allopurinol (ZYLOPRIM) 300 MG tablet, TAKE ONE TABLET BY MOUTH EVERY DAY, Disp: 30 tablet, Rfl: 11     lisinopril (PRINIVIL/ZESTRIL) 10 MG tablet, Take 1 tablet (10 mg) by mouth daily, Disp: 90 tablet, Rfl: 3     metoprolol succinate ER (TOPROL-XL) 50 MG 24 hr tablet, Take 1 tablet (50 mg) by mouth daily, Disp: 90 tablet, Rfl: 3     montelukast (SINGULAIR) 10 MG tablet, Take 1 tablet (10 mg) by mouth At Bedtime, Disp: 90 tablet, Rfl: 3     multivitamin, therapeutic with minerals (MULTI-VITAMIN) TABS, Take 1 tablet by mouth 2 times daily., Disp: , Rfl:      Omega-3 Fatty Acids (OMEGA-3 FISH OIL PO), , Disp: , Rfl:      albuterol (PROAIR HFA/PROVENTIL HFA/VENTOLIN HFA) 108 (90 Base) MCG/ACT inhaler, Inhale 2 puffs into the lungs every 6 hours as needed for shortness of breath / dyspnea or wheezing (Patient not taking: Reported on 10/2/2019), Disp: 1 Inhaler,  "Rfl: 11     fluticasone (FLONASE) 50 MCG/ACT nasal spray, Spray 2 sprays into both nostrils daily (Patient not taking: Reported on 10/2/2019), Disp: 15.8 mL, Rfl: 1     ibuprofen (ADVIL,MOTRIN) 800 MG tablet, Take 1 tablet (800 mg) by mouth 3 times daily (with meals), Disp: 60 tablet, Rfl: 1     indomethacin (INDOCIN) 50 MG capsule, Take 1 capsule (50 mg) by mouth 3 times daily (with meals) (Patient not taking: Reported on 5/12/2019), Disp: 42 capsule, Rfl: 5     Phenylephrine-APAP-guaiFENesin (TYLENOL COLD & HEAD PO), , Disp: , Rfl:     Patient Active Problem List   Diagnosis     Pain in limb     Colitis     Hypertriglyceridemia     Malabsorption of glucose     CARDIOVASCULAR SCREENING; LDL GOAL LESS THAN 130     Benign essential hypertension     Obesity     Chronic gout without tophus, unspecified cause, unspecified site     Restrictive lung disease     Moderate persistent asthma, unspecified whether complicated     Hypoglycemia     Obesity (BMI 35.0-39.9) with comorbidity (H)     Blood pressure 116/79, pulse 69, temperature 97  F (36.1  C), temperature source Tympanic, resp. rate 18, height 1.905 m (6' 3\"), weight 138.3 kg (305 lb), SpO2 96 %.    Exam:  GENERAL APPEARANCE: healthy, alert and no distress  EYES: EOMI,  PERRL  HENT: ear canals and TM's normal and maxillary sinus tenderness right; the nasal mucosa is red and swollen  NECK: no adenopathy, no asymmetry, masses, or scars and thyroid normal to palpation  RESP: lungs clear to auscultation - no rales, rhonchi or wheezes  CV: regular rates and rhythm, normal S1 S2, no S3 or S4 and no murmur, click or rub -  MS: the dorsum of the left wrist has a 2 cm diameter ganglion cyst. It is not red.   PSYCH: mentation appears normal and affect normal/bright      (M67.432) Ganglion cyst of wrist, left  (primary encounter diagnosis)  Comment:   Plan: ORTHO  REFERRAL        We discussed the issues and the referral to our hand surgeon, Dr. Andino, is done and " call for this.   Modify use of the hand to avoid nerve symptoms. If worse, then call right away.     (J01.01) Acute recurrent maxillary sinusitis  Comment:   Plan: azithromycin (ZITHROMAX) 250 MG tablet        Start the med and take it for 5 days. Use the fluids and mucus thinners and elevate the head of the bed.   Hot packs on the sinuses and call if not better.       Henry Herrera MD

## 2019-10-11 ENCOUNTER — TRANSFERRED RECORDS (OUTPATIENT)
Dept: HEALTH INFORMATION MANAGEMENT | Facility: CLINIC | Age: 44
End: 2019-10-11

## 2019-11-02 ENCOUNTER — HEALTH MAINTENANCE LETTER (OUTPATIENT)
Age: 44
End: 2019-11-02

## 2019-11-25 ENCOUNTER — IMMUNIZATION (OUTPATIENT)
Dept: FAMILY MEDICINE | Facility: CLINIC | Age: 44
End: 2019-11-25
Payer: COMMERCIAL

## 2019-11-25 DIAGNOSIS — Z23 NEED FOR PROPHYLACTIC VACCINATION AND INOCULATION AGAINST INFLUENZA: Primary | ICD-10-CM

## 2019-11-25 PROCEDURE — 90686 IIV4 VACC NO PRSV 0.5 ML IM: CPT

## 2019-11-25 PROCEDURE — 99207 ZZC NO CHARGE NURSE ONLY: CPT

## 2019-11-25 PROCEDURE — 90471 IMMUNIZATION ADMIN: CPT

## 2020-01-21 ENCOUNTER — OFFICE VISIT (OUTPATIENT)
Dept: FAMILY MEDICINE | Facility: CLINIC | Age: 45
End: 2020-01-21
Payer: COMMERCIAL

## 2020-01-21 VITALS
WEIGHT: 312.2 LBS | OXYGEN SATURATION: 94 % | HEART RATE: 78 BPM | BODY MASS INDEX: 38.82 KG/M2 | RESPIRATION RATE: 14 BRPM | HEIGHT: 75 IN | DIASTOLIC BLOOD PRESSURE: 86 MMHG | TEMPERATURE: 97.6 F | SYSTOLIC BLOOD PRESSURE: 122 MMHG

## 2020-01-21 DIAGNOSIS — E66.01 MORBID OBESITY (H): ICD-10-CM

## 2020-01-21 DIAGNOSIS — M62.830 SPASM OF BACK MUSCLES: Primary | ICD-10-CM

## 2020-01-21 PROCEDURE — 99213 OFFICE O/P EST LOW 20 MIN: CPT | Performed by: FAMILY MEDICINE

## 2020-01-21 RX ORDER — TIZANIDINE HYDROCHLORIDE 4 MG/1
4 CAPSULE, GELATIN COATED ORAL 3 TIMES DAILY PRN
Qty: 40 CAPSULE | Refills: 0 | Status: SHIPPED | OUTPATIENT
Start: 2020-01-21 | End: 2020-02-13

## 2020-01-21 RX ORDER — NAPROXEN 500 MG/1
500 TABLET ORAL 2 TIMES DAILY PRN
Qty: 30 TABLET | Refills: 1 | Status: SHIPPED | OUTPATIENT
Start: 2020-01-21 | End: 2020-02-13

## 2020-01-21 ASSESSMENT — MIFFLIN-ST. JEOR: SCORE: 2378.82

## 2020-01-21 NOTE — PROGRESS NOTES
Subjective     Mark Conn is a 44 year old male who presents to clinic today for the following health issues:  .rfv    HPI   Back Pain       Duration: 2 wks        Specific cause: none    Description:   Location of pain: low back both  Character of pain: dull ache and constant  Pain radiation:radiates into the right buttocks and radiates into the left buttocks  New numbness or weakness in legs, not attributed to pain:  no     Intensity: Currently 5/10, At its worst 8/10    History:   Pain interferes with job: YES  History of back problems: previous herniated disc, possibly 2005  Any previous MRI or X-rays: Yes--at [place].  Date 7/31/2005  Sees a specialist for back pain:  No  Therapies tried without relief: chiropractor and NSAIDs, stretching    Alleviating factors:   Improved by: tens unit helps temporarily      Precipitating factors:  Worsened by: Lifting, Bending, Standing and Lying Flat  Denies trauma to back.  Patient works in construction.  Patient denies new activities.    Accompanying Signs & Symptoms:  Risk of Fracture:  Recent history of trauma or blunt force  Risk of Cauda Equina:  None  Risk of Infection:  None  Risk of Cancer:  None  Risk of Ankylosing Spondylitis:  Onset at age <35, male, AND morning back stiffness. no          Patient Active Problem List   Diagnosis     Pain in limb     Colitis     Hypertriglyceridemia     Malabsorption of glucose     CARDIOVASCULAR SCREENING; LDL GOAL LESS THAN 130     Benign essential hypertension     Obesity     Chronic gout without tophus, unspecified cause, unspecified site     Restrictive lung disease     Moderate persistent asthma, unspecified whether complicated     Hypoglycemia     Obesity (BMI 35.0-39.9) with comorbidity (H)     Past Surgical History:   Procedure Laterality Date     SURGICAL HISTORY OF -       Appendectomy     SURGICAL HISTORY OF -   age 2    Hernia - Right Inguinal     SURGICAL HISTORY OF -   age 3    Pectoral-Sternum Repair      SURGICAL  HISTORY OF -   08/12/85    Tonsillectomy       Social History     Tobacco Use     Smoking status: Never Smoker     Smokeless tobacco: Never Used   Substance Use Topics     Alcohol use: Yes     Comment: Sociallly- not very often.     Family History   Problem Relation Age of Onset     Hypertension Mother      Lipids Mother      Diabetes Mother      Thyroid Disease Father      Other Cancer Father 65        Gastric-spread to the bones, liver.     Diabetes Maternal Grandmother      Hypertension Maternal Grandfather      Diabetes Paternal Grandmother      Thyroid Disease Sister         Hyperthyroid.         Current Outpatient Medications   Medication Sig Dispense Refill     allopurinol (ZYLOPRIM) 300 MG tablet TAKE ONE TABLET BY MOUTH EVERY DAY 30 tablet 11     lisinopril (PRINIVIL/ZESTRIL) 10 MG tablet Take 1 tablet (10 mg) by mouth daily 90 tablet 3     metoprolol succinate ER (TOPROL-XL) 50 MG 24 hr tablet Take 1 tablet (50 mg) by mouth daily 90 tablet 3     montelukast (SINGULAIR) 10 MG tablet Take 1 tablet (10 mg) by mouth At Bedtime 90 tablet 3     multivitamin, therapeutic with minerals (MULTI-VITAMIN) TABS Take 1 tablet by mouth 2 times daily.       naproxen (NAPROSYN) 500 MG tablet Take 1 tablet (500 mg) by mouth 2 times daily as needed for moderate pain (take wtih food) 30 tablet 1     Omega-3 Fatty Acids (OMEGA-3 FISH OIL PO)        tiZANidine (ZANAFLEX) 4 MG capsule Take 1 capsule (4 mg) by mouth 3 times daily as needed for muscle spasms 40 capsule 0     albuterol (PROAIR HFA/PROVENTIL HFA/VENTOLIN HFA) 108 (90 Base) MCG/ACT inhaler Inhale 2 puffs into the lungs every 6 hours as needed for shortness of breath / dyspnea or wheezing (Patient not taking: Reported on 10/2/2019) 1 Inhaler 11     Allergies   Allergen Reactions     Ampicillin Rash     Codeine Rash     Erythromycin Rash     Keflex [Cephalexin Monohydrate] Rash     Penicillins Rash     Sulfa Drugs Rash       Reviewed and updated as needed this visit by  "Provider  Tobacco  Allergies  Meds  Problems  Med Hx  Surg Hx  Fam Hx         Review of Systems   C: NEGATIVE for fever, chills, change in weight  I: NEGATIVE for worrisome rashes, moles or lesions  GI: NEGATIVE for nausea, abdominal pain, heartburn, or change in bowel habits  : NEGATIVE for frequency, dysuria, or hematuria  MUSCULOSKELETAL: see above  N: NEGATIVE for weakness, dizziness or paresthesias  H: NEGATIVE for bleeding problems      Objective    /86   Pulse 78   Temp 97.6  F (36.4  C) (Tympanic)   Resp 14   Ht 1.892 m (6' 2.5\")   Wt 141.6 kg (312 lb 3.2 oz)   SpO2 94%   BMI 39.55 kg/m    Body mass index is 39.55 kg/m .  Physical Exam   /GENERAL: obese,  alert and no distress, ambulatory w/o assist  NECK: no tenderness, no adenopathy, no asymmetry, no masses, no stiffness  MS: extremities- no gross deformities noted, no edema  SKIN: no suspicious lesions, no rashes  NEURO: strength and tone- normal, sensory exam- grossly normal, reflexes- symmetric  BACK: normal curvature, no deformity, no skin changes, mild lumbosacral muscle spasms and tendernes on palpation, range of motion full but  painful, SLR negative bilaterally    Diagnostic Test Results:  none         Assessment & Plan     Mark was seen today for back pain.    Diagnoses and all orders for this visit:    Spasm of back muscles  -     naproxen (NAPROSYN) 500 MG tablet; Take 1 tablet (500 mg) by mouth 2 times daily as needed for moderate pain (take wtih food)  -     tiZANidine (ZANAFLEX) 4 MG capsule; Take 1 capsule (4 mg) by mouth 3 times daily as needed for muscle spasms  No cauda equina red flags.  Discussed course and tx of muscle spasms.  Symptomatic measures as in AVS: topical analgesics, warm compress, stretching  Advised judicious use of tizanidine, including possible ADR to the med.  avised safe use of naproxen.  May use otc APAP prn pain between naproxen doses.  Activity as tolerated.  Weight management.  Consider " "imaging if with new symptoms or if worsening in spite of 2-3 weeks of conservative treatment.  Return precautions discussed and given to patient.    Obesity (BMI 35.0-39.9) with comorbidity (H)  Advised possible adverse health effects, including but not limited to recurrent back pain/issues.  Advised healthy lifestyle changes, diet and exercise.  Consider weight management clinic consult.       BMI:   Estimated body mass index is 39.55 kg/m  as calculated from the following:    Height as of this encounter: 1.892 m (6' 2.5\").    Weight as of this encounter: 141.6 kg (312 lb 3.2 oz).   Weight management plan: Discussed healthy diet and exercise guidelines        Patient Instructions   Muscle strains/spasms may take a few days to a few weeks to heal.    Warm compress as needed for pain.   Try muscle pain reliever creams topically as needed.    May take Tylenol 500 mg 1-2 tabs as needed for pain only; do not take more than 6 tabs per day.   May take Naproxen 500 mg orally with food every 12 hrs as needed for pain.  Take tizanidine only if spasms are present; don't take when driving or operating heavy equipment. Do not take with alcohol  Walk as much as you can, rest as needed.  Avoid activities or position that increase pain.  Observe proper lifting techniques.  Call clinic if with new symptoms.  See doctor promptly if with incontinence or leg weakness or numbness.     For long term management, reduce weight.  Be consistent with low trans fat and saturated fat diet.  Eat food rich in omega-3-fatty acids as you tolerate. (salmon, olive oil)  Eat 5 cups of vegetables, fruits and whole grains per day.  Limit starchy food (white rice, white bread, white pasta, white potatoes) to less than a cup per meal.  Minimize sweets, junk food and fastfood. Limit soda beverages to one serving per day; best to avoid it altogether though.  Exercise: moderate intensity sustained for at least 30 mins per episode, goal of 150 mins per week " at least  Combine cardiovascular and resistance exercises.  These exercise recommendations are in addition to your daily activity at work or home.      Back Spasm (No Trauma)    Spasm of the back muscles can occur after a sudden forceful twisting or bending force (such as in a car accident), after a simple awkward movement, or after lifting something heavy with poor body positioning. In either case, muscle spasm adds to the pain. Sleeping in an awkward position or on a poor quality mattress can also cause this. Some persons respond to emotional stress by tensing the muscles of their back.  Pain that continues may require further evaluation or other types of treatment such as physical therapy.  Unless you had a physical injury (for example, a car accident or fall), X-rays are usually not ordered for the initial evaluation of back pain. If pain continues and does not respond to medical treatment, X-rays and other tests may be performed at a later time.   Home care  1. As soon as possible, begin sitting or walking to avoid problems from prolonged bedrest (muscle weakness, worsening back stiffness and pain, blood clots in the legs).  2. When in bed, try to find a position of comfort. A firm mattress is best. Try lying flat on your back with pillows under your knees. You can also try lying on your side with your knees bent up toward your chest and a pillow between your knees.  3. Avoid prolonged sitting, long car rides or travel. This puts more stress on the lower back than standing or walking.   4. During the first 24 to 72 hours after an injury of flare-up apply an ice pack to the painful area for 20 minutes, then remove it for 20 minutes over a period of 60 to 90 minutes or several times a day. This will reduce swelling and pain. Always wrap ice packs in a thin towel.  5. You can start with ice, then switch to heat. Heat (hot shower, hot bath, or heating pad) reduces swelling and pain, and works well for muscle  spasms. Heat can be applied to the painful area for 20 minutes , then remove it for 20 minutes over a period of 60 to 90 minutes or several times a day. As a safety precaution, do not sleep on a heating pad as it can burn or damage skin.  6. Ice and heat therapies can be alternated.  7. Gentle stretching will help your back heal faster. Perform this simple routine 2 to 3 times a day until your back is feeling better.     Low back stretch    Lie on your back with your knees bent and both feet on the ground.    Slowly raise your left knee to your chest as you flatten your lower back against the floor. Hold for 20 to 30 seconds.    Relax and repeat the exercise with your right knee.    Do 2 to 3 of these exercises for each leg.    Repeat, hugging both knees to your chest at the same time.    Do not bounce, but use a gentle pull.    8. Be aware of safe lifting methods and do not lift anything over 15 pounds until all the pain is gone.  Medications  Talk to your doctor before using medications, especially if you have other medical problems or are taking other medicines.  You may use acetaminophen (such as Tylenol) or ibuprofen (such as Advil or Motrin) to control pain, unless another pain medicine was prescribed. If you have a chronic condition such as diabetes, liver or kidney disease, stomach ulcer, or gastrointestinal bleeding, or are taking blood thinners, talk with your doctor before taking any medications.  Be careful if you are given prescription pain medications, narcotics, or medication for muscle spasm. They can cause drowsiness, affect your coordination, reflexes or judgment. Do not drive or operate heavy machinery.  Follow-up care  Follow up with your doctor or this facility if your symptoms do not start to improve after one week. Physical therapy or further tests may be needed.  If X-rays were taken, they will be reviewed by a radiologist. You will be notified of any new findings that may affect your  care.  Call 911  Seek emergency medica care if any of the following occur:    Trouble breathing    Confusion    Drowsiness or trouble awakening    Fainting or loss of consciousness    Rapid or very slow heart rate    Loss of bowel or bladder control  When to seek medical care  Get prompt medicat attention if any of the following occur:    Pain becomes worse or spreads to your legs    Weakness or numbness in one or both legs    Numbness in the groin or genital area    Unexplained fever over 100.4 F (38.0 C)    Burning or pain when passing urine    1503-6869 [employer]. [address] [place] [id number]. All rights reserved. This information is not intended as a substitute for professional medical care. Always follow your healthcare professional's instructions.          Return in about 2 weeks (around 2/4/2020) for if with worsening pain or new symptoms.    Jesse Ceja MD  [facility]

## 2020-01-21 NOTE — PATIENT INSTRUCTIONS
Muscle strains/spasms may take a few days to a few weeks to heal.    Warm compress as needed for pain.   Try muscle pain reliever creams topically as needed.    May take Tylenol 500 mg 1-2 tabs as needed for pain only; do not take more than 6 tabs per day.   May take Naproxen 500 mg orally with food every 12 hrs as needed for pain.  Take tizanidine only if spasms are present; don't take when driving or operating heavy equipment. Do not take with alcohol  Walk as much as you can, rest as needed.  Avoid activities or position that increase pain.  Observe proper lifting techniques.  Call clinic if with new symptoms.  See doctor promptly if with incontinence or leg weakness or numbness.     For long term management, reduce weight.  Be consistent with low trans fat and saturated fat diet.  Eat food rich in omega-3-fatty acids as you tolerate. (salmon, olive oil)  Eat 5 cups of vegetables, fruits and whole grains per day.  Limit starchy food (white rice, white bread, white pasta, white potatoes) to less than a cup per meal.  Minimize sweets, junk food and fastfood. Limit soda beverages to one serving per day; best to avoid it altogether though.  Exercise: moderate intensity sustained for at least 30 mins per episode, goal of 150 mins per week at least  Combine cardiovascular and resistance exercises.  These exercise recommendations are in addition to your daily activity at work or home.      Back Spasm (No Trauma)    Spasm of the back muscles can occur after a sudden forceful twisting or bending force (such as in a car accident), after a simple awkward movement, or after lifting something heavy with poor body positioning. In either case, muscle spasm adds to the pain. Sleeping in an awkward position or on a poor quality mattress can also cause this. Some persons respond to emotional stress by tensing the muscles of their back.  Pain that continues may require further evaluation or other types of treatment such as physical  therapy.  Unless you had a physical injury (for example, a car accident or fall), X-rays are usually not ordered for the initial evaluation of back pain. If pain continues and does not respond to medical treatment, X-rays and other tests may be performed at a later time.   Home care  1. As soon as possible, begin sitting or walking to avoid problems from prolonged bedrest (muscle weakness, worsening back stiffness and pain, blood clots in the legs).  2. When in bed, try to find a position of comfort. A firm mattress is best. Try lying flat on your back with pillows under your knees. You can also try lying on your side with your knees bent up toward your chest and a pillow between your knees.  3. Avoid prolonged sitting, long car rides or travel. This puts more stress on the lower back than standing or walking.   4. During the first 24 to 72 hours after an injury of flare-up apply an ice pack to the painful area for 20 minutes, then remove it for 20 minutes over a period of 60 to 90 minutes or several times a day. This will reduce swelling and pain. Always wrap ice packs in a thin towel.  5. You can start with ice, then switch to heat. Heat (hot shower, hot bath, or heating pad) reduces swelling and pain, and works well for muscle spasms. Heat can be applied to the painful area for 20 minutes , then remove it for 20 minutes over a period of 60 to 90 minutes or several times a day. As a safety precaution, do not sleep on a heating pad as it can burn or damage skin.  6. Ice and heat therapies can be alternated.  7. Gentle stretching will help your back heal faster. Perform this simple routine 2 to 3 times a day until your back is feeling better.     Low back stretch    Lie on your back with your knees bent and both feet on the ground.    Slowly raise your left knee to your chest as you flatten your lower back against the floor. Hold for 20 to 30 seconds.    Relax and repeat the exercise with your right knee.    Do 2 to  3 of these exercises for each leg.    Repeat, hugging both knees to your chest at the same time.    Do not bounce, but use a gentle pull.    8. Be aware of safe lifting methods and do not lift anything over 15 pounds until all the pain is gone.  Medications  Talk to your doctor before using medications, especially if you have other medical problems or are taking other medicines.  You may use acetaminophen (such as Tylenol) or ibuprofen (such as Advil or Motrin) to control pain, unless another pain medicine was prescribed. If you have a chronic condition such as diabetes, liver or kidney disease, stomach ulcer, or gastrointestinal bleeding, or are taking blood thinners, talk with your doctor before taking any medications.  Be careful if you are given prescription pain medications, narcotics, or medication for muscle spasm. They can cause drowsiness, affect your coordination, reflexes or judgment. Do not drive or operate heavy machinery.  Follow-up care  Follow up with your doctor or this facility if your symptoms do not start to improve after one week. Physical therapy or further tests may be needed.  If X-rays were taken, they will be reviewed by a radiologist. You will be notified of any new findings that may affect your care.  Call 911  Seek emergency medica care if any of the following occur:    Trouble breathing    Confusion    Drowsiness or trouble awakening    Fainting or loss of consciousness    Rapid or very slow heart rate    Loss of bowel or bladder control  When to seek medical care  Get prompt medicat attention if any of the following occur:    Pain becomes worse or spreads to your legs    Weakness or numbness in one or both legs    Numbness in the groin or genital area    Unexplained fever over 100.4 F (38.0 C)    Burning or pain when passing urine    [id number] [employer] [facility]. [address] [place] [id number]. All rights reserved. This information is not intended as a substitute for  professional medical care. Always follow your healthcare professional's instructions.

## 2020-01-29 ENCOUNTER — HOSPITAL ENCOUNTER (EMERGENCY)
Facility: CLINIC | Age: 45
Discharge: HOME | End: 2020-01-29
Attending: NURSE PRACTITIONER | Admitting: NURSE PRACTITIONER
Payer: COMMERCIAL

## 2020-01-29 VITALS
SYSTOLIC BLOOD PRESSURE: 182 MMHG | WEIGHT: 312 LBS | DIASTOLIC BLOOD PRESSURE: 78 MMHG | BODY MASS INDEX: 38.79 KG/M2 | RESPIRATION RATE: 18 BRPM | TEMPERATURE: 98.9 F | OXYGEN SATURATION: 98 % | HEIGHT: 75 IN

## 2020-01-29 DIAGNOSIS — B34.9 VIRAL SYNDROME: ICD-10-CM

## 2020-01-29 LAB
FLUAV AG UPPER RESP QL IA.RAPID: NEGATIVE
FLUBV AG UPPER RESP QL IA.RAPID: NEGATIVE
INTERNAL QC OK POCT: YES

## 2020-01-29 PROCEDURE — G0463 HOSPITAL OUTPT CLINIC VISIT: HCPCS

## 2020-01-29 PROCEDURE — 99213 OFFICE O/P EST LOW 20 MIN: CPT | Mod: Z6 | Performed by: NURSE PRACTITIONER

## 2020-01-29 PROCEDURE — 87804 INFLUENZA ASSAY W/OPTIC: CPT | Performed by: NURSE PRACTITIONER

## 2020-01-29 ASSESSMENT — ENCOUNTER SYMPTOMS
TROUBLE SWALLOWING: 0
VOMITING: 0
DIZZINESS: 0
NUMBNESS: 0
SINUS PRESSURE: 0
SHORTNESS OF BREATH: 0
EYE REDNESS: 0
COUGH: 1
LIGHT-HEADEDNESS: 0
MYALGIAS: 1
EYE DISCHARGE: 0
ABDOMINAL PAIN: 0
HEADACHES: 1
NAUSEA: 0
FATIGUE: 0
SINUS PAIN: 0
SORE THROAT: 1
RHINORRHEA: 0
ARTHRALGIAS: 0
FEVER: 1
CHILLS: 1
JOINT SWELLING: 0
WEAKNESS: 0

## 2020-01-29 ASSESSMENT — MIFFLIN-ST. JEOR: SCORE: 2385.85

## 2020-01-29 NOTE — ED PROVIDER NOTES
History     Chief Complaint   Patient presents with     Influenza     HPI  Mark Conn is a 44 year old male who presents the urgent care for evaluation of flulike symptoms.  Symptoms include fever, chills, sore throat, headache, cough and myalgias since yesterday.  Has not taken any medication for this illness.  Denies dizziness, shortness of breath, difficulty breathing, abdominal pain.  Unknown sick contacts.  Did not receive influenza vaccine this season.    Allergies:  Allergies   Allergen Reactions     Ampicillin Rash     Codeine Rash     Erythromycin Rash     Keflex [Cephalexin Monohydrate] Rash     Penicillins Rash     Sulfa Drugs Rash       Problem List:    Patient Active Problem List    Diagnosis Date Noted     Obesity (BMI 35.0-39.9) with comorbidity (H) 10/02/2019     Priority: Medium     Moderate persistent asthma, unspecified whether complicated 04/26/2018     Priority: Medium     Hypoglycemia 04/26/2018     Priority: Medium     Restrictive lung disease 08/30/2017     Priority: Medium     See PFT August, 2017.        Chronic gout without tophus, unspecified cause, unspecified site 09/07/2016     Priority: Medium     Obesity 03/06/2015     Priority: Medium     Benign essential hypertension 03/23/2011     Priority: Medium     CARDIOVASCULAR SCREENING; LDL GOAL LESS THAN 130 10/31/2010     Priority: Medium     Hypertriglyceridemia 01/17/2010     Priority: Medium     249, Jan, 2010. 755 in Sept, 2012 and Lopid started.        Malabsorption of glucose 01/17/2010     Priority: Medium     108, Jan, 2010.   (Problem list name updated by automated process. Provider to review and confirm.)       Colitis 10/12/2009     Priority: Medium     Colonoscopy 10-12-09       Pain in limb 10/17/2006     Priority: Medium     ?lyme disease, initial screen interdeterminate, with negative WB, better on doxycycline  Repeating test in end of Nov          Past Medical History:    Past Medical History:   Diagnosis Date      Other acne        Past Surgical History:    Past Surgical History:   Procedure Laterality Date     SURGICAL HISTORY OF -       Appendectomy     SURGICAL HISTORY OF -   age 2    Hernia - Right Inguinal     SURGICAL HISTORY OF -   age 3    Pectoral-Sternum Repair      SURGICAL HISTORY OF -   08/12/85    Tonsillectomy       Family History:    Family History   Problem Relation Age of Onset     Hypertension Mother      Lipids Mother      Diabetes Mother      Thyroid Disease Father      Other Cancer Father 65        Gastric-spread to the bones, liver.     Diabetes Maternal Grandmother      Hypertension Maternal Grandfather      Diabetes Paternal Grandmother      Thyroid Disease Sister         Hyperthyroid.       Social History:  Marital Status:   [2]  Social History     Tobacco Use     Smoking status: Never Smoker     Smokeless tobacco: Never Used   Substance Use Topics     Alcohol use: Yes     Comment: Sociallly- not very often.     Drug use: No        Medications:    albuterol (PROAIR HFA/PROVENTIL HFA/VENTOLIN HFA) 108 (90 Base) MCG/ACT inhaler  allopurinol (ZYLOPRIM) 300 MG tablet  lisinopril (PRINIVIL/ZESTRIL) 10 MG tablet  metoprolol succinate ER (TOPROL-XL) 50 MG 24 hr tablet  montelukast (SINGULAIR) 10 MG tablet  multivitamin, therapeutic with minerals (MULTI-VITAMIN) TABS  naproxen (NAPROSYN) 500 MG tablet  Omega-3 Fatty Acids (OMEGA-3 FISH OIL PO)  tiZANidine (ZANAFLEX) 4 MG capsule          Review of Systems   Constitutional: Positive for chills and fever. Negative for fatigue.   HENT: Positive for sore throat. Negative for congestion, ear discharge, ear pain, rhinorrhea, sinus pressure, sinus pain and trouble swallowing.    Eyes: Negative for discharge and redness.   Respiratory: Positive for cough. Negative for shortness of breath.    Cardiovascular: Negative for chest pain.   Gastrointestinal: Negative for abdominal pain, nausea and vomiting.   Musculoskeletal: Positive for myalgias. Negative for  "arthralgias and joint swelling.   Skin: Negative for rash.   Neurological: Positive for headaches. Negative for dizziness, weakness, light-headedness and numbness.       Physical Exam   BP: (!) 182/78  Heart Rate: 92  Temp: 98.9  F (37.2  C)  Resp: 18  Height: 190.5 cm (6' 3\")  Weight: 141.5 kg (312 lb)  SpO2: 98 %      Physical Exam  Constitutional:       General: He is not in acute distress.     Appearance: He is well-developed. He is not diaphoretic.   HENT:      Right Ear: Tympanic membrane normal.      Left Ear: Tympanic membrane normal.      Nose: Nose normal.      Mouth/Throat:      Mouth: Mucous membranes are moist.      Pharynx: Oropharynx is clear. No oropharyngeal exudate or posterior oropharyngeal erythema.   Eyes:      Conjunctiva/sclera: Conjunctivae normal.      Pupils: Pupils are equal, round, and reactive to light.   Neck:      Musculoskeletal: Normal range of motion and neck supple.   Cardiovascular:      Rate and Rhythm: Regular rhythm. Tachycardia present.   Pulmonary:      Effort: Pulmonary effort is normal. No respiratory distress.      Breath sounds: Normal breath sounds and air entry. No decreased air movement. No decreased breath sounds, wheezing or rhonchi.   Abdominal:      General: There is no distension.      Palpations: Abdomen is soft.      Tenderness: There is no abdominal tenderness.   Musculoskeletal: Normal range of motion.   Skin:     General: Skin is warm.      Capillary Refill: Capillary refill takes less than 2 seconds.   Neurological:      Mental Status: He is alert and oriented to person, place, and time.         ED Course        Procedures    Results for orders placed or performed during the hospital encounter of 01/29/20 (from the past 24 hour(s))   Influenza A/B antigen POCT   Result Value Ref Range    Influenza A Negative neg    Influenza B Negative neg    Internal QC OK Yes        Medications - No data to display    Assessments & Plan (with Medical Decision Making) "   Patient is a 44-year-old male who presents the urgent care for evaluation of of influenza-like illness.  Influenza swab obtained with normal findings.  Patient relieved of findings as his wife is undergoing immunotherapy.  Discussed likely viral etiology and average course of viral illness.  Discussed prevention of the spread of infection. May use over the counter medications as needed and appropriate. Increase rest and hydration. Return precautions reviewed, all questions answered. Patient agreeable to plan of care and discharged in stable condition.    I have reviewed the nursing notes.    I have reviewed the findings, diagnosis, plan and need for follow up with the patient.    Discharge Medication List as of 1/29/2020  4:09 PM          Final diagnoses:   Viral syndrome       1/29/2020   [facility] EMERGENCY DEPARTMENT     Francisca Mensah, APRN CNP  01/29/20 0327

## 2020-01-29 NOTE — ED AVS SNAPSHOT
[facility] Emergency Department  [address] [address]  Phone:  [phone]  Fax:  [phone]                                    Mark Conn   MRN: [id number]    Department:  [facility] Emergency Department   Date of Visit:  1/29/2020           After Visit Summary Signature Page    I have received my discharge instructions, and my questions have been answered. I have discussed any challenges I see with this plan with the nurse or doctor.    ..........................................................................................................................................  Patient/Patient Representative Signature      ..........................................................................................................................................  Patient Representative Print Name and Relationship to Patient    ..................................................               ................................................  Date                                   Time    ..........................................................................................................................................  Reviewed by Signature/Title    ...................................................              ..............................................  Date                                               Time          22EPIC Rev 08/18

## 2020-02-13 ENCOUNTER — OFFICE VISIT (OUTPATIENT)
Dept: FAMILY MEDICINE | Facility: CLINIC | Age: 45
End: 2020-02-13
Payer: COMMERCIAL

## 2020-02-13 VITALS
HEART RATE: 73 BPM | TEMPERATURE: 97.1 F | WEIGHT: 307 LBS | RESPIRATION RATE: 16 BRPM | OXYGEN SATURATION: 96 % | BODY MASS INDEX: 38.17 KG/M2 | DIASTOLIC BLOOD PRESSURE: 84 MMHG | HEIGHT: 75 IN | SYSTOLIC BLOOD PRESSURE: 128 MMHG

## 2020-02-13 DIAGNOSIS — E16.2 HYPOGLYCEMIA: ICD-10-CM

## 2020-02-13 DIAGNOSIS — I10 BENIGN ESSENTIAL HYPERTENSION: ICD-10-CM

## 2020-02-13 DIAGNOSIS — J45.20 MILD INTERMITTENT ASTHMA WITHOUT COMPLICATION: ICD-10-CM

## 2020-02-13 DIAGNOSIS — J45.40 MODERATE PERSISTENT ASTHMA, UNSPECIFIED WHETHER COMPLICATED: ICD-10-CM

## 2020-02-13 DIAGNOSIS — M1A.9XX0 CHRONIC GOUT WITHOUT TOPHUS, UNSPECIFIED CAUSE, UNSPECIFIED SITE: Primary | ICD-10-CM

## 2020-02-13 DIAGNOSIS — E78.1 HYPERTRIGLYCERIDEMIA: ICD-10-CM

## 2020-02-13 LAB
CHOLEST SERPL-MCNC: 172 MG/DL
CREAT SERPL-MCNC: 0.94 MG/DL (ref 0.66–1.25)
GFR SERPL CREATININE-BSD FRML MDRD: >90 ML/MIN/{1.73_M2}
GLUCOSE SERPL-MCNC: 111 MG/DL (ref 70–99)
HDLC SERPL-MCNC: 42 MG/DL
LDLC SERPL CALC-MCNC: 85 MG/DL
NONHDLC SERPL-MCNC: 130 MG/DL
POTASSIUM SERPL-SCNC: 4.5 MMOL/L (ref 3.4–5.3)
TRIGL SERPL-MCNC: 223 MG/DL
URATE SERPL-MCNC: 5.5 MG/DL (ref 3.5–7.2)

## 2020-02-13 PROCEDURE — 36415 COLL VENOUS BLD VENIPUNCTURE: CPT | Performed by: FAMILY MEDICINE

## 2020-02-13 PROCEDURE — 82565 ASSAY OF CREATININE: CPT | Performed by: FAMILY MEDICINE

## 2020-02-13 PROCEDURE — 99214 OFFICE O/P EST MOD 30 MIN: CPT | Performed by: FAMILY MEDICINE

## 2020-02-13 PROCEDURE — 84550 ASSAY OF BLOOD/URIC ACID: CPT | Performed by: FAMILY MEDICINE

## 2020-02-13 PROCEDURE — 84132 ASSAY OF SERUM POTASSIUM: CPT | Performed by: FAMILY MEDICINE

## 2020-02-13 PROCEDURE — 80061 LIPID PANEL: CPT | Performed by: FAMILY MEDICINE

## 2020-02-13 PROCEDURE — 82947 ASSAY GLUCOSE BLOOD QUANT: CPT | Performed by: FAMILY MEDICINE

## 2020-02-13 RX ORDER — LISINOPRIL 10 MG/1
10 TABLET ORAL DAILY
Qty: 90 TABLET | Refills: 3 | Status: SHIPPED | OUTPATIENT
Start: 2020-02-13 | End: 2021-02-12

## 2020-02-13 RX ORDER — ALBUTEROL SULFATE 90 UG/1
2 AEROSOL, METERED RESPIRATORY (INHALATION) EVERY 6 HOURS PRN
Qty: 1 INHALER | Refills: 11 | Status: SHIPPED | OUTPATIENT
Start: 2020-02-13 | End: 2021-02-12

## 2020-02-13 RX ORDER — MONTELUKAST SODIUM 10 MG/1
10 TABLET ORAL AT BEDTIME
Qty: 90 TABLET | Refills: 3 | Status: SHIPPED | OUTPATIENT
Start: 2020-02-13 | End: 2021-02-12

## 2020-02-13 RX ORDER — ALLOPURINOL 300 MG/1
1 TABLET ORAL DAILY
Qty: 90 TABLET | Refills: 3 | Status: SHIPPED | OUTPATIENT
Start: 2020-02-13 | End: 2021-02-12

## 2020-02-13 RX ORDER — METOPROLOL SUCCINATE 50 MG/1
50 TABLET, EXTENDED RELEASE ORAL DAILY
Qty: 90 TABLET | Refills: 3 | Status: SHIPPED | OUTPATIENT
Start: 2020-02-13 | End: 2021-02-12

## 2020-02-13 ASSESSMENT — MIFFLIN-ST. JEOR: SCORE: 2363.17

## 2020-02-13 NOTE — PROGRESS NOTES
Subjective     Mark Conn is a 44 year old male who presents to clinic today for the following health issues:    HPI   Hypertension Follow-up      Do you check your blood pressure regularly outside of the clinic? Yes, at home.    Are you following a low salt diet? Yes    Are your blood pressures ever more than 140 on the top number (systolic) OR more   than 90 on the bottom number (diastolic), for example 140/90? No,  130/76 for his average reading.    Asthma Follow-Up    Was ACT completed today?    Yes    ACT Total Scores 2/13/2020   ACT TOTAL SCORE (Goal Greater than or Equal to 20) 21   In the past 12 months, how many times did you visit the emergency room for your asthma without being admitted to the hospital? 0   In the past 12 months, how many times were you hospitalized overnight because of your asthma? 0       How many days per week do you miss taking your asthma controller medication?  I do not have an asthma controller medication    Please describe any recent triggers for your asthma: cold air    Have you had any Emergency Room Visits, Urgent Care Visits, or Hospital Admissions since your last office visit?  No      How many servings of fruits and vegetables do you eat daily?  2-3    On average, how many sweetened beverages do you drink each day (Examples: soda, juice, sweet tea, etc.  Do NOT count diet or artificially sweetened beverages)?   32 oz for an average when at work.    How many days per week do you exercise enough to make your heart beat faster? 1 day    How many minutes a day do you exercise enough to make your heart beat faster? 30 - 60    How many days per week do you miss taking your medication? 0      Current Outpatient Medications:      albuterol (PROAIR HFA/PROVENTIL HFA/VENTOLIN HFA) 108 (90 Base) MCG/ACT inhaler, Inhale 2 puffs into the lungs every 6 hours as needed for shortness of breath / dyspnea or wheezing, Disp: 1 Inhaler, Rfl: 11     allopurinol (ZYLOPRIM) 300 MG tablet,  "TAKE ONE TABLET BY MOUTH EVERY DAY, Disp: 30 tablet, Rfl: 11     lisinopril (PRINIVIL/ZESTRIL) 10 MG tablet, Take 1 tablet (10 mg) by mouth daily, Disp: 90 tablet, Rfl: 3     metoprolol succinate ER (TOPROL-XL) 50 MG 24 hr tablet, Take 1 tablet (50 mg) by mouth daily, Disp: 90 tablet, Rfl: 3     montelukast (SINGULAIR) 10 MG tablet, Take 1 tablet (10 mg) by mouth At Bedtime, Disp: 90 tablet, Rfl: 3     multivitamin, therapeutic with minerals (MULTI-VITAMIN) TABS, Take 1 tablet by mouth 2 times daily., Disp: , Rfl:      Omega-3 Fatty Acids (OMEGA-3 FISH OIL PO), , Disp: , Rfl:   Patient Active Problem List   Diagnosis     Pain in limb     Colitis     Hypertriglyceridemia     Malabsorption of glucose     CARDIOVASCULAR SCREENING; LDL GOAL LESS THAN 130     Benign essential hypertension     Obesity     Chronic gout without tophus, unspecified cause, unspecified site     Restrictive lung disease     Moderate persistent asthma, unspecified whether complicated     Hypoglycemia     Obesity (BMI 35.0-39.9) with comorbidity (H)     Blood pressure 128/84, pulse 73, temperature 97.1  F (36.2  C), temperature source Tympanic, resp. rate 16, height 1.905 m (6' 3\"), weight 139.3 kg (307 lb), SpO2 96 %.    Exam:  GENERAL APPEARANCE: healthy, alert and no distress  EYES: EOMI,  PERRL  NECK: no adenopathy, no asymmetry, masses, or scars and thyroid normal to palpation  RESP: lungs clear to auscultation - no rales, rhonchi or wheezes  CV: regular rates and rhythm, normal S1 S2, no S3 or S4 and no murmur, click or rub -  MS: extremities normal- no gross deformities noted, no evidence of inflammation in joints, FROM in all extremities.  SKIN: no suspicious lesions or rashes  PSYCH: mentation appears normal and affect normal/bright      (M1A.9XX0) Chronic gout without tophus, unspecified cause, unspecified site  (primary encounter diagnosis)  Comment:   Plan: Uric acid, allopurinol (ZYLOPRIM) 300 MG tablet        Use the lower protein " diet and stay well hydrated. Do the one lab today and we will call the results.   Refills are done for one year.     (E78.1) Hypertriglyceridemia  Comment:   Plan: Lipid panel reflex to direct LDL Fasting        Use the lower chol diet and daily exercise. Do the fasting lipids and we will call the results.     (E16.2) Hypoglycemia  Comment:   Plan: Glucose        Do the glucose today and the range is between  fasting. We will call this.     (I10) Benign essential hypertension  Comment:   Plan: Creatinine, Potassium, lisinopril         (PRINIVIL/ZESTRIL) 10 MG tablet, metoprolol         succinate ER (TOPROL-XL) 50 MG 24 hr tablet        Monitor and record the BP at rest and the goal for the average is under 130/80.   Use the non drug therapies. If doing well then refill and recheck annually.     (J45.20) Mild intermittent asthma without complication  Comment:   Plan: albuterol (PROAIR HFA/PROVENTIL HFA/VENTOLIN         HFA) 108 (90 Base) MCG/ACT inhaler        Use the inhaler as needed and refills are done. Do the flu shot annually in the fall.   Use good hygiene. The Singulair is also refilled.       Henry Herrera MD

## 2020-02-13 NOTE — LETTER
My Asthma Action Plan    Name: Mark Conn   YOB: 1975  Date: 2/13/2020   My doctor: Henry Herrera MD   My clinic: [facility]        My Rescue Medicine:   Albuterol inhaler (Proair/Ventolin/Proventil HFA)  2-4 puffs EVERY 4 HOURS as needed. Use a spacer if recommended by your provider.  Singulair 10 mg at bedtime. My Asthma Severity:   Intermittent / Exercise Induced  Know your asthma triggers: cold air             GREEN ZONE   Good Control    I feel good    No cough or wheeze    Can work, sleep and play without asthma symptoms       Take your asthma control medicine every day.     1. If exercise triggers your asthma, take your rescue medication    15 minutes before exercise or sports, and    During exercise if you have asthma symptoms  2. Spacer to use with inhaler: If you have a spacer, make sure to use it with your inhaler             YELLOW ZONE Getting Worse  I have ANY of these:    I do not feel good    Cough or wheeze    Chest feels tight    Wake up at night   1. Keep taking your Green Zone medications  2. Start taking your rescue medicine:    every 20 minutes for up to 1 hour. Then every 4 hours for 24-48 hours.  3. If you stay in the Yellow Zone for more than 12-24 hours, contact your doctor.  4. If you do not return to the Green Zone in 12-24 hours or you get worse, start taking your oral steroid medicine if prescribed by your provider.           RED ZONE Medical Alert - Get Help  I have ANY of these:    I feel awful    Medicine is not helping    Breathing getting harder    Trouble walking or talking    Nose opens wide to breathe       1. Take your rescue medicine NOW  2. If your provider has prescribed an oral steroid medicine, start taking it NOW  3. Call your doctor NOW  4. If you are still in the Red Zone after 20 minutes and you have not reached your doctor:    Take your rescue medicine again and    Call 911 or go to the emergency room right away    See your  regular doctor within 2 weeks of an Emergency Room or Urgent Care visit for follow-up treatment.          Annual Reminders:  Meet with Asthma Educator,  Flu Shot in the Fall, consider Pneumonia Vaccination for patients with asthma (aged 19 and older).    Pharmacy:    [id number] - [facility] [address] AT [address], [place] [address] [facility], [place] - [address] [place] [address]    Electronically signed by Henry Herrera MD   Date: 02/13/20                    Asthma Triggers  How To Control Things That Make Your Asthma Worse    Triggers are things that make your asthma worse.  Look at the list below to help you find your triggers and   what you can do about them. You can help prevent asthma flare-ups by staying away from your triggers.      Trigger                                                          What you can do   Cigarette Smoke  Tobacco smoke can make asthma worse. Do not allow smoking in your home, car or around you.  Be sure no one smokes at a child s day care or school.  If you smoke, ask your health care provider for ways to help you quit.  Ask family members to quit too.  Ask your health care provider for a referral to Quit Plan to help you quit smoking, or call 4-486-683-PLAN.     Colds, Flu, Bronchitis  These are common triggers of asthma. Wash your hands often.  Don t touch your eyes, nose or mouth.  Get a flu shot every year.     Dust Mites  These are tiny bugs that live in cloth or carpet. They are too small to see. Wash sheets and blankets in hot water every week.   Encase pillows and mattress in dust mite proof covers.  Avoid having carpet if you can. If you have carpet, vacuum weekly.   Use a dust mask and HEPA vacuum.   Pollen and Outdoor Mold  Some people are allergic to trees, grass, or weed pollen, or molds. Try to keep your windows closed.  Limit time out doors when pollen  count is high.   Ask you health care provider about taking medicine during allergy season.     Animal Dander  Some people are allergic to skin flakes, urine or saliva from pets with fur or feathers. Keep pets with fur or feathers out of your home.    If you can t keep the pet outdoors, then keep the pet out of your bedroom.  Keep the bedroom door closed.  Keep pets off cloth furniture and away from stuffed toys.     Mice, Rats, and Cockroaches  Some people are allergic to the waste from these pests.   Cover food and garbage.  Clean up spills and food crumbs.  Store grease in the refrigerator.   Keep food out of the bedroom.   Indoor Mold  This can be a trigger if your home has high moisture. Fix leaking faucets, pipes, or other sources of water.   Clean moldy surfaces.  Dehumidify basement if it is damp and smelly.   Smoke, Strong Odors, and Sprays  These can reduce air quality. Stay away from strong odors and sprays, such as perfume, powder, hair spray, paints, smoke incense, paint, cleaning products, candles and new carpet.   Exercise or Sports  Some people with asthma have this trigger. Be active!  Ask your doctor about taking medicine before sports or exercise to prevent symptoms.    Warm up for 5-10 minutes before and after sports or exercise.     Other Triggers of Asthma  Cold air:  Cover your nose and mouth with a scarf.  Sometimes laughing or crying can be a trigger.  Some medicines and food can trigger asthma.

## 2020-02-13 NOTE — PATIENT INSTRUCTIONS
Thank you for choosing [facility].  You may be receiving an email and/or telephone survey request from [facility] Customer Experience regarding your visit today.  Please take a few minutes to respond to the survey to let us know how we are doing.      If you have questions or concerns, please contact us via [employer] or you can contact your care team at [phone].    Our Clinic hours are:  Monday 6:40 am  to 7:00 pm  Tuesday -Friday 6:40 am to 5:00 pm    The [place] outpatient lab hours are:  Monday - Friday 6:10 am to 4:45 pm  Saturdays 7:00 am to 11:00 am  Appointments are required, call [phone]    If you have clinical questions after hours or would like to schedule an appointment,  call the clinic at [phone].    (M1A.9XX0) Chronic gout without tophus, unspecified cause, unspecified site  (primary encounter diagnosis)  Comment:   Plan: Uric acid, allopurinol (ZYLOPRIM) 300 MG tablet        Use the lower protein diet and stay well hydrated. Do the one lab today and we will call the results.   Refills are done for one year.     (E78.1) Hypertriglyceridemia  Comment:   Plan: Lipid panel reflex to direct LDL Fasting        Use the lower chol diet and daily exercise. Do the fasting lipids and we will call the results.     (E16.2) Hypoglycemia  Comment:   Plan: Glucose        Do the glucose today and the range is between  fasting. We will call this.     (I10) Benign essential hypertension  Comment:   Plan: Creatinine, Potassium, lisinopril         (PRINIVIL/ZESTRIL) 10 MG tablet, metoprolol         succinate ER (TOPROL-XL) 50 MG 24 hr tablet        Monitor and record the BP at rest and the goal for the average is under 130/80.   Use the non drug therapies. If doing well then refill and recheck annually.     (J45.20) Mild intermittent asthma without complication  Comment:   Plan: albuterol (PROAIR HFA/PROVENTIL HFA/VENTOLIN         HFA) 108 (90 Base) MCG/ACT inhaler        Use the inhaler as  needed and refills are done. Do the flu shot annually in the fall.   Use good hygiene. The Singulair is also refilled.

## 2020-02-14 ASSESSMENT — ASTHMA QUESTIONNAIRES: ACT_TOTALSCORE: 21

## 2020-03-23 ENCOUNTER — TELEPHONE (OUTPATIENT)
Dept: FAMILY MEDICINE | Facility: CLINIC | Age: 45
End: 2020-03-23

## 2020-03-30 ENCOUNTER — MYC MEDICAL ADVICE (OUTPATIENT)
Dept: FAMILY MEDICINE | Facility: CLINIC | Age: 45
End: 2020-03-30

## 2020-03-31 ENCOUNTER — MYC MEDICAL ADVICE (OUTPATIENT)
Dept: FAMILY MEDICINE | Facility: CLINIC | Age: 45
End: 2020-03-31

## 2020-03-31 NOTE — TELEPHONE ENCOUNTER
This form was done and is scanned under media tab.   Advised patient via [employer].   Lisa CARRERA

## 2020-11-03 ENCOUNTER — IMMUNIZATION (OUTPATIENT)
Dept: FAMILY MEDICINE | Facility: CLINIC | Age: 45
End: 2020-11-03
Payer: COMMERCIAL

## 2020-11-03 PROCEDURE — 90686 IIV4 VACC NO PRSV 0.5 ML IM: CPT

## 2020-11-03 PROCEDURE — 90471 IMMUNIZATION ADMIN: CPT

## 2020-11-14 ENCOUNTER — HEALTH MAINTENANCE LETTER (OUTPATIENT)
Age: 45
End: 2020-11-14

## 2021-02-05 DIAGNOSIS — M1A.9XX0 CHRONIC GOUT WITHOUT TOPHUS, UNSPECIFIED CAUSE, UNSPECIFIED SITE: ICD-10-CM

## 2021-02-05 DIAGNOSIS — I10 BENIGN ESSENTIAL HYPERTENSION: ICD-10-CM

## 2021-02-05 DIAGNOSIS — J45.40 MODERATE PERSISTENT ASTHMA, UNSPECIFIED WHETHER COMPLICATED: ICD-10-CM

## 2021-02-08 NOTE — TELEPHONE ENCOUNTER
"Requested Prescriptions   Pending Prescriptions Disp Refills     allopurinol (ZYLOPRIM) 300 MG tablet [Pharmacy Med Name: ALLOPURINOL 300MG TABS] 90 tablet 3     Sig: TAKE ONE TABLET BY MOUTH ONCE DAILY       Gout Agents Protocol Failed - 2/5/2021  8:24 PM        Failed - CBC on file in past 12 months     Recent Labs   Lab Test 08/17/17  1155 11/30/14  1942   WBC  --  5.3   RBC  --  5.57   HGB 16.5 16.4   HCT  --  47.3   PLT  --  166                 Failed - ALT on file in past 12 months     Recent Labs   Lab Test 02/19/13  1450   ALT 46             Passed - Has Uric Acid on file in past 12 months and value is less than 6     Recent Labs   Lab Test 02/13/20  0817   URIC 5.5     If level is 6mg/dL or greater, ok to refill one time and refer to provider.           Passed - Recent (12 mo) or future (30 days) visit within the authorizing provider's specialty     Patient has had an office visit with the authorizing provider or a provider within the authorizing providers department within the previous 12 mos or has a future within next 30 days. See \"Patient Info\" tab in inbasket, or \"Choose Columns\" in Meds & Orders section of the refill encounter.              Passed - Medication is active on med list        Passed - Patient is age 18 or older        Passed - Normal serum creatinine on file in the past 12 months     Recent Labs   Lab Test 02/13/20  0817   CR 0.94       Ok to refill medication if creatinine is low             lisinopril (ZESTRIL) 10 MG tablet [Pharmacy Med Name: LISINOPRIL 10MG TABS] 90 tablet 3     Sig: TAKE ONE TABLET BY MOUTH ONCE DAILY       ACE Inhibitors (Including Combos) Protocol Passed - 2/5/2021  8:24 PM        Passed - Blood pressure under 140/90 in past 12 months     BP Readings from Last 3 Encounters:   02/13/20 128/84   01/29/20 (!) 182/78   01/21/20 122/86                 Passed - Recent (12 mo) or future (30 days) visit within the authorizing provider's specialty     Patient has had an " "office visit with the authorizing provider or a provider within the authorizing providers department within the previous 12 mos or has a future within next 30 days. See \"Patient Info\" tab in inbasket, or \"Choose Columns\" in Meds & Orders section of the refill encounter.              Passed - Medication is active on med list        Passed - Patient is age 18 or older        Passed - Normal serum creatinine on file in past 12 months     Recent Labs   Lab Test 02/13/20  0817   CR 0.94       Ok to refill medication if creatinine is low          Passed - Normal serum potassium on file in past 12 months     Recent Labs   Lab Test 02/13/20  0817   POTASSIUM 4.5                metoprolol succinate ER (TOPROL-XL) 50 MG 24 hr tablet [Pharmacy Med Name: METOPROLOL SUCCINATE ER 50MG TB24] 90 tablet 3     Sig: TAKE ONE TABLET BY MOUTH ONCE DAILY       Beta-Blockers Protocol Passed - 2/5/2021  8:24 PM        Passed - Blood pressure under 140/90 in past 12 months     BP Readings from Last 3 Encounters:   02/13/20 128/84   01/29/20 (!) 182/78   01/21/20 122/86                 Passed - Patient is age 6 or older        Passed - Recent (12 mo) or future (30 days) visit within the authorizing provider's specialty     Patient has had an office visit with the authorizing provider or a provider within the authorizing providers department within the previous 12 mos or has a future within next 30 days. See \"Patient Info\" tab in inbasket, or \"Choose Columns\" in Meds & Orders section of the refill encounter.              Passed - Medication is active on med list           montelukast (SINGULAIR) 10 MG tablet [Pharmacy Med Name: MONTELUKAST SODIUM 10MG TABS] 90 tablet 3     Sig: TAKE ONE TABLET BY MOUTH AT BEDTIME       Leukotriene Inhibitors Protocol Failed - 2/5/2021  8:24 PM        Failed - Asthma control assessment score within normal limits in last 6 months     Please review ACT score.           Failed - Recent (6 mo) or future (30 days) " "visit within the authorizing provider's specialty     Patient had office visit in the last 6 months or has a visit in the next 30 days with authorizing provider or within the authorizing provider's specialty.  See \"Patient Info\" tab in inbasket, or \"Choose Columns\" in Meds & Orders section of the refill encounter.            Passed - Patient is age 12 or older     If patient is under 16, ok to refill using age based dosing.           Passed - Medication is active on med list             "

## 2021-02-09 ENCOUNTER — MYC MEDICAL ADVICE (OUTPATIENT)
Dept: FAMILY MEDICINE | Facility: CLINIC | Age: 46
End: 2021-02-09

## 2021-02-10 RX ORDER — LISINOPRIL 10 MG/1
TABLET ORAL
Qty: 90 TABLET | Refills: 3 | OUTPATIENT
Start: 2021-02-10

## 2021-02-10 RX ORDER — ALLOPURINOL 300 MG/1
TABLET ORAL
Qty: 90 TABLET | Refills: 3 | OUTPATIENT
Start: 2021-02-10

## 2021-02-10 RX ORDER — METOPROLOL SUCCINATE 50 MG/1
TABLET, EXTENDED RELEASE ORAL
Qty: 90 TABLET | Refills: 3 | OUTPATIENT
Start: 2021-02-10

## 2021-02-10 RX ORDER — MONTELUKAST SODIUM 10 MG/1
TABLET ORAL
Qty: 90 TABLET | Refills: 3 | OUTPATIENT
Start: 2021-02-10

## 2021-02-12 ENCOUNTER — OFFICE VISIT (OUTPATIENT)
Dept: FAMILY MEDICINE | Facility: CLINIC | Age: 46
End: 2021-02-12
Payer: COMMERCIAL

## 2021-02-12 VITALS
HEIGHT: 75 IN | WEIGHT: 315 LBS | TEMPERATURE: 98 F | BODY MASS INDEX: 39.17 KG/M2 | SYSTOLIC BLOOD PRESSURE: 128 MMHG | DIASTOLIC BLOOD PRESSURE: 88 MMHG | HEART RATE: 75 BPM | OXYGEN SATURATION: 96 %

## 2021-02-12 DIAGNOSIS — E78.1 HYPERTRIGLYCERIDEMIA: ICD-10-CM

## 2021-02-12 DIAGNOSIS — J45.40 MODERATE PERSISTENT ASTHMA, UNSPECIFIED WHETHER COMPLICATED: ICD-10-CM

## 2021-02-12 DIAGNOSIS — M1A.9XX0 CHRONIC GOUT WITHOUT TOPHUS, UNSPECIFIED CAUSE, UNSPECIFIED SITE: ICD-10-CM

## 2021-02-12 DIAGNOSIS — I10 BENIGN ESSENTIAL HYPERTENSION: ICD-10-CM

## 2021-02-12 DIAGNOSIS — E66.812 CLASS 2 OBESITY WITHOUT SERIOUS COMORBIDITY WITH BODY MASS INDEX (BMI) OF 38.0 TO 38.9 IN ADULT, UNSPECIFIED OBESITY TYPE: Primary | ICD-10-CM

## 2021-02-12 DIAGNOSIS — J45.20 MILD INTERMITTENT ASTHMA WITHOUT COMPLICATION: ICD-10-CM

## 2021-02-12 LAB
ALT SERPL W P-5'-P-CCNC: 41 U/L (ref 0–70)
CHOLEST SERPL-MCNC: 180 MG/DL
HDLC SERPL-MCNC: 36 MG/DL
LDLC SERPL CALC-MCNC: ABNORMAL MG/DL
LDLC SERPL DIRECT ASSAY-MCNC: 96 MG/DL
NONHDLC SERPL-MCNC: 144 MG/DL
T4 FREE SERPL-MCNC: 0.87 NG/DL (ref 0.76–1.46)
TRIGL SERPL-MCNC: 458 MG/DL
TSH SERPL DL<=0.005 MIU/L-ACNC: 1.97 MU/L (ref 0.4–4)
URATE SERPL-MCNC: 5.6 MG/DL (ref 3.5–7.2)

## 2021-02-12 PROCEDURE — 36415 COLL VENOUS BLD VENIPUNCTURE: CPT | Performed by: FAMILY MEDICINE

## 2021-02-12 PROCEDURE — 84443 ASSAY THYROID STIM HORMONE: CPT | Performed by: FAMILY MEDICINE

## 2021-02-12 PROCEDURE — 84439 ASSAY OF FREE THYROXINE: CPT | Performed by: FAMILY MEDICINE

## 2021-02-12 PROCEDURE — 99214 OFFICE O/P EST MOD 30 MIN: CPT | Performed by: FAMILY MEDICINE

## 2021-02-12 PROCEDURE — 84550 ASSAY OF BLOOD/URIC ACID: CPT | Performed by: FAMILY MEDICINE

## 2021-02-12 PROCEDURE — 83721 ASSAY OF BLOOD LIPOPROTEIN: CPT | Mod: 59 | Performed by: FAMILY MEDICINE

## 2021-02-12 PROCEDURE — 80061 LIPID PANEL: CPT | Performed by: FAMILY MEDICINE

## 2021-02-12 PROCEDURE — 84460 ALANINE AMINO (ALT) (SGPT): CPT | Performed by: FAMILY MEDICINE

## 2021-02-12 RX ORDER — ALBUTEROL SULFATE 90 UG/1
2 AEROSOL, METERED RESPIRATORY (INHALATION) EVERY 6 HOURS PRN
Qty: 1 INHALER | Refills: 11 | Status: SHIPPED | OUTPATIENT
Start: 2021-02-12 | End: 2022-02-07

## 2021-02-12 RX ORDER — LISINOPRIL 10 MG/1
10 TABLET ORAL DAILY
Qty: 90 TABLET | Refills: 3 | Status: SHIPPED | OUTPATIENT
Start: 2021-02-12 | End: 2021-12-21

## 2021-02-12 RX ORDER — MONTELUKAST SODIUM 10 MG/1
10 TABLET ORAL AT BEDTIME
Qty: 90 TABLET | Refills: 3 | Status: SHIPPED | OUTPATIENT
Start: 2021-02-12 | End: 2022-02-07

## 2021-02-12 RX ORDER — ALLOPURINOL 300 MG/1
1 TABLET ORAL DAILY
Qty: 90 TABLET | Refills: 3 | Status: SHIPPED | OUTPATIENT
Start: 2021-02-12 | End: 2022-02-07

## 2021-02-12 RX ORDER — GEMFIBROZIL 600 MG/1
600 TABLET, FILM COATED ORAL
Qty: 60 TABLET | Refills: 11 | Status: SHIPPED | OUTPATIENT
Start: 2021-02-12 | End: 2022-02-07

## 2021-02-12 RX ORDER — METOPROLOL SUCCINATE 50 MG/1
50 TABLET, EXTENDED RELEASE ORAL DAILY
Qty: 90 TABLET | Refills: 3 | Status: SHIPPED | OUTPATIENT
Start: 2021-02-12 | End: 2022-02-07

## 2021-02-12 ASSESSMENT — MIFFLIN-ST. JEOR: SCORE: 2399.56

## 2021-02-12 NOTE — PATIENT INSTRUCTIONS
(E66.9,  Z68.38) Class 2 obesity without serious comorbidity with body mass index (BMI) of 38.0 to 38.9 in adult, unspecified obesity type  (primary encounter diagnosis)  Comment:   Plan: NUTRITION REFERRAL, TSH, T4 FREE        We discussed portion control and avoid the higher calorie food. Exercise daily and consider a .     (E78.1) Hypertriglyceridemia  Comment:   Plan: ALT, Lipid panel reflex to direct LDL Fasting        Use the lower chol diet, exercise daily and do the fasting lipids  Today.   We will call the results.      (M1A.9XX0) Chronic gout without tophus, unspecified cause, unspecified site  Comment:   Plan: Uric acid, allopurinol (ZYLOPRIM) 300 MG tablet        Use the lower protein diet and stay on the med. Refills done. Do the uric acid today.     (J45.20) Mild intermittent asthma without complication  Comment:   Plan: albuterol (PROAIR HFA/PROVENTIL HFA/VENTOLIN         HFA) 108 (90 Base) MCG/ACT inhaler        Use this as needed and refills are done. Identify triggers to avoid.     (I10) Benign essential hypertension  Comment:   Plan: lisinopril (ZESTRIL) 10 MG tablet, metoprolol         succinate ER (TOPROL-XL) 50 MG 24 hr tablet        Monitor and record the BP at rest and the goal for the average is under 130/80.   Use the non drug therapies. Refills are done.

## 2021-02-12 NOTE — PROGRESS NOTES
Ana Flores is a 45 year old who presents for the following health issues  accompanied by himself:    HPI       Hypertension Follow-up      Do you check your blood pressure regularly outside of the clinic? No     Are you following a low salt diet? Yes    Are your blood pressures ever more than 140 on the top number (systolic) OR more   than 90 on the bottom number (diastolic), for example 140/90? Not checking lately.    Asthma Follow-Up    Was ACT completed today?    Yes    ACT Total Scores 2/12/2021   ACT TOTAL SCORE (Goal Greater than or Equal to 20) 22   In the past 12 months, how many times did you visit the emergency room for your asthma without being admitted to the hospital? 0   In the past 12 months, how many times were you hospitalized overnight because of your asthma? 0          How many days per week do you miss taking your asthma controller medication?  I do not have an asthma controller medication    Please describe any recent triggers for your asthma: cold air    Have you had any Emergency Room Visits, Urgent Care Visits, or Hospital Admissions since your last office visit?  No      How many servings of fruits and vegetables do you eat daily?  2-3    On average, how many sweetened beverages do you drink each day (Examples: soda, juice, sweet tea, etc.  Do NOT count diet or artificially sweetened beverages)?   Depends on the day-when at work, 2 bottles of Mt. Dew a day.  Water and Natanael-aid at home.    How many days per week do you exercise enough to make your heart beat faster? None currently.    How many minutes a day do you exercise enough to make your heart beat faster? See above.  He had to put his dog down and that is who he would walk with.    How many days per week do you miss taking your medication? 0    Medication Followup of Gout    Taking Medication as prescribed: yes    Side Effects:  None    Medication Helping Symptoms:  Yes, no gout flares since being on the medication.   "    WEIGHT PROBLEM: He states he has issues with tyring to lose weight since the age of 34.  Family history of thyroid issues for his father and sister.  Patient has had previous testing done that has been normal.     Current Outpatient Medications   Medication Instructions     albuterol (PROAIR HFA/PROVENTIL HFA/VENTOLIN HFA) 108 (90 Base) MCG/ACT inhaler 2 puffs, Inhalation, EVERY 6 HOURS PRN     allopurinol (ZYLOPRIM) 300 mg, Oral, DAILY     lisinopril (ZESTRIL) 10 mg, Oral, DAILY     metoprolol succinate ER (TOPROL-XL) 50 mg, Oral, DAILY     montelukast (SINGULAIR) 10 mg, Oral, AT BEDTIME     multivitamin, therapeutic with minerals (MULTI-VITAMIN) TABS 1 tablet, 2 TIMES DAILY     Omega-3 Fatty Acids (OMEGA-3 FISH OIL PO) Oral     Patient Active Problem List   Diagnosis     Pain in limb     Colitis     Hypertriglyceridemia     Malabsorption of glucose     CARDIOVASCULAR SCREENING; LDL GOAL LESS THAN 130     Benign essential hypertension     Obesity     Chronic gout without tophus, unspecified cause, unspecified site     Restrictive lung disease     Moderate persistent asthma, unspecified whether complicated     Hypoglycemia     Obesity (BMI 35.0-39.9) with comorbidity (H)       Blood pressure (!) 128/92, pulse 75, temperature 98  F (36.7  C), temperature source Tympanic, height 1.899 m (6' 2.75\"), weight 143.8 kg (317 lb), SpO2 96 %.    Exam:  GENERAL APPEARANCE: healthy, alert and no distress  GENERAL APPEARANCE: over weight  NECK: no adenopathy, no asymmetry, masses, or scars and thyroid normal to palpation  RESP: lungs clear to auscultation - no rales, rhonchi or wheezes  CV: regular rates and rhythm, normal S1 S2, no S3 or S4 and no murmur, click or rub -  MS: extremities normal- no gross deformities noted, no evidence of inflammation in joints, FROM in all extremities.  SKIN: no suspicious lesions or rashes  PSYCH: mentation appears normal and affect normal/bright    (E66.9,  Z68.38) Class 2 obesity without " serious comorbidity with body mass index (BMI) of 38.0 to 38.9 in adult, unspecified obesity type  (primary encounter diagnosis)  Comment:   Plan: NUTRITION REFERRAL, TSH, T4 FREE        We discussed portion control and avoid the higher calorie food. Exercise daily and consider a .     (E78.1) Hypertriglyceridemia  Comment:   Plan: ALT, Lipid panel reflex to direct LDL Fasting        Use the lower chol diet, exercise daily and do the fasting lipids  Today.   We will call the results.      (M1A.9XX0) Chronic gout without tophus, unspecified cause, unspecified site  Comment:   Plan: Uric acid, allopurinol (ZYLOPRIM) 300 MG tablet        Use the lower protein diet and stay on the med. Refills done. Do the uric acid today.     (J45.20) Mild intermittent asthma without complication  Comment:   Plan: albuterol (PROAIR HFA/PROVENTIL HFA/VENTOLIN         HFA) 108 (90 Base) MCG/ACT inhaler        Use this as needed and refills are done. Identify triggers to avoid.     (I10) Benign essential hypertension  Comment:   Plan: lisinopril (ZESTRIL) 10 MG tablet, metoprolol         succinate ER (TOPROL-XL) 50 MG 24 hr tablet        Monitor and record the BP at rest and the goal for the average is under 130/80.   Use the non drug therapies. Refills are done.         Henry Herrera MD

## 2021-02-13 ASSESSMENT — ASTHMA QUESTIONNAIRES: ACT_TOTALSCORE: 22

## 2021-04-05 ENCOUNTER — MYC MEDICAL ADVICE (OUTPATIENT)
Dept: FAMILY MEDICINE | Facility: CLINIC | Age: 46
End: 2021-04-05

## 2021-04-05 NOTE — LETTER
April 6, 2021      Mark Conn  [address] [address] [place] [id number]        To Whom It May Concern,      Patient has a history of supraventricular tachycardia (SVT) managed well with medication.  His condition is stable, and he has been asymptomatic for many years.  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           Sincerely,    Dr. Kasia Moreno, DO  [facility]    Covering for his primary care physician Dr. Herrera

## 2021-04-06 NOTE — TELEPHONE ENCOUNTER
"Forwarding request for letter addendum to PCP and providers in his absence as high priority, as patient needs this to continue working.  Letter pended for consideration.     Reached out to patient via phone to discuss. [facility], and there was a form PCP needed to sign re: medications.  This was done, but patient states they're saying there was nothing on the form re: past SVT/arrhythmia (records from [facility] apparently were updated in our system at one point), and patient reports he's talked with Dr. Herrera re: these conditions, and there's never been an issue with the form.  He was diagnosed with SVT by [facility] in 2009, has been on metoprolol since then and has not had a problem.  Patient states he's been asymptomatic for years since being on metoprolol, denies palpitations, chest pain, SOB or dizziness.    [facility]'s not exactly [facility]  RN advised will reach out to them for clarification.   Their number is [phone].   RN advised PCP is not in until 4/21/21.  We may be able to have another provider sign.      RN spoke with Yudi at [facility].  She states in previous years, Dr. Herrera signed off on the letter that included supraventricular arrhythmia/atrial fibrillation, but this year's letter states \"we do not have record of atrial fibrillation or SVT\".  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  An addendum letter sent to them by either PCP or another provider would be fine. Fax to [phone]-same as their phone line.  They'll be going to lunch until 130pm, but fax anytime after that is fine.     Betty De Leon RN  [facility]        "

## 2021-04-25 ENCOUNTER — OFFICE VISIT (OUTPATIENT)
Dept: URGENT CARE | Facility: URGENT CARE | Age: 46
End: 2021-04-25
Payer: COMMERCIAL

## 2021-04-25 VITALS
SYSTOLIC BLOOD PRESSURE: 137 MMHG | RESPIRATION RATE: 18 BRPM | TEMPERATURE: 97.4 F | DIASTOLIC BLOOD PRESSURE: 82 MMHG | WEIGHT: 315 LBS | BODY MASS INDEX: 39.76 KG/M2 | HEART RATE: 92 BPM

## 2021-04-25 DIAGNOSIS — L02.213 ABSCESS OF CHEST WALL: Primary | ICD-10-CM

## 2021-04-25 DIAGNOSIS — L02.213 ABSCESS OF CHEST WALL: ICD-10-CM

## 2021-04-25 PROCEDURE — 10061 I&D ABSCESS COMP/MULTIPLE: CPT | Performed by: EMERGENCY MEDICINE

## 2021-04-25 PROCEDURE — 99207 PR NO CHARGE LOS: CPT | Performed by: EMERGENCY MEDICINE

## 2021-04-25 PROCEDURE — 87070 CULTURE OTHR SPECIMN AEROBIC: CPT | Performed by: EMERGENCY MEDICINE

## 2021-04-25 RX ORDER — CLINDAMYCIN HCL 300 MG
300 CAPSULE ORAL 4 TIMES DAILY
Qty: 40 CAPSULE | Refills: 0 | Status: SHIPPED | OUTPATIENT
Start: 2021-04-25 | End: 2021-05-05

## 2021-04-25 ASSESSMENT — PAIN SCALES - GENERAL: PAINLEVEL: MILD PAIN (2)

## 2021-04-25 NOTE — PATIENT INSTRUCTIONS
Ice pack to the ear today because of minor surgical procedure.    Remove packing in 48 hours.    Start antibiotics today. 5 probiotics to take with antibiotics. Discussed with pharmacist regarding which probiotic.    Recheck of any worse swelling, drainage, pain, or fever.  Patient Education     Abscess (Incision & Drainage)  An abscess is sometimes called a boil. It happens when bacteria get trapped under the skin and start to grow. Pus forms inside the abscess as the body responds to the bacteria. An abscess can happen with an insect bite, ingrown hair, blocked oil gland, pimple, cyst, or puncture wound.   Your healthcare provider has drained the pus from your abscess. If the abscess pocket was large, your healthcare provider may have put in gauze packing. Your provider will need to remove or replace it on your next visit. . You may not need antibiotics to treat a simple abscess, unless the infection is spreading into the skin around the wound (cellulitis).   The wound will take about 1 to 2 weeks to heal, depending on the size of the abscess. Healthy tissue will grow from the bottom and sides of the opening until it seals over.   Home care  These tips can help your wound heal:  The wound may drain for the first 2 days. Cover the wound with a clean dry dressing. Change the dressing if it becomes soaked with blood or pus.  If a gauze packing was placed inside the abscess pocket, you may be told to remove it yourself. You may do this in the shower. Once the packing is removed, you should wash the area in the shower, or clean the area as directed by your provider. Continue to do this until the skin opening has closed. Make sure you wash your hands after changing the packing or cleaning the wound.  If you were prescribed antibiotics, take them as directed until they are all gone.  You may use acetaminophen or ibuprofen to control pain, unless another pain medicine was prescribed. If you have liver disease or ever  had a stomach ulcer, talk with your healthcare provider before using these medicines.  Follow-up care  Follow up with your healthcare provider, or as advised. If a gauze packing was put in your wound, it should be removed in 1 to 2 days. Check your wound every day for any signs that the infection is getting worse. The signs are listed below.   When to seek medical advice  Call your healthcare provider right away if any of these occur:  Increasing redness or swelling  Red streaks in the skin leading away from the wound  Increasing local pain or swelling  Continued pus draining from the wound 2 days after treatment  Fever of 100.4 F (38 C) or higher, or as directed by your healthcare provider  Boil returns when you are at home  [employer] last reviewed this educational content on 8/1/2019 2000-2021 [employer]. All rights reserved. This information is not intended as a substitute for professional medical care. Always follow your healthcare professional's instructions.

## 2021-04-25 NOTE — PROGRESS NOTES
Assessment: Chest wall abscess in the area of a chronic scar.  I&D, culture performed    Clindamycin 4 times daily.  Remove packing in 48 hours.  Recheck if any worsening signs of infection.    HPI: Patient is a 45-year-old male who has a history of a surgical scar which runs across his chest underneath his breast.  Over the last week or so he has had some increased redness and tenderness as well as some punctate drainage in the area underneath his right breast.  No previous similar problems.  No fever chills.      ROS: See HPI otherwise normal.    Allergies   Allergen Reactions     Ampicillin Rash     Codeine Rash     Erythromycin Rash     Keflex [Cephalexin Monohydrate] Rash     Penicillins Rash     Sulfa Drugs Rash      Current Outpatient Medications   Medication Sig Dispense Refill     albuterol (PROAIR HFA/PROVENTIL HFA/VENTOLIN HFA) 108 (90 Base) MCG/ACT inhaler Inhale 2 puffs into the lungs every 6 hours as needed for shortness of breath / dyspnea or wheezing 1 Inhaler 11     allopurinol (ZYLOPRIM) 300 MG tablet Take 1 tablet (300 mg) by mouth daily 90 tablet 3     clindamycin (CLEOCIN) 300 MG capsule Take 1 capsule (300 mg) by mouth 4 times daily for 10 days 40 capsule 0     gemfibrozil (LOPID) 600 MG tablet Take 1 tablet (600 mg) by mouth 2 times daily (before meals) 60 tablet 11     lisinopril (ZESTRIL) 10 MG tablet Take 1 tablet (10 mg) by mouth daily 90 tablet 3     metoprolol succinate ER (TOPROL-XL) 50 MG 24 hr tablet Take 1 tablet (50 mg) by mouth daily 90 tablet 3     montelukast (SINGULAIR) 10 MG tablet Take 1 tablet (10 mg) by mouth At Bedtime 90 tablet 3     multivitamin, therapeutic with minerals (MULTI-VITAMIN) TABS Take 1 tablet by mouth 2 times daily.       Omega-3 Fatty Acids (OMEGA-3 FISH OIL PO)            PE: No acute distress.  Afebrile.  Alert.  Examination of his chest reveals a 3 x 3 cm area of erythema under his right breast.  There is some slight fluctuance in that area.  Gentle  palpation does reveal a fistulous type tract with purulent drainage.  Culture sent.        TREATMENT: Procedure: The 2% plain Xylocaine local anesthetic, a 2 cm incision made with a large amount of purulent material removed.  The cavity was bluntly dissected to break up loculations.  Wound was packed with a iodoform.      ASSESSMENT: Abscess right chest wall.  I&D performed.      DIAGNOSIS: Abscess right chest wall      PLAN: Start clindamycin.  Remove dressing in 48 hours.  Recheck if any worsening infections as discussed.

## 2021-04-27 LAB
BACTERIA SPEC CULT: NORMAL
Lab: NORMAL
SPECIMEN SOURCE: NORMAL

## 2021-05-06 ENCOUNTER — IMMUNIZATION (OUTPATIENT)
Dept: FAMILY MEDICINE | Facility: CLINIC | Age: 46
End: 2021-05-06
Payer: COMMERCIAL

## 2021-05-06 PROCEDURE — 91301 PR COVID VAC MODERNA 100 MCG/0.5 ML IM: CPT

## 2021-05-06 PROCEDURE — 0011A PR COVID VAC MODERNA 100 MCG/0.5 ML IM: CPT

## 2021-06-03 ENCOUNTER — IMMUNIZATION (OUTPATIENT)
Dept: FAMILY MEDICINE | Facility: CLINIC | Age: 46
End: 2021-06-03
Attending: FAMILY MEDICINE
Payer: COMMERCIAL

## 2021-06-03 PROCEDURE — 91301 PR COVID VAC MODERNA 100 MCG/0.5 ML IM: CPT

## 2021-06-03 PROCEDURE — 0012A PR COVID VAC MODERNA 100 MCG/0.5 ML IM: CPT

## 2021-09-12 ENCOUNTER — HEALTH MAINTENANCE LETTER (OUTPATIENT)
Age: 46
End: 2021-09-12

## 2021-12-01 ENCOUNTER — OFFICE VISIT (OUTPATIENT)
Dept: URGENT CARE | Facility: URGENT CARE | Age: 46
End: 2021-12-01
Payer: COMMERCIAL

## 2021-12-01 VITALS
SYSTOLIC BLOOD PRESSURE: 136 MMHG | OXYGEN SATURATION: 97 % | DIASTOLIC BLOOD PRESSURE: 92 MMHG | RESPIRATION RATE: 20 BRPM | TEMPERATURE: 97.6 F | BODY MASS INDEX: 39.01 KG/M2 | WEIGHT: 310 LBS | HEART RATE: 89 BPM

## 2021-12-01 DIAGNOSIS — R05.9 COUGH: Primary | ICD-10-CM

## 2021-12-01 PROCEDURE — 99000 SPECIMEN HANDLING OFFICE-LAB: CPT | Performed by: NURSE PRACTITIONER

## 2021-12-01 PROCEDURE — U0005 INFEC AGEN DETEC AMPLI PROBE: HCPCS | Mod: 90 | Performed by: NURSE PRACTITIONER

## 2021-12-01 PROCEDURE — U0003 INFECTIOUS AGENT DETECTION BY NUCLEIC ACID (DNA OR RNA); SEVERE ACUTE RESPIRATORY SYNDROME CORONAVIRUS 2 (SARS-COV-2) (CORONAVIRUS DISEASE [COVID-19]), AMPLIFIED PROBE TECHNIQUE, MAKING USE OF HIGH THROUGHPUT TECHNOLOGIES AS DESCRIBED BY CMS-2020-01-R: HCPCS | Mod: 90 | Performed by: NURSE PRACTITIONER

## 2021-12-01 PROCEDURE — 99213 OFFICE O/P EST LOW 20 MIN: CPT | Performed by: NURSE PRACTITIONER

## 2021-12-01 NOTE — LETTER
[facility] [place]  [address] [address]  Phone: [phone]  Fax: [phone]    December 1, 2021        Mark Conn  [address] [address] [place] [id number]          To whom it may concern:    RE: Mark Conn    Patient was seen and treated today at our clinic and missed work.  He will be off 12/2/21-12/3/21 and can return to work when testing is back if negative.    Please contact me for questions or concerns.      Sincerely,        No Ballesteros NP

## 2021-12-02 LAB
SARS-COV-2 RNA RESP QL NAA+PROBE: NORMAL
SARS-COV-2 RNA RESP QL NAA+PROBE: NOT DETECTED

## 2021-12-02 NOTE — PATIENT INSTRUCTIONS
"1.  Covid testing can take 1-3 days to come back.  You will be notified through [facility].  2.  This most likely is an upper respiratory infection.  3.  Handout given on symptom management for upper respiratory  4.  Follow-up if any persistent symptoms over the next week or sooner if worsening for recheck.      Patient Education   After Your COVID-19 (Coronavirus) Test  You have been tested for COVID-19 (coronavirus).   If you'll have surgery in the next few days, we'll let you know ahead of time if you have the virus. Please call your surgeon's office with any questions.  For all other patients: Results are usually available in [facility] within 2 to 3 days.   If you do not have a Navent account, you'll get a letter in the mail in about 7 to 10 days.   [employer] is often the fastest way to get test results. Please sign up if you do not already have a Navent account. See the handout Getting COVID-19 Test Results in [facility] for help.  What if my test result is positive?  If your test is positive and you have not viewed your result in [employer]ent, you'll get a phone call with your result. (A positive test means that you have the virus.)     Follow the tips under \"How do I self-isolate?\" below for 10 days (20 days if you have a weak immune system).    You don't need to be retested for COVID-19 before going back to school or work. As long as you're fever-free and feeling better, you can go back to school, work and other activities after waiting the 10 or 20 days.  What if I have questions after I get my results?  If you have questions about your results, please visit our testing website at [email or web address].   After 7 to 10 days, if you have not gotten your results:     Call [phone] ([phone]) and ask to speak with our COVID-19 results team.    If you're being treated at an infusion center: Call your infusion center directly.  What are the symptoms of COVID-19?  Cough, fever and " "trouble breathing are the most common signs of COVID-19.  Other symptoms can include new headaches, new muscle or body aches, new and unexplained fatigue (feeling very tired), chills, sore throat, congestion (stuffy or runny nose), diarrhea (loose poop), loss of taste or smell, belly pain, and nausea or vomiting (feeling sick to your stomach or throwing up).  You may already have symptoms of COVID-19, or they may show up later.  What should I do if I have symptoms?  If you're having surgery: Call your surgeon's office.  For all other patients: Stay home and away from others (self-isolate) until ...    You've had no fever--and no medicine that reduces fever--for 1 full day (24 hours), AND    Other symptoms have gotten better. For example, your cough or breathing has improved, AND    At least 10 days have passed since your symptoms first started.  How do I self-isolate?    Stay in your own room, even for meals. Use your own bathroom if you can.    Stay away from others in your home. No hugging, kissing or shaking hands. No visitors.    Don't go to work, school or anywhere else.    Clean \"high touch\" surfaces often (doorknobs, counters, handles). Use household cleaning spray or wipes. You'll find a full list of  on the EPA website: www.epa.gov/pesticide-registration/list-n-disinfectants-use-against-sars-cov-2.    Cover your mouth and nose with a mask or other face covering to avoid spreading germs.    Wash your hands and face often. Use soap and water.    Caregivers in these groups are at risk for severe illness due to COVID-19:  ? People 65 years and older  ? People who live in a nursing home or long-term care facility  ? People with chronic disease (lung, heart, cancer, diabetes, kidney, liver, immunologic)  ? People who have a weakened immune system, including those who:    Are in cancer treatment    Take medicine that weakens the immune system, such as corticosteroids    Had a bone marrow or organ " transplant    Have an immune deficiency    Have poorly controlled HIV or AIDS    Are obese (body mass index of 40 or higher)    Smoke regularly    Caregivers should wear gloves while washing dishes, handling laundry and cleaning bedrooms and bathrooms.    Use caution when washing and drying laundry: Don't shake dirty laundry and use the warmest water setting that you can.    For more tips on managing your health at home, go to www.cdc.gov/coronavirus/2019-ncov/downloads/10Things.pdf.  How can I take care of myself at home?  1. Get lots of rest. Drink extra fluids (unless a doctor has told you not to).  2. Take Tylenol (acetaminophen) for fever or pain. If you have liver or kidney problems, ask your family doctor if it's OK to take Tylenol.   Adults can take either:  ? 650 mg (two 325 mg pills) every 4 to 6 hours, or   ? 1,000 mg (two 500 mg pills) every 8 hours as needed.  ? Note: Don't take more than 3,000 mg in one day. Acetaminophen is found in many medicines (both prescribed and over-the-counter medicines). Read all labels to be sure you don't take too much.   For children, check the Tylenol bottle for the right dose. The dose is based on the child's age or weight.  3. If you have other health problems (like cancer, heart failure, an organ transplant or severe kidney disease): Call your specialty clinic if you don't feel better in the next 2 days.  4. Know when to call 911. Emergency warning signs include:  ? Trouble breathing or shortness of breath  ? Chest pain or pressure that doesn't go away  ? Feeling confused like you haven't felt before, or not being able to wake up  ? Bluish-colored lips or face  5. If your doctor prescribed a blood thinner medicine: Follow their instructions.  Where can I get more information?     [facility] - About COVID-19:   [email or web address]    CDC - If You're Sick: cdc.gov/coronavirus/2019-ncov/about/steps-when-sick.html    CDC - Ending Home Isolation:  www.cdc.gov/coronavirus/2019-ncov/hcp/disposition-in-home-patients.html    CDC - Caring for Someone: www.cdc.gov/coronavirus/2019-ncov/if-you-are-sick/care-for-someone.html    [facility] - Interim Guidance for Hospital Discharge to Home: www.health.[facility].mn.us/diseases/coronavirus/hcp/hospdischarge.pdf    [facility] clinical trials (COVID-19 research studies): clinicalaffairs.[email or web address]-clinical-trials    Below are the COVID-19 hotlines at the [place] Department of Health ([facility]). Interpreters are available.  ? For health questions: Call 093-770-7144 or [phone] (7 a.m. to 7 p.m.)  ? For questions about schools and childcare: Call 803-431-9658 or [phone] (7 a.m. to 7 p.m.)    For informational purposes only. Not to replace the advice of your health care provider. Clinically reviewed by Infection Prevention and [facility] COVID-19 Clinical Team. Copyright   2020 [employer] [facility]. All rights reserved. [employer] 647740 - Rev 11/11/20.       Patient Education     Viral Upper Respiratory Illness (Adult)    You have a viral upper respiratory illness (URI), which is another term for the common cold. This illness is contagious during the first few days. It is spread through the air by coughing and sneezing. It may also be spread by direct contact (touching the sick person and then touching your own eyes, nose, or mouth). Frequent handwashing will decrease risk of spread. Most viral illnesses go away within 7 to 10 days with rest and simple home remedies. Sometimes the illness may last for several weeks. Antibiotics will not kill a virus, and they are generally not prescribed for this condition.  Home care    If symptoms are severe, rest at home for the first 2 to 3 days. When you resume activity, don't let yourself get too tired.    Don't smoke. If you need help stopping, talk with your healthcare provider.    Avoid being exposed to cigarette smoke (yours or others ).    You may use  acetaminophen or ibuprofen to control pain and fever, unless another medicine was prescribed. If you have chronic liver or kidney disease, have ever had a stomach ulcer or gastrointestinal bleeding, or are taking blood-thinning medicines, talk with your healthcare provider before using these medicines. Aspirin should never be given to anyone under 18 years of age who is ill with a viral infection or fever. It may cause severe liver or brain damage.    Your appetite may be poor, so a light diet is fine. Stay well hydrated by drinking 6 to 8 glasses of fluids per day (water, soft drinks, juices, tea, or soup). Extra fluids will help loosen secretions in the nose and lungs.    Over-the-counter cold medicines will not shorten the length of time you re sick, but they may be helpful for the following symptoms: cough, sore throat, and nasal and sinus congestion. If you take prescription medicines, ask your healthcare provider or pharmacist which over-the-counter medicines are safe to use. (Note: Don't use decongestants if you have high blood pressure.)  Follow-up care  Follow up with your healthcare provider, or as advised.  When to seek medical advice  Call your healthcare provider right away if any of these occur:    Cough with lots of colored sputum (mucus)    Severe headache; face, neck, or ear pain    Difficulty swallowing due to throat pain    Fever of 100.4 F (38 C) or higher, or as directed by your healthcare provider  Call 911  Call 911 if any of these occur:    Chest pain, shortness of breath, wheezing, or difficulty breathing    Coughing up blood    Very severe pain with swallowing, especially if it goes along with a muffled voice   [employer] last reviewed this educational content on 6/1/2018 2000-2021 [employer]. All rights reserved. This information is not intended as a substitute for professional medical care. Always follow your healthcare professional's instructions.

## 2021-12-02 NOTE — PROGRESS NOTES
Assessment & Plan     Cough  COVID testing collected.  Discussed that this can take up to 3 days for results.  Note given for work to be off until testing is complete and negative.  Handouts given on After your COVID 19 testing and URI symptom management.  Recommend follow-up if any persistent symptoms over the next week or sooner if worsening.  - Symptomatic COVID-19 Virus (Coronavirus) by PCR Nasopharyngeal    Return in about 1 week (around 12/8/2021), or if symptoms worsen or fail to improve.    No Ballesteros NP  [facility]    Ana Andrew is a 46 year old who presents for the following health issues     HPI     Chief Complaint   Patient presents with     Cough     cough for 4 days and getting worse.      URI Adult    Onset of symptoms was 4 day(s) ago.  Course of illness is worsening.    Severity moderate  Current and Associated symptoms: cough - productive with scant amounts of white phlegm  Treatment measures tried include None tried.  Predisposing factors include None.  Hacking up a white mucous, chest tightness with cough but no shortness of breath, no loss of taste or smell, no fevers, no GI complaints  Patient's wife has IBD and undergoes treatments of immunosuppressant infusions and he wants to rule out COVID 19 since she is due for her infusion    Review of Systems   CONSTITUTIONAL: NEGATIVE for fever, chills, change in weight  ENT/MOUTH: NEGATIVE for ear, mouth and throat problems  RESP:POSITIVE for cough-productive with scant amounts of white phlegm  CV: NEGATIVE for chest pain, palpitations or peripheral edema  PSYCHIATRIC: NEGATIVE for changes in mood or affect  ROS otherwise negative      Objective    BP (!) 136/92 (BP Location: Right arm, Patient Position: Sitting, Cuff Size: Adult Large)   Pulse 89   Temp 97.6  F (36.4  C) (Tympanic)   Resp 20   Wt 140.6 kg (310 lb)   SpO2 97%   BMI 39.01 kg/m    Body mass index is 39.01 kg/m .  Physical Exam    GENERAL: healthy, alert and no distress  HENT: ear canals and TM's normal, nose and mouth without ulcers or lesions  NECK: no adenopathy and no asymmetry, masses, or scars  RESP: lungs clear to auscultation - no rales, rhonchi or wheezes  CV: regular rate and rhythm, normal S1 S2, no S3 or S4, no murmur, click or rub, no peripheral edema and peripheral pulses strong  PSYCH: mentation appears normal, affect normal/bright

## 2021-12-21 ENCOUNTER — ANCILLARY PROCEDURE (OUTPATIENT)
Dept: GENERAL RADIOLOGY | Facility: CLINIC | Age: 46
End: 2021-12-21
Attending: NURSE PRACTITIONER
Payer: COMMERCIAL

## 2021-12-21 ENCOUNTER — OFFICE VISIT (OUTPATIENT)
Dept: FAMILY MEDICINE | Facility: CLINIC | Age: 46
End: 2021-12-21
Payer: COMMERCIAL

## 2021-12-21 VITALS
WEIGHT: 315 LBS | DIASTOLIC BLOOD PRESSURE: 78 MMHG | HEART RATE: 76 BPM | BODY MASS INDEX: 39.17 KG/M2 | HEIGHT: 75 IN | TEMPERATURE: 98.4 F | SYSTOLIC BLOOD PRESSURE: 150 MMHG

## 2021-12-21 DIAGNOSIS — M79.671 PAIN IN BOTH FEET: Primary | ICD-10-CM

## 2021-12-21 DIAGNOSIS — I47.10 SVT (SUPRAVENTRICULAR TACHYCARDIA) (H): ICD-10-CM

## 2021-12-21 DIAGNOSIS — M79.672 PAIN IN BOTH FEET: Primary | ICD-10-CM

## 2021-12-21 DIAGNOSIS — E66.01 MORBID OBESITY (H): ICD-10-CM

## 2021-12-21 DIAGNOSIS — M79.672 PAIN IN BOTH FEET: ICD-10-CM

## 2021-12-21 DIAGNOSIS — M79.671 PAIN IN BOTH FEET: ICD-10-CM

## 2021-12-21 DIAGNOSIS — I10 BENIGN ESSENTIAL HYPERTENSION: ICD-10-CM

## 2021-12-21 DIAGNOSIS — M72.2 PLANTAR FASCIITIS: ICD-10-CM

## 2021-12-21 DIAGNOSIS — G62.9 NEUROPATHY: ICD-10-CM

## 2021-12-21 DIAGNOSIS — J45.20 MILD INTERMITTENT ASTHMA WITHOUT COMPLICATION: ICD-10-CM

## 2021-12-21 PROCEDURE — 99215 OFFICE O/P EST HI 40 MIN: CPT | Performed by: NURSE PRACTITIONER

## 2021-12-21 PROCEDURE — 73630 X-RAY EXAM OF FOOT: CPT | Mod: RT | Performed by: RADIOLOGY

## 2021-12-21 RX ORDER — GABAPENTIN 300 MG/1
300 CAPSULE ORAL AT BEDTIME
Qty: 90 CAPSULE | Refills: 0 | Status: SHIPPED | OUTPATIENT
Start: 2021-12-21 | End: 2022-02-07

## 2021-12-21 RX ORDER — LISINOPRIL 20 MG/1
20 TABLET ORAL DAILY
Qty: 90 TABLET | Refills: 3 | Status: SHIPPED | OUTPATIENT
Start: 2021-12-21 | End: 2022-02-07

## 2021-12-21 ASSESSMENT — MIFFLIN-ST. JEOR: SCORE: 2399.1

## 2021-12-21 ASSESSMENT — PAIN SCALES - GENERAL: PAINLEVEL: MILD PAIN (3)

## 2021-12-21 NOTE — LETTER
My Asthma Action Plan    Name: Mark Conn   YOB: 1975  Date: 12/21/2021   My doctor: LBAISE Quinn CNP   My clinic: [facility]        My Rescue Medicine:   Albuterol inhaler (Proair/Ventolin/Proventil HFA)  2-4 puffs EVERY 4 HOURS as needed. Use a spacer if recommended by your provider.   My Asthma Severity:   Intermittent / Exercise Induced  Know your asthma triggers: Patient is unaware of triggers  cold air          GREEN ZONE   Good Control    I feel good    No cough or wheeze    Can work, sleep and play without asthma symptoms       Take your asthma control medicine every day.     1. If exercise triggers your asthma, take your rescue medication    15 minutes before exercise or sports, and    During exercise if you have asthma symptoms  2. Spacer to use with inhaler: If you have a spacer, make sure to use it with your inhaler             YELLOW ZONE Getting Worse  I have ANY of these:    I do not feel good    Cough or wheeze    Chest feels tight    Wake up at night   1. Keep taking your Green Zone medications  2. Start taking your rescue medicine:    every 20 minutes for up to 1 hour. Then every 4 hours for 24-48 hours.  3. If you stay in the Yellow Zone for more than 12-24 hours, contact your doctor.  4. If you do not return to the Green Zone in 12-24 hours or you get worse, start taking your oral steroid medicine if prescribed by your provider.           RED ZONE Medical Alert - Get Help  I have ANY of these:    I feel awful    Medicine is not helping    Breathing getting harder    Trouble walking or talking    Nose opens wide to breathe       1. Take your rescue medicine NOW  2. If your provider has prescribed an oral steroid medicine, start taking it NOW  3. Call your doctor NOW  4. If you are still in the Red Zone after 20 minutes and you have not reached your doctor:    Take your rescue medicine again and    Call 911 or go to the emergency room right  away    See your regular doctor within 2 weeks of an Emergency Room or Urgent Care visit for follow-up treatment.          Annual Reminders:  Meet with Asthma Educator,  Flu Shot in the Fall, consider Pneumonia Vaccination for patients with asthma (aged 19 and older).    Pharmacy:    [id number] - [place], [place] - [address], [place] - [address] [facility], [place] - [address] [place] [address]    Electronically signed by BLAISE Quinn CNP   Date: 12/21/21                    Asthma Triggers  How To Control Things That Make Your Asthma Worse    Triggers are things that make your asthma worse.  Look at the list below to help you find your triggers and   what you can do about them. You can help prevent asthma flare-ups by staying away from your triggers.      Trigger                                                          What you can do   Cigarette Smoke  Tobacco smoke can make asthma worse. Do not allow smoking in your home, car or around you.  Be sure no one smokes at a child s day care or school.  If you smoke, ask your health care provider for ways to help you quit.  Ask family members to quit too.  Ask your health care provider for a referral to Quit Plan to help you quit smoking, or call 0-357-514-PLAN.     Colds, Flu, Bronchitis  These are common triggers of asthma. Wash your hands often.  Don t touch your eyes, nose or mouth.  Get a flu shot every year.     Dust Mites  These are tiny bugs that live in cloth or carpet. They are too small to see. Wash sheets and blankets in hot water every week.   Encase pillows and mattress in dust mite proof covers.  Avoid having carpet if you can. If you have carpet, vacuum weekly.   Use a dust mask and HEPA vacuum.   Pollen and Outdoor Mold  Some people are allergic to trees, grass, or weed pollen, or molds. Try to keep your windows closed.  Limit time out  doors when pollen count is high.   Ask you health care provider about taking medicine during allergy season.     Animal Dander  Some people are allergic to skin flakes, urine or saliva from pets with fur or feathers. Keep pets with fur or feathers out of your home.    If you can t keep the pet outdoors, then keep the pet out of your bedroom.  Keep the bedroom door closed.  Keep pets off cloth furniture and away from stuffed toys.     Mice, Rats, and Cockroaches  Some people are allergic to the waste from these pests.   Cover food and garbage.  Clean up spills and food crumbs.  Store grease in the refrigerator.   Keep food out of the bedroom.   Indoor Mold  This can be a trigger if your home has high moisture. Fix leaking faucets, pipes, or other sources of water.   Clean moldy surfaces.  Dehumidify basement if it is damp and smelly.   Smoke, Strong Odors, and Sprays  These can reduce air quality. Stay away from strong odors and sprays, such as perfume, powder, hair spray, paints, smoke incense, paint, cleaning products, candles and new carpet.   Exercise or Sports  Some people with asthma have this trigger. Be active!  Ask your doctor about taking medicine before sports or exercise to prevent symptoms.    Warm up for 5-10 minutes before and after sports or exercise.     Other Triggers of Asthma  Cold air:  Cover your nose and mouth with a scarf.  Sometimes laughing or crying can be a trigger.  Some medicines and food can trigger asthma.

## 2021-12-21 NOTE — PROGRESS NOTES
"  Assessment & Plan     (J45.20) Mild intermittent asthma without complication  (primary encounter diagnosis)  Comment: Uncontrolled will have patient start Qvar  Plan: beclomethasone HFA (QVAR REDIHALER) 40 MCG/ACT         inhaler    (I47.1) SVT (supraventricular tachycardia) (H)  Comment:  Controlled no change in treatment plan  Plan:     (E66.01) Morbid obesity (H)  Comment: Reviewed healthy diet and exercise  Plan:     (I10) Benign essential hypertension  Comment: Uncontrolled will increase lisinopril to 20 mg  Plan: lisinopril (ZESTRIL) 20 MG tablet      (M79.671,  M79.672) Pain in both feet  Comment: X-ray feet does show some spurring we will continue to monitor and patient follow-up with orthopedics if not improving   Plan: XR Foot Bilateral G/E 3 Views      (M72.2) Plantar fasciitis  Comment: Symptoms also representative of plantar fasciitis will have patient obtain orthotics again if not improving post orthotics next steps will have patient follow-up with orthopedics podiatry  Plan: Orthotics, Prosthetics and Custom Compression         Order    (G62.9) Neuropathy  Comment: Symptoms also representative of neuropathy will start gabapentin to see if symptoms improve  Plan: gabapentin (NEURONTIN) 300 MG capsule        55  minutes spent on the date of the encounter doing chart review, history and exam, documentation and further activities per the note       BMI:   Estimated body mass index is 40.01 kg/m  as calculated from the following:    Height as of this encounter: 1.899 m (6' 2.75\").    Weight as of this encounter: 144.2 kg (318 lb).   Weight management plan: Discussed healthy diet and exercise guidelines        No follow-ups on file.    BLAISE Quinn CNP  [facility]    Ana Andrew is a 46 year old who presents for the following health issues     HPI     Foot Pain    Onset: about one year    Description:   Location: both feet  Character: Cramping    Intensity: " "moderate    Progression of Symptoms: worse    Accompanying Signs & Symptoms:  Other symptoms: toes feel cold, tingling, left foot has recently been swollen    History:   Previous similar pain: no       Precipitating factors:   Trauma or overuse: no     Alleviating factors:  Improved by: nothing    Therapies Tried and outcome: none           Constitutional, HEENT, cardiovascular, pulmonary, gi and gu systems are negative, except as otherwise noted.      Objective    BP (!) 150/78 (BP Location: Right arm, Cuff Size: Adult Large)   Pulse 76   Temp 98.4  F (36.9  C) (Tympanic)   Ht 1.899 m (6' 2.75\")   Wt 144.2 kg (318 lb)   BMI 40.01 kg/m    There is no height or weight on file to calculate BMI.  Physical Exam   GENERAL: healthy, alert and no distress  EYES: Eyes grossly normal to inspection, PERRL and conjunctivae and sclerae normal  HENT: ear canals and TM's normal, nose and mouth without ulcers or lesions  NECK: no adenopathy, no asymmetry, masses, or scars and thyroid normal to palpation  RESP: lungs clear to auscultation - no rales, rhonchi or wheezes  CV: regular rate and rhythm, normal S1 S2, no S3 or S4, no murmur, click or rub, no peripheral edema and peripheral pulses strong  ABDOMEN: soft, nontender, no hepatosplenomegaly, no masses and bowel sounds normal  MS: no gross musculoskeletal defects noted, no edema pain noted to plantar fasciitis tendon bilateral also pain noted to the upper portions of the foot bilateral  SKIN: no suspicious lesions or rashes  NEURO: Normal strength and tone, mentation intact and speech normal  PSYCH: mentation appears normal, affect normal/bright   X-ray reviewed and interpreted by myself in office no acute findings will await final radiology report    Results for orders placed or performed in visit on 12/21/21   XR Foot Bilateral G/E 3 Views     Status: None    Narrative    XR FOOT BILATERAL G/E 3 VIEWS 12/21/2021 1:26 PM     HISTORY: Pain in both feet; Pain in both feet   "    Impression    IMPRESSION: Bilateral calcaneal spurs. No calcaneal erosions.  Otherwise unremarkable foot radiographs.    TRISTIN CHASE MD         SYSTEM ID:  [id number]               DME (Durable Medical Equipment) Orders and Documentation  Orders Placed This Encounter   Procedures     Orthotics, Prosthetics and Custom Compression Order      The patient was assessed and it was determined the patient is in need of the following listed DME Supplies/Equipment. Please complete supporting documentation below to demonstrate medical necessity.      DME All Other Item(s) Documentation    List reason for need and supporting documentation for medical necessity below for each DME item.     1.

## 2021-12-22 ASSESSMENT — ASTHMA QUESTIONNAIRES: ACT_TOTALSCORE: 18

## 2021-12-28 ENCOUNTER — TELEPHONE (OUTPATIENT)
Dept: FAMILY MEDICINE | Facility: CLINIC | Age: 46
End: 2021-12-28
Payer: COMMERCIAL

## 2022-01-02 ENCOUNTER — HEALTH MAINTENANCE LETTER (OUTPATIENT)
Age: 47
End: 2022-01-02

## 2022-01-21 ENCOUNTER — ALLIED HEALTH/NURSE VISIT (OUTPATIENT)
Dept: FAMILY MEDICINE | Facility: CLINIC | Age: 47
End: 2022-01-21
Payer: COMMERCIAL

## 2022-01-21 VITALS — DIASTOLIC BLOOD PRESSURE: 76 MMHG | SYSTOLIC BLOOD PRESSURE: 112 MMHG

## 2022-01-21 DIAGNOSIS — I10 BENIGN ESSENTIAL HYPERTENSION: Primary | ICD-10-CM

## 2022-01-21 PROCEDURE — 99207 PR NO CHARGE NURSE ONLY: CPT | Performed by: NURSE PRACTITIONER

## 2022-01-21 NOTE — Clinical Note
Mark Conn was evaluated in a [facility] today at which time his blood pressure was within range. He has been advised to continue his current medication regimen and have another BP check at his next office visit.  We are also routing this message to his primary care provider as an FYI.

## 2022-01-21 NOTE — PROGRESS NOTES
Mark Conn was evaluated at [facility] on January 21, 2022 at which time his blood pressure was:    BP Readings from Last 3 Encounters:   01/21/22 112/76   12/21/21 (!) 150/78   12/01/21 (!) 136/92     Pulse Readings from Last 3 Encounters:   12/21/21 76   12/01/21 89   04/25/21 92       Reviewed lifestyle modifications for blood pressure control and reduction: including making healthy food choices, managing weight, getting regular exercise, smoking cessation, reducing alcohol consumption, monitoring blood pressure regularly.     Symptoms: None    BP Goal:< 140/90 mmHg    BP Assessment:  BP at goal    Potential Reasons for BP too high: NA - Not applicable    BP Follow-Up Plan: Continue with current medications and have a recheck at next office visit.    Recommendation to Provider: Kamran's new blood pressure medications are doing a fantastic job at keeping his blood pressure within range. He even had a vaccine at this appointment.    Note completed by:  Lanny Silverman, PharmD  Staff Pharmacist  [facility]  [phone]

## 2022-02-07 ENCOUNTER — OFFICE VISIT (OUTPATIENT)
Dept: FAMILY MEDICINE | Facility: CLINIC | Age: 47
End: 2022-02-07
Payer: COMMERCIAL

## 2022-02-07 VITALS
TEMPERATURE: 97.9 F | OXYGEN SATURATION: 95 % | BODY MASS INDEX: 39.17 KG/M2 | SYSTOLIC BLOOD PRESSURE: 132 MMHG | WEIGHT: 315 LBS | DIASTOLIC BLOOD PRESSURE: 80 MMHG | HEART RATE: 91 BPM | HEIGHT: 75 IN

## 2022-02-07 DIAGNOSIS — Z12.5 SCREENING FOR PROSTATE CANCER: ICD-10-CM

## 2022-02-07 DIAGNOSIS — Z12.0 ENCOUNTER FOR SCREENING FOR GASTRIC CANCER: ICD-10-CM

## 2022-02-07 DIAGNOSIS — Z11.4 SCREENING FOR HIV (HUMAN IMMUNODEFICIENCY VIRUS): ICD-10-CM

## 2022-02-07 DIAGNOSIS — I47.10 SVT (SUPRAVENTRICULAR TACHYCARDIA) (H): ICD-10-CM

## 2022-02-07 DIAGNOSIS — I10 BENIGN ESSENTIAL HYPERTENSION: Primary | ICD-10-CM

## 2022-02-07 DIAGNOSIS — E78.1 HYPERTRIGLYCERIDEMIA: ICD-10-CM

## 2022-02-07 DIAGNOSIS — Z13.29 SCREENING FOR HYPOTHYROIDISM: ICD-10-CM

## 2022-02-07 DIAGNOSIS — Z12.11 SCREEN FOR COLON CANCER: ICD-10-CM

## 2022-02-07 DIAGNOSIS — M1A.9XX0 CHRONIC GOUT WITHOUT TOPHUS, UNSPECIFIED CAUSE, UNSPECIFIED SITE: ICD-10-CM

## 2022-02-07 DIAGNOSIS — J45.40 MODERATE PERSISTENT ASTHMA, UNSPECIFIED WHETHER COMPLICATED: ICD-10-CM

## 2022-02-07 DIAGNOSIS — Z11.59 NEED FOR HEPATITIS C SCREENING TEST: ICD-10-CM

## 2022-02-07 DIAGNOSIS — Z13.220 SCREENING FOR HYPERLIPIDEMIA: ICD-10-CM

## 2022-02-07 DIAGNOSIS — J45.20 MILD INTERMITTENT ASTHMA WITHOUT COMPLICATION: ICD-10-CM

## 2022-02-07 DIAGNOSIS — E66.01 MORBID OBESITY (H): ICD-10-CM

## 2022-02-07 DIAGNOSIS — G62.9 NEUROPATHY: ICD-10-CM

## 2022-02-07 LAB
ALBUMIN SERPL-MCNC: 4 G/DL (ref 3.4–5)
ALP SERPL-CCNC: 50 U/L (ref 40–150)
ALT SERPL W P-5'-P-CCNC: 44 U/L (ref 0–70)
ANION GAP SERPL CALCULATED.3IONS-SCNC: 3 MMOL/L (ref 3–14)
AST SERPL W P-5'-P-CCNC: 26 U/L (ref 0–45)
BILIRUB SERPL-MCNC: 0.7 MG/DL (ref 0.2–1.3)
BUN SERPL-MCNC: 16 MG/DL (ref 7–30)
CALCIUM SERPL-MCNC: 9 MG/DL (ref 8.5–10.1)
CHLORIDE BLD-SCNC: 101 MMOL/L (ref 94–109)
CHOLEST SERPL-MCNC: 179 MG/DL
CO2 SERPL-SCNC: 30 MMOL/L (ref 20–32)
CREAT SERPL-MCNC: 0.91 MG/DL (ref 0.66–1.25)
ERYTHROCYTE [DISTWIDTH] IN BLOOD BY AUTOMATED COUNT: 13.6 % (ref 10–15)
FASTING STATUS PATIENT QL REPORTED: YES
GFR SERPL CREATININE-BSD FRML MDRD: >90 ML/MIN/1.73M2
GLUCOSE BLD-MCNC: 119 MG/DL (ref 70–99)
HCT VFR BLD AUTO: 48.1 % (ref 40–53)
HDLC SERPL-MCNC: 35 MG/DL
HGB BLD-MCNC: 16.9 G/DL (ref 13.3–17.7)
LDLC SERPL CALC-MCNC: 96 MG/DL
MCH RBC QN AUTO: 30.7 PG (ref 26.5–33)
MCHC RBC AUTO-ENTMCNC: 35.1 G/DL (ref 31.5–36.5)
MCV RBC AUTO: 88 FL (ref 78–100)
NONHDLC SERPL-MCNC: 144 MG/DL
PLATELET # BLD AUTO: 236 10E3/UL (ref 150–450)
POTASSIUM BLD-SCNC: 4.5 MMOL/L (ref 3.4–5.3)
PROT SERPL-MCNC: 8 G/DL (ref 6.8–8.8)
PSA SERPL-MCNC: 0.28 UG/L (ref 0–4)
RBC # BLD AUTO: 5.5 10E6/UL (ref 4.4–5.9)
SODIUM SERPL-SCNC: 134 MMOL/L (ref 133–144)
TRIGL SERPL-MCNC: 240 MG/DL
TSH SERPL DL<=0.005 MIU/L-ACNC: 2.73 MU/L (ref 0.4–4)
WBC # BLD AUTO: 6.9 10E3/UL (ref 4–11)

## 2022-02-07 PROCEDURE — 84443 ASSAY THYROID STIM HORMONE: CPT | Performed by: NURSE PRACTITIONER

## 2022-02-07 PROCEDURE — 36415 COLL VENOUS BLD VENIPUNCTURE: CPT | Performed by: NURSE PRACTITIONER

## 2022-02-07 PROCEDURE — G0103 PSA SCREENING: HCPCS | Performed by: NURSE PRACTITIONER

## 2022-02-07 PROCEDURE — 99214 OFFICE O/P EST MOD 30 MIN: CPT | Performed by: NURSE PRACTITIONER

## 2022-02-07 PROCEDURE — 85027 COMPLETE CBC AUTOMATED: CPT | Performed by: NURSE PRACTITIONER

## 2022-02-07 PROCEDURE — 80053 COMPREHEN METABOLIC PANEL: CPT | Performed by: NURSE PRACTITIONER

## 2022-02-07 PROCEDURE — 80061 LIPID PANEL: CPT | Performed by: NURSE PRACTITIONER

## 2022-02-07 RX ORDER — GEMFIBROZIL 600 MG/1
600 TABLET, FILM COATED ORAL
Qty: 180 TABLET | Refills: 3 | Status: SHIPPED | OUTPATIENT
Start: 2022-02-07 | End: 2023-03-21

## 2022-02-07 RX ORDER — ALLOPURINOL 300 MG/1
1 TABLET ORAL DAILY
Qty: 90 TABLET | Refills: 3 | Status: SHIPPED | OUTPATIENT
Start: 2022-02-07 | End: 2023-02-27

## 2022-02-07 RX ORDER — GABAPENTIN 300 MG/1
300 CAPSULE ORAL AT BEDTIME
Qty: 90 CAPSULE | Refills: 1 | Status: SHIPPED | OUTPATIENT
Start: 2022-02-07 | End: 2022-09-19

## 2022-02-07 RX ORDER — METOPROLOL SUCCINATE 50 MG/1
50 TABLET, EXTENDED RELEASE ORAL DAILY
Qty: 90 TABLET | Refills: 3 | Status: SHIPPED | OUTPATIENT
Start: 2022-02-07 | End: 2023-01-31

## 2022-02-07 RX ORDER — LISINOPRIL 20 MG/1
20 TABLET ORAL DAILY
Qty: 90 TABLET | Refills: 3 | Status: SHIPPED | OUTPATIENT
Start: 2022-02-07 | End: 2023-03-21

## 2022-02-07 RX ORDER — ALBUTEROL SULFATE 90 UG/1
2 AEROSOL, METERED RESPIRATORY (INHALATION) EVERY 6 HOURS PRN
Qty: 18 G | Refills: 3 | Status: SHIPPED | OUTPATIENT
Start: 2022-02-07 | End: 2024-03-26

## 2022-02-07 RX ORDER — MONTELUKAST SODIUM 10 MG/1
10 TABLET ORAL AT BEDTIME
Qty: 90 TABLET | Refills: 3 | Status: SHIPPED | OUTPATIENT
Start: 2022-02-07 | End: 2023-02-28

## 2022-02-07 ASSESSMENT — MIFFLIN-ST. JEOR: SCORE: 2421.78

## 2022-02-07 NOTE — PROGRESS NOTES
Assessment & Plan     (I10) Benign essential hypertension  (primary encounter diagnosis)  Comment:  Controlled no change in treatment plan  Plan: CBC with platelets, metoprolol succinate ER         (TOPROL-XL) 50 MG 24 hr tablet, lisinopril         (ZESTRIL) 20 MG tablet      (Z11.4) Screening for HIV (human immunodeficiency virus)  Comment: declined   Plan:     (Z11.59) Need for hepatitis C screening test  Comment: declined   Plan:     (Z12.11) Screen for colon cancer  Comment:   Plan: Adult Gastro Ref - Procedure Only            (Z12.0) Encounter for screening for gastric cancer  Comment:   Plan: Adult Gastro Ref - Procedure Only,         Comprehensive metabolic panel (BMP + Alb, Alk         Phos, ALT, AST, Total. Bili, TP)            (Z12.5) Screening for prostate cancer  Comment:   Plan: PSA, screen            (Z13.220) Screening for hyperlipidemia  Comment:   Plan: Lipid panel reflex to direct LDL Fasting            (Z13.29) Screening for hypothyroidism  Comment:   Plan: TSH with free T4 reflex      (E78.1) Hypertriglyceridemia  Comment:  Controlled no change in treatment plan  Plan: gemfibrozil (LOPID) 600 MG tablet          (J45.40) Moderate persistent asthma, unspecified whether complicated  Comment:  Controlled no change in treatment plan  Plan: montelukast (SINGULAIR) 10 MG tablet      (G62.9) Neuropathy  Comment:  Controlled no change in treatment plan  Plan: gabapentin (NEURONTIN) 300 MG capsule      (J45.20) Mild intermittent asthma without complication  Comment: due to cost of qvar will attempt alterative inhalers   Plan: albuterol (PROAIR HFA/PROVENTIL HFA/VENTOLIN         HFA) 108 (90 Base) MCG/ACT inhaler, ciclesonide        (ALVESCO) 80 MCG/ACT inhaler      (M1A.9XX0) Chronic gout without tophus, unspecified cause, unspecified site  Comment:  Controlled no change in treatment plan  Plan: allopurinol (ZYLOPRIM) 300 MG tablet      (I47.1) SVT (supraventricular tachycardia) (H)  Comment:  Controlled  "no change in treatment plan  Plan:     (E66.01) Morbid obesity (H)  Comment: Review diet and working out   Plan:   No follow-ups on file.    BLAISE Quinn CNP  [facility]    Ana Andrew is a 46 year old who presents for the following health issues     HPI     Hypertension Follow-up      Do you check your blood pressure regularly outside of the clinic? Yes     Are you following a low salt diet? Yes    Are your blood pressures ever more than 140 on the top number (systolic) OR more   than 90 on the bottom number (diastolic), for example 140/90? No        Review of Systems   Constitutional, HEENT, cardiovascular, pulmonary, gi and gu systems are negative, except as otherwise noted.      Objective    /80 (BP Location: Right arm, Cuff Size: Adult Large)   Pulse 91   Temp 97.9  F (36.6  C) (Tympanic)   Ht 1.899 m (6' 2.75\")   Wt 146.5 kg (323 lb)   SpO2 95%   BMI 40.64 kg/m    There is no height or weight on file to calculate BMI.  Physical Exam   GENERAL: healthy, alert and no distress  EYES: Eyes grossly normal to inspection, PERRL and conjunctivae and sclerae normal  HENT: ear canals and TM's normal, nose and mouth without ulcers or lesions  NECK: no adenopathy, no asymmetry, masses, or scars and thyroid normal to palpation  RESP: lungs clear to auscultation - no rales, rhonchi or wheezes  CV: regular rate and rhythm, normal S1 S2, no S3 or S4, no murmur, click or rub, no peripheral edema and peripheral pulses strong  ABDOMEN: soft, nontender, no hepatosplenomegaly, no masses and bowel sounds normal  MS: no gross musculoskeletal defects noted, no edema  SKIN: no suspicious lesions or rashes  NEURO: Normal strength and tone, mentation intact and speech normal  PSYCH: mentation appears normal, affect normal/bright    No results found for any visits on 02/07/22.            "

## 2022-04-05 ENCOUNTER — TELEPHONE (OUTPATIENT)
Dept: FAMILY MEDICINE | Facility: CLINIC | Age: 47
End: 2022-04-05
Payer: COMMERCIAL

## 2022-04-07 ENCOUNTER — TELEPHONE (OUTPATIENT)
Dept: FAMILY MEDICINE | Facility: CLINIC | Age: 47
End: 2022-04-07
Payer: COMMERCIAL

## 2022-04-07 ASSESSMENT — ASTHMA QUESTIONNAIRES: ACT_TOTALSCORE: 21

## 2022-04-07 NOTE — TELEPHONE ENCOUNTER
Patient Quality Outreach    Patient is due for the following:   Asthma  -  ACT needed    NEXT STEPS:   Patient needs to complete an ACT.    Type of outreach:    Phone, spoke to patient/parent. ACT score was 22.      Questions for provider review:    None     Domenica Dejesus, CMA

## 2022-04-13 ENCOUNTER — TRANSFERRED RECORDS (OUTPATIENT)
Dept: HEALTH INFORMATION MANAGEMENT | Facility: CLINIC | Age: 47
End: 2022-04-13
Payer: COMMERCIAL

## 2022-04-14 ENCOUNTER — TELEPHONE (OUTPATIENT)
Dept: FAMILY MEDICINE | Facility: CLINIC | Age: 47
End: 2022-04-14
Payer: COMMERCIAL

## 2022-04-14 NOTE — TELEPHONE ENCOUNTER
Reason for Call: Request for an order or referral: Pre-op covid pcr test     Order or referral being requested: Covid PCR Pre-op orders    Date needed: as soon as possible    Has the patient been seen by the PCP for this problem? Unsure - Kamran has his pre-op physical scheduled with Magda Almanza on 4/21 but he would like to schedule the test as soon as possible. His procedure date is 5/6/22.     Additional comments: Kamran said that [facility] sent over orders for the test but there are no active requests in his chart for one.     Phone number Patient can be reached at:  Cell number on file:    Telephone Information:   Mobile [phone]       Best Time:  Any time    Can we leave a detailed message on this number?  YES    Call taken on 4/14/2022 at 2:02 PM by Mariola Gillis

## 2022-04-14 NOTE — TELEPHONE ENCOUNTER
Appt made for 5/3/2022, covid test pre-op at [place], awaiting order from [facility].   Alycia Walter RN

## 2022-04-21 ENCOUNTER — OFFICE VISIT (OUTPATIENT)
Dept: FAMILY MEDICINE | Facility: CLINIC | Age: 47
End: 2022-04-21
Payer: COMMERCIAL

## 2022-04-21 VITALS
RESPIRATION RATE: 30 BRPM | SYSTOLIC BLOOD PRESSURE: 134 MMHG | WEIGHT: 315 LBS | DIASTOLIC BLOOD PRESSURE: 83 MMHG | OXYGEN SATURATION: 97 % | BODY MASS INDEX: 39.17 KG/M2 | HEART RATE: 64 BPM | HEIGHT: 75 IN | TEMPERATURE: 97.7 F

## 2022-04-21 DIAGNOSIS — Z01.818 PREOP GENERAL PHYSICAL EXAM: Primary | ICD-10-CM

## 2022-04-21 DIAGNOSIS — M67.432 GANGLION CYST OF WRIST, LEFT: ICD-10-CM

## 2022-04-21 LAB
ANION GAP SERPL CALCULATED.3IONS-SCNC: 7 MMOL/L (ref 3–14)
BUN SERPL-MCNC: 15 MG/DL (ref 7–30)
CALCIUM SERPL-MCNC: 8.3 MG/DL (ref 8.5–10.1)
CHLORIDE BLD-SCNC: 103 MMOL/L (ref 94–109)
CO2 SERPL-SCNC: 27 MMOL/L (ref 20–32)
CREAT SERPL-MCNC: 0.88 MG/DL (ref 0.66–1.25)
ERYTHROCYTE [DISTWIDTH] IN BLOOD BY AUTOMATED COUNT: 13.9 % (ref 10–15)
GFR SERPL CREATININE-BSD FRML MDRD: >90 ML/MIN/1.73M2
GLUCOSE BLD-MCNC: 83 MG/DL (ref 70–99)
HCT VFR BLD AUTO: 49.1 % (ref 40–53)
HGB BLD-MCNC: 16.4 G/DL (ref 13.3–17.7)
MCH RBC QN AUTO: 30.9 PG (ref 26.5–33)
MCHC RBC AUTO-ENTMCNC: 33.4 G/DL (ref 31.5–36.5)
MCV RBC AUTO: 93 FL (ref 78–100)
PLATELET # BLD AUTO: 251 10E3/UL (ref 150–450)
POTASSIUM BLD-SCNC: 4 MMOL/L (ref 3.4–5.3)
RBC # BLD AUTO: 5.31 10E6/UL (ref 4.4–5.9)
SODIUM SERPL-SCNC: 137 MMOL/L (ref 133–144)
WBC # BLD AUTO: 8.3 10E3/UL (ref 4–11)

## 2022-04-21 PROCEDURE — 80048 BASIC METABOLIC PNL TOTAL CA: CPT | Performed by: NURSE PRACTITIONER

## 2022-04-21 PROCEDURE — 85027 COMPLETE CBC AUTOMATED: CPT | Performed by: NURSE PRACTITIONER

## 2022-04-21 PROCEDURE — 99214 OFFICE O/P EST MOD 30 MIN: CPT | Performed by: NURSE PRACTITIONER

## 2022-04-21 PROCEDURE — 36415 COLL VENOUS BLD VENIPUNCTURE: CPT | Performed by: NURSE PRACTITIONER

## 2022-04-21 NOTE — PROGRESS NOTES
[facility]  [address] [place] [id number]  Phone: [phone]  Fax: [phone]  Primary Provider: [facility]        PREOPERATIVE EVALUATION:  Today's date: 4/21/2022    Mark Conn is a 46 year old male who presents for a preoperative evaluation.    Surgical Information:  Surgery/Procedure: left wrist ganglion cyst removal  Surgery Location: [facility]  Surgeon: Dr. Turcios  Surgery Date: 5/6/2022  Time of Surgery: tbd  Where patient plans to recover: At home with family  Fax number for surgical facility: [phone]    Type of Anesthesia Anticipated: Choice    Assessment & Plan     The proposed surgical procedure is considered LOW risk.    (Z01.818) Preop general physical exam  (primary encounter diagnosis)  Comment:   Plan: Asymptomatic COVID-19 Virus (Coronavirus) by         PCR, Basic metabolic panel  (Ca, Cl, CO2,         Creat, Gluc, K, Na, BUN), CBC with platelets      (M67.432) Ganglion cyst of wrist, left  Comment:   Plan:          Risks and Recommendations:  The patient has the following additional risks and recommendations for perioperative complications:   - No identified additional risk factors other than previously addressed    Medication Instructions:   - ibuprofen (Advil, Motrin): HOLD 1 day before surgery.     RECOMMENDATION:  APPROVAL GIVEN to proceed with proposed procedure, without further diagnostic evaluation.        Subjective     HPI related to upcoming procedure: left wrist ganglion cyst removal    Preop Questions 4/20/2022   1. Have you ever had a heart attack or stroke? No   2. Have you ever had surgery on your heart or blood vessels, such as a stent placement, a coronary artery bypass, or surgery on an artery in your head, neck, heart, or legs? No   3. Do you have chest pain with activity? No   4. Do you have a history of  heart failure? No   5. Do you currently have a cold, bronchitis or  symptoms of other infection? No   6. Do you have a cough, shortness of breath, or wheezing? No   7. Do you or anyone in your family have previous history of blood clots? No   8. Do you or does anyone in your family have a serious bleeding problem such as prolonged bleeding following surgeries or cuts? No   9. Have you ever had problems with anemia or been told to take iron pills? No   10. Have you had any abnormal blood loss such as black, tarry or bloody stools? No   11. Have you ever had a blood transfusion? No   12. Are you willing to have a blood transfusion if it is medically needed before, during, or after your surgery? Yes   13. Have you or any of your relatives ever had problems with anesthesia? No   14. Do you have sleep apnea, excessive snoring or daytime drowsiness? No   15. Do you have any artifical heart valves or other implanted medical devices like a pacemaker, defibrillator, or continuous glucose monitor? No   16. Do you have artificial joints? No   17. Are you allergic to latex? No       Health Care Directive:  Patient does not have a Health Care Directive or Living Will: Discussed advance care planning with patient; however, patient declined at this time.    Preoperative Review of :   reviewed - no record of controlled substances prescribed.      Status of Chronic Conditions:  See problem list for active medical problems.  Problems all longstanding and stable, except as noted/documented.  See ROS for pertinent symptoms related to these conditions.      Review of Systems  Constitutional, neuro, ENT, endocrine, pulmonary, cardiac, gastrointestinal, genitourinary, musculoskeletal, integument and psychiatric systems are negative, except as otherwise noted.    Patient Active Problem List    Diagnosis Date Noted     SVT (supraventricular tachycardia) (H) 12/21/2021     Priority: Medium     Obesity (BMI 35.0-39.9) with comorbidity (H) 10/02/2019     Priority: Medium     Moderate persistent asthma,  unspecified whether complicated 04/26/2018     Priority: Medium     Hypoglycemia 04/26/2018     Priority: Medium     Restrictive lung disease 08/30/2017     Priority: Medium     See PFT August, 2017.        Chronic gout without tophus, unspecified cause, unspecified site 09/07/2016     Priority: Medium     Obesity 03/06/2015     Priority: Medium     Benign essential hypertension 03/23/2011     Priority: Medium     CARDIOVASCULAR SCREENING; LDL GOAL LESS THAN 130 10/31/2010     Priority: Medium     Hypertriglyceridemia 01/17/2010     Priority: Medium     249, Jan, 2010. 755 in Sept, 2012 and Lopid started.        Malabsorption of glucose 01/17/2010     Priority: Medium     108, Jan, 2010.   (Problem list name updated by automated process. Provider to review and confirm.)       Colitis 10/12/2009     Priority: Medium     Colonoscopy 10-12-09       Pain in limb 10/17/2006     Priority: Medium     ?lyme disease, initial screen interdeterminate, with negative WB, better on doxycycline  Repeating test in end of Nov        Past Medical History:   Diagnosis Date     Other acne      Past Surgical History:   Procedure Laterality Date     SURGICAL HISTORY OF -       Appendectomy     SURGICAL HISTORY OF -   age 2    Hernia - Right Inguinal     SURGICAL HISTORY OF -   age 3    Pectoral-Sternum Repair      SURGICAL HISTORY OF -   08/12/85    Tonsillectomy     Current Outpatient Medications   Medication Sig Dispense Refill     albuterol (PROAIR HFA/PROVENTIL HFA/VENTOLIN HFA) 108 (90 Base) MCG/ACT inhaler Inhale 2 puffs into the lungs every 6 hours as needed for shortness of breath / dyspnea or wheezing 18 g 3     allopurinol (ZYLOPRIM) 300 MG tablet Take 1 tablet (300 mg) by mouth daily 90 tablet 3     ciclesonide (ALVESCO) 80 MCG/ACT inhaler Inhale 1 puff into the lungs 2 times daily 6.1 g 0     gabapentin (NEURONTIN) 300 MG capsule Take 1 capsule (300 mg) by mouth At Bedtime 90 capsule 1     gemfibrozil (LOPID) 600 MG tablet  "Take 1 tablet (600 mg) by mouth 2 times daily (before meals) 180 tablet 3     lisinopril (ZESTRIL) 20 MG tablet Take 1 tablet (20 mg) by mouth daily 90 tablet 3     metoprolol succinate ER (TOPROL-XL) 50 MG 24 hr tablet Take 1 tablet (50 mg) by mouth daily 90 tablet 3     montelukast (SINGULAIR) 10 MG tablet Take 1 tablet (10 mg) by mouth At Bedtime 90 tablet 3     multivitamin w/minerals (THERA-VIT-M) tablet Take 1 tablet by mouth 2 times daily.       Omega-3 Fatty Acids (OMEGA-3 FISH OIL PO)          Allergies   Allergen Reactions     Ampicillin Rash     Codeine Rash     Erythromycin Rash     Keflex [Cephalexin Monohydrate] Rash     Penicillins Rash     Sulfa Drugs Rash        Social History     Tobacco Use     Smoking status: Never Smoker     Smokeless tobacco: Never Used   Substance Use Topics     Alcohol use: Yes     Comment: Sociallly- not very often.     Family History   Problem Relation Age of Onset     Hypertension Mother      Lipids Mother      Diabetes Mother      Thyroid Disease Father      Other Cancer Father 65        Gastric-spread to the bones, liver.     Diabetes Maternal Grandmother      Hypertension Maternal Grandfather      Diabetes Paternal Grandmother      Thyroid Disease Sister         Hyperthyroid.     History   Drug Use No         Objective     /83   Pulse 64   Temp 97.7  F (36.5  C)   Resp 30   Ht 1.892 m (6' 2.5\")   Wt 143.6 kg (316 lb 9.6 oz)   SpO2 97%   BMI 40.11 kg/m      Physical Exam    GENERAL APPEARANCE: healthy, alert and no distress     EYES: EOMI, PERRL     HENT: ear canals and TM's normal and nose and mouth without ulcers or lesions     NECK: no adenopathy, no asymmetry, masses, or scars and thyroid normal to palpation     RESP: lungs clear to auscultation - no rales, rhonchi or wheezes     CV: regular rates and rhythm, normal S1 S2, no S3 or S4 and no murmur, click or rub     MS: extremities normal- no gross deformities noted, no evidence of inflammation in " joints, FROM in all extremities.     SKIN: no suspicious lesions or rashes cyst to left wrist      NEURO: Normal strength and tone, sensory exam grossly normal, mentation intact and speech normal     PSYCH: mentation appears normal. and affect normal/bright     LYMPHATICS: No cervical adenopathy    Recent Labs   Lab Test 02/07/22  0757   HGB 16.9         POTASSIUM 4.5   CR 0.91        Diagnostics:  Recent Results (from the past 48 hour(s))   Basic metabolic panel  (Ca, Cl, CO2, Creat, Gluc, K, Na, BUN)    Collection Time: 04/21/22  4:50 PM   Result Value Ref Range    Sodium 137 133 - 144 mmol/L    Potassium 4.0 3.4 - 5.3 mmol/L    Chloride 103 94 - 109 mmol/L    Carbon Dioxide (CO2) 27 20 - 32 mmol/L    Anion Gap 7 3 - 14 mmol/L    Urea Nitrogen 15 7 - 30 mg/dL    Creatinine 0.88 0.66 - 1.25 mg/dL    Calcium 8.3 (L) 8.5 - 10.1 mg/dL    Glucose 83 70 - 99 mg/dL    GFR Estimate >90 >60 mL/min/1.73m2   CBC with platelets    Collection Time: 04/21/22  4:50 PM   Result Value Ref Range    WBC Count 8.3 4.0 - 11.0 10e3/uL    RBC Count 5.31 4.40 - 5.90 10e6/uL    Hemoglobin 16.4 13.3 - 17.7 g/dL    Hematocrit 49.1 40.0 - 53.0 %    MCV 93 78 - 100 fL    MCH 30.9 26.5 - 33.0 pg    MCHC 33.4 31.5 - 36.5 g/dL    RDW 13.9 10.0 - 15.0 %    Platelet Count 251 150 - 450 10e3/uL      No EKG required, no history of coronary heart disease, significant arrhythmia, peripheral arterial disease or other structural heart disease.    Revised Cardiac Risk Index (RCRI):  The patient has the following serious cardiovascular risks for perioperative complications:   - No serious cardiac risks = 0 points     RCRI Interpretation: 0 points: Class I (very low risk - 0.4% complication rate)           Signed Electronically by: BLAISE Quinn CNP  Copy of this evaluation report is provided to requesting physician.

## 2022-04-21 NOTE — PATIENT INSTRUCTIONS
Preparing for Your Surgery  Getting started  A nurse will call you to review your health history and instructions. They will give you an arrival time based on your scheduled surgery time. Please be ready to share:    Your doctor's clinic name and phone number    Your medical, surgical and anesthesia history    A list of allergies and sensitivities    A list of medicines, including herbal treatments and over-the-counter drugs    Whether the patient has a legal guardian (ask how to send us the papers in advance)  Please tell us if you're pregnant--or if there's any chance you might be pregnant. Some surgeries may injure a fetus (unborn baby), so they require a pregnancy test. Surgeries that are safe for a fetus don't always need a test, and you can choose whether to have one.   If you have a child who's having surgery, please ask for a copy of Preparing for Your Child's Surgery.    Preparing for surgery    Within 30 days of surgery: Have a pre-op exam (sometimes called an H&P, or History and Physical). This can be done at a clinic or pre-operative center.  ? If you're having a , you may not need this exam. Talk to your care team.    At your pre-op exam, talk to your care team about all medicines you take. If you need to stop any medicines before surgery, ask when to start taking them again.  ? We do this for your safety. Many medicines can make you bleed too much during surgery. Some change how well surgery (anesthesia) drugs work.    Call your insurance company to let them know you're having surgery. (If you don't have insurance, call [phone].)    Call your clinic if there's any change in your health. This includes signs of a cold or flu (sore throat, runny nose, cough, rash, fever). It also includes a scrape or scratch near the surgery site.    If you have questions on the day of surgery, call your hospital or surgery center.  COVID testing  You may need to be tested for COVID-19 before having  surgery. If so, your surgical team will give you instructions for scheduling this test, separate from your preoperative history and physical.  Eating and drinking guidelines  For your safety: Unless your surgeon tells you otherwise, follow the guidelines below.    Eat and drink as usual until 8 hours before surgery. After that, no food or milk.    Drink clear liquids until 2 hours before surgery. These are liquids you can see through, like water, Gatorade and Propel Water. You may also have black coffee and tea (no cream or milk).    Nothing by mouth within 2 hours of surgery. This includes gum, candy and breath mints.    If you drink alcohol: Stop drinking it the night before surgery.    If your care team tells you to take medicine on the morning of surgery, it's okay to take it with a sip of water.  Preventing infection    Shower or bathe the night before and morning of your surgery. Follow the instructions your clinic gave you. (If no instructions, use regular soap.)    Don't shave or clip hair near your surgery site. We'll remove the hair if needed.    Don't smoke or vape the morning of surgery. You may chew nicotine gum up to 2 hours before surgery. A nicotine patch is okay.  ? Note: Some surgeries require you to completely quit smoking and nicotine. Check with your surgeon.    Your care team will make every effort to keep you safe from infection. We will:  ? Clean our hands often with soap and water (or an alcohol-based hand rub).  ? Clean the skin at your surgery site with a special soap that kills germs.  ? Give you a special gown to keep you warm. (Cold raises the risk of infection.)  ? Wear special hair covers, masks, gowns and gloves during surgery.  ? Give antibiotic medicine, if prescribed. Not all surgeries need antibiotics.  What to bring on the day of surgery    Photo ID and insurance card    Copy of your health care directive, if you have one    Glasses and hearing aides (bring cases)  ? You can't  wear contacts during surgery    Inhaler and eye drops, if you use them (tell us about these when you arrive)    CPAP machine or breathing device, if you use them    A few personal items, if spending the night    If you have . . .  ? A pacemaker, ICD (cardiac defibrillator) or other implant: Bring the ID card.  ? An implanted stimulator: Bring the remote control.  ? A legal guardian: Bring a copy of the certified (court-stamped) guardianship papers.  Please remove any jewelry, including body piercings. Leave jewelry and other valuables at home.  If you're going home the day of surgery    You must have a responsible adult drive you home. They should stay with you overnight as well.    If you don't have someone to stay with you, and you aren't safe to go home alone, we may keep you overnight. Insurance often won't pay for this.  After surgery  If it's hard to control your pain or you need more pain medicine, please call your surgeon's office.  Questions?   If you have any questions for your care team, list them here: _________________________________________________________________________________________________________________________________________________________________________ ____________________________________ ____________________________________ ____________________________________  For informational purposes only. Not to replace the advice of your health care provider. Copyright   2003, 2019 [facility]. All rights reserved. Clinically reviewed by Cornelia Quintanilla MD. [employer] [id number] - REV 07/21.

## 2022-05-03 ENCOUNTER — LAB (OUTPATIENT)
Dept: LAB | Facility: CLINIC | Age: 47
End: 2022-05-03
Payer: COMMERCIAL

## 2022-05-03 DIAGNOSIS — Z01.818 PREOP GENERAL PHYSICAL EXAM: ICD-10-CM

## 2022-05-03 PROCEDURE — U0003 INFECTIOUS AGENT DETECTION BY NUCLEIC ACID (DNA OR RNA); SEVERE ACUTE RESPIRATORY SYNDROME CORONAVIRUS 2 (SARS-COV-2) (CORONAVIRUS DISEASE [COVID-19]), AMPLIFIED PROBE TECHNIQUE, MAKING USE OF HIGH THROUGHPUT TECHNOLOGIES AS DESCRIBED BY CMS-2020-01-R: HCPCS

## 2022-05-03 PROCEDURE — U0005 INFEC AGEN DETEC AMPLI PROBE: HCPCS

## 2022-05-04 LAB — SARS-COV-2 RNA RESP QL NAA+PROBE: NEGATIVE

## 2022-09-19 DIAGNOSIS — G62.9 NEUROPATHY: ICD-10-CM

## 2022-09-19 RX ORDER — GABAPENTIN 300 MG/1
CAPSULE ORAL
Qty: 90 CAPSULE | Refills: 1 | Status: SHIPPED | OUTPATIENT
Start: 2022-09-19 | End: 2023-02-28

## 2022-09-19 NOTE — TELEPHONE ENCOUNTER
Requested Prescriptions   Pending Prescriptions Disp Refills    gabapentin (NEURONTIN) 300 MG capsule [Pharmacy Med Name: GABAPENTIN 300MG CAPS] 90 capsule 1     Sig: TAKE ONE CAPSULE BY MOUTH AT BEDTIME        There is no refill protocol information for this order

## 2022-11-05 ENCOUNTER — APPOINTMENT (OUTPATIENT)
Dept: GENERAL RADIOLOGY | Facility: CLINIC | Age: 47
End: 2022-11-05
Attending: PHYSICIAN ASSISTANT
Payer: COMMERCIAL

## 2022-11-05 ENCOUNTER — HOSPITAL ENCOUNTER (EMERGENCY)
Facility: CLINIC | Age: 47
Discharge: HOME | End: 2022-11-05
Attending: PHYSICIAN ASSISTANT | Admitting: PHYSICIAN ASSISTANT
Payer: COMMERCIAL

## 2022-11-05 VITALS
HEART RATE: 68 BPM | TEMPERATURE: 97.8 F | SYSTOLIC BLOOD PRESSURE: 129 MMHG | RESPIRATION RATE: 16 BRPM | DIASTOLIC BLOOD PRESSURE: 73 MMHG | OXYGEN SATURATION: 98 %

## 2022-11-05 DIAGNOSIS — M25.562 LEFT KNEE PAIN: ICD-10-CM

## 2022-11-05 PROCEDURE — G0463 HOSPITAL OUTPT CLINIC VISIT: HCPCS | Performed by: PHYSICIAN ASSISTANT

## 2022-11-05 PROCEDURE — 99214 OFFICE O/P EST MOD 30 MIN: CPT | Performed by: PHYSICIAN ASSISTANT

## 2022-11-05 PROCEDURE — 73560 X-RAY EXAM OF KNEE 1 OR 2: CPT | Mod: LT

## 2022-11-05 RX ORDER — HYDROCODONE BITARTRATE AND ACETAMINOPHEN 5; 325 MG/1; MG/1
1 TABLET ORAL EVERY 6 HOURS PRN
Qty: 8 TABLET | Refills: 0 | Status: SHIPPED | OUTPATIENT
Start: 2022-11-05 | End: 2022-11-08

## 2022-11-05 ASSESSMENT — ENCOUNTER SYMPTOMS: CONSTITUTIONAL NEGATIVE: 1

## 2022-11-05 ASSESSMENT — ACTIVITIES OF DAILY LIVING (ADL): ADLS_ACUITY_SCORE: 35

## 2022-11-05 NOTE — ED PROVIDER NOTES
"  History     Chief Complaint   Patient presents with     Knee Pain     HPI  Mark Conn is a 47 year old male who presents with complaints of left knee pain for the past month and a half.  Patient denies any specific preceding injury or trauma to the knee.  Patient states his knee pain is medially located.  He denies associated swelling or skin changes.  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  His pain is worse with weightbearing and squatting.  He denies any locking, catching, or giving out sensation.  He states it feels like there is a \"fluid pocket\" in his knee \"that needs to be popped.\"  Denies fevers or chills.      Allergies:  Allergies   Allergen Reactions     Ampicillin Rash     Codeine Rash     Erythromycin Rash     Keflex [Cephalexin Monohydrate] Rash     Penicillins Rash     Sulfa Drugs Rash       Problem List:    Patient Active Problem List    Diagnosis Date Noted     SVT (supraventricular tachycardia) (H) 12/21/2021     Priority: Medium     Obesity (BMI 35.0-39.9) with comorbidity (H) 10/02/2019     Priority: Medium     Moderate persistent asthma, unspecified whether complicated 04/26/2018     Priority: Medium     Hypoglycemia 04/26/2018     Priority: Medium     Restrictive lung disease 08/30/2017     Priority: Medium     See PFT August, 2017.        Chronic gout without tophus, unspecified cause, unspecified site 09/07/2016     Priority: Medium     Obesity 03/06/2015     Priority: Medium     Benign essential hypertension 03/23/2011     Priority: Medium     CARDIOVASCULAR SCREENING; LDL GOAL LESS THAN 130 10/31/2010     Priority: Medium     Hypertriglyceridemia 01/17/2010     Priority: Medium     249, Jan, 2010. 755 in Sept, 2012 and Lopid started.        Malabsorption of glucose 01/17/2010     Priority: Medium     108, Jan, 2010.   (Problem list name updated by automated process. Provider to review and confirm.)       Colitis 10/12/2009     Priority: Medium     " Colonoscopy 10-12-09       Pain in limb 10/17/2006     Priority: Medium     ?lyme disease, initial screen interdeterminate, with negative WB, better on doxycycline  Repeating test in end of Nov          Past Medical History:    Past Medical History:   Diagnosis Date     Other acne        Past Surgical History:    Past Surgical History:   Procedure Laterality Date     SURGICAL HISTORY OF -       Appendectomy     SURGICAL HISTORY OF -   age 2    Hernia - Right Inguinal     SURGICAL HISTORY OF -   age 3    Pectoral-Sternum Repair      SURGICAL HISTORY OF -   08/12/85    Tonsillectomy       Family History:    Family History   Problem Relation Age of Onset     Hypertension Mother      Lipids Mother      Diabetes Mother      Thyroid Disease Father      Other Cancer Father 65        Gastric-spread to the bones, liver.     Diabetes Maternal Grandmother      Hypertension Maternal Grandfather      Diabetes Paternal Grandmother      Thyroid Disease Sister         Hyperthyroid.       Social History:  Marital Status:   [2]  Social History     Tobacco Use     Smoking status: Never     Smokeless tobacco: Never   Vaping Use     Vaping Use: Never used   Substance Use Topics     Alcohol use: Yes     Comment: Sociallly- not very often.     Drug use: No        Medications:    HYDROcodone-acetaminophen (NORCO) 5-325 MG tablet  albuterol (PROAIR HFA/PROVENTIL HFA/VENTOLIN HFA) 108 (90 Base) MCG/ACT inhaler  allopurinol (ZYLOPRIM) 300 MG tablet  ciclesonide (ALVESCO) 80 MCG/ACT inhaler  gabapentin (NEURONTIN) 300 MG capsule  gemfibrozil (LOPID) 600 MG tablet  lisinopril (ZESTRIL) 20 MG tablet  metoprolol succinate ER (TOPROL-XL) 50 MG 24 hr tablet  montelukast (SINGULAIR) 10 MG tablet  multivitamin w/minerals (THERA-VIT-M) tablet  Omega-3 Fatty Acids (OMEGA-3 FISH OIL PO)          Review of Systems   Constitutional: Negative.    Musculoskeletal:        Left knee pain   Skin: Negative.    All other systems reviewed and are  negative.      Physical Exam   BP: 129/73  Pulse: 68  Temp: 97.8  F (36.6  C)  Resp: 16  SpO2: 98 %      Physical Exam  Constitutional:       General: He is not in acute distress.     Appearance: Normal appearance. He is well-developed. He is not ill-appearing, toxic-appearing or diaphoretic.   HENT:      Head: Normocephalic and atraumatic.   Eyes:      Conjunctiva/sclera: Conjunctivae normal.   Cardiovascular:      Pulses: Normal pulses.   Pulmonary:      Effort: Pulmonary effort is normal.   Musculoskeletal:      Cervical back: Neck supple.      Left upper leg: Normal.      Left knee: No swelling, deformity, effusion, erythema, ecchymosis, lacerations or crepitus. Decreased range of motion. Tenderness present over the medial joint line and MCL. Normal alignment, normal meniscus and normal patellar mobility.      Left lower leg: Normal.   Skin:     General: Skin is warm and dry.      Capillary Refill: Capillary refill takes less than 2 seconds.   Neurological:      General: No focal deficit present.      Mental Status: He is alert.      Sensory: Sensation is intact.      Motor: Motor function is intact.         ED Course                 Procedures    Results for orders placed or performed during the hospital encounter of 11/05/22 (from the past 24 hour(s))   XR Knee Left 1/2 Views    Narrative    EXAM: XR KNEE LEFT 1/2 VIEWS  LOCATION: [facility]  DATE/TIME: 11/5/2022 4:50 PM    INDICATION: Medial knee pain, no known trauma.  COMPARISON: None.      Impression    IMPRESSION: Negative left knee radiographs. Normal joint spaces and alignment. No fracture or joint effusion.       Medications - No data to display    Assessments & Plan (with Medical Decision Making)     Pt is a 47 year old male who presents with complaints of left knee pain for the past month and a half.  Patient denies any specific preceding injury or trauma to the knee.  Patient states his knee pain is medially located.  He  "denies associated swelling or skin changes.  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  His pain is worse with weightbearing and squatting.  He denies any locking, catching, or giving out sensation.  He states it feels like there is a \"fluid pocket\" in his knee \"that needs to be popped.\"  Denies fevers or chills.    Pt is afebrile on arrival.  Exam as above.  X-rays of left knee were negative for malalignment, joint space abnormality, or effusion.  Discussed results with patient.  Encouraged symptomatic treatments at home.  Orthopedic referral placed.  Return precautions were reviewed.  Hand-outs were provided.    Patient was instructed to follow-up with orthopedics for continued care and management.  He is to return to the ED for persistent and/or worsening symptoms.  Patient expressed understanding of the diagnosis and plan and was discharged home in good condition.    I have reviewed the nursing notes.    I have reviewed the findings, diagnosis, plan and need for follow up with the patient.    Discharge Medication List as of 11/5/2022  5:15 PM      START taking these medications    Details   HYDROcodone-acetaminophen (NORCO) 5-325 MG tablet Take 1 tablet by mouth every 6 hours as needed for severe pain, Disp-8 tablet, R-0, Local Print             Final diagnoses:   Left knee pain       11/5/2022   [facility] EMERGENCY DEPT      Disclaimer:  This note consists of symbols derived from keyboarding, dictation and/or voice recognition software.  As a result, there may be errors in the script that have gone undetected.  Please consider this when interpreting information found in this chart.     Ana Echevarria PA-C  11/05/22 2050    "

## 2022-11-19 ENCOUNTER — HEALTH MAINTENANCE LETTER (OUTPATIENT)
Age: 47
End: 2022-11-19

## 2022-11-21 ENCOUNTER — OFFICE VISIT (OUTPATIENT)
Dept: ORTHOPEDICS | Facility: CLINIC | Age: 47
End: 2022-11-21
Attending: PHYSICIAN ASSISTANT
Payer: COMMERCIAL

## 2022-11-21 VITALS — SYSTOLIC BLOOD PRESSURE: 122 MMHG | HEIGHT: 75 IN | DIASTOLIC BLOOD PRESSURE: 80 MMHG | BODY MASS INDEX: 40.11 KG/M2

## 2022-11-21 DIAGNOSIS — M25.562 CHRONIC PAIN OF LEFT KNEE: ICD-10-CM

## 2022-11-21 DIAGNOSIS — G89.29 CHRONIC PAIN OF LEFT KNEE: ICD-10-CM

## 2022-11-21 DIAGNOSIS — M17.12 ARTHRITIS OF LEFT KNEE: Primary | ICD-10-CM

## 2022-11-21 PROCEDURE — 99204 OFFICE O/P NEW MOD 45 MIN: CPT | Performed by: FAMILY MEDICINE

## 2022-11-21 NOTE — PROGRESS NOTES
ASSESSMENT & PLAN    Mark was seen today for pain.    Diagnoses and all orders for this visit:    Left knee pain  -     Orthopedic  Referral      This issue is acute on chronic and Worsening.     # Left Knee Arthritis: Mark Conn  was seen today for acute on chronic left knee pain. Symptoms had been going on for 4 + weeks. On examination there are positive findings of pain with associated tenderness to palpation over the medial. Imaging findings showed mild medial knee arthritis on x-rays and previous left knee MRI. Likely cause of patient's condition due to flare of mild knee arthritis . Other possible conditions contributing to symptoms include possible meniscal tear but treatment would be the same.  Counseled patient on nature of condition and treatment options.  Given this plan as below, follow-up as needed     Image Findings: mild knee arthritis over the medial joint line on x-rays and previous MRI  Treatment: Activities as tolerated, home exercises given today, over-the-counter knee brace  Job as tolerated    Medications/Injections: Limited tylenol/ibuprofen for pain for 1-2 weeks, Topical Voltaren gel, defer for now  Follow-up: In one month if symptoms do not improve, sooner if worsening  Can consider steroid injection if pain flares    Bull Echols MD  [facility] [place]    -----  Chief Complaint   Patient presents with     Left Knee - Pain       SUBJECTIVE  Mark Conn is a/an 47 year old male who is seen in consultation at the request of  Ana Echevarria PA-C for evaluation of left knee pain.     The patient is seen by themselves.       Onset: 1 month(s) ago. Reports insidious onset without acute precipitating event. Seen in ED 11/5/22 and xrays were performed. Reviewed ER note, left knee x-rays and MRI  Location of Pain: left medial knee pain  Worsened by: prolonged sitting and standing, driving, sleeping    Better with: heat  Treatments tried:  "ice, heat, ibuprofen, Norco   Associated symptoms: cracking     Orthopedic/Surgical history: Yes- left knee MRI 5/5/2016  Social History/Occupation: construction     No family history pertinent to patient's problem today.      REVIEW OF SYSTEMS:  Review of Systems  Constitutional, HEENT, cardiovascular, pulmonary, GI, , musculoskeletal, neuro, skin, endocrine and psych systems are negative, except as otherwise noted.    OBJECTIVE:  Ht 1.892 m (6' 2.5\")   BMI 40.11 kg/m     General: healthy, alert and in no distress  HEENT: no scleral icterus or conjunctival erythema  Skin: no suspicious lesions or rash. No jaundice.  CV: distal perfusion intact    Resp: normal respiratory effort without conversational dyspnea   Psych: normal mood and affect  Gait: normal steady gait with appropriate coordination and balance    Neuro: Normal light sensory exam of left lower extremity    Ortho Exam   LEFT KNEE  Inspection:    Normal alignment; no edema, erythema, or ecchymosis present  Palpation:    Tender about the medial joint line. Remainder of bony and ligamentous landmarks are nontender.    Mild effusion is present    Patellofemoral crepitus is Absent  Range of Motion:     00 extension to 1350 flexion  Strength:    Quadriceps 5/5, hamstrings 5/5, gastrocsoleus 5/5 and tibialis anterior 5/5    Extensor mechanism intact  Special Tests:    Positive: None    Negative: Patellar grind, MCL/valgus stress (0 & 30 deg), LCL/varus stress (0 & 30 deg), Lachman's, Latha's      RADIOLOGY:  I independently, visualized and reviewed these images with the patient      XR FOOT BILATERAL G/E 3 VIEWS 12/21/2021 1:26 PM      HISTORY: Pain in both feet; Pain in both feet                                                                      IMPRESSION: Bilateral calcaneal spurs. No calcaneal erosions.  Otherwise unremarkable foot radiographs.     TRISTIN CHASE MD      INDICATION: Medial knee pain, no known trauma.  COMPARISON: None.               "                                                        IMPRESSION: Negative left knee radiographs. Normal joint spaces and alignment. No fracture or joint effusion.    Review of external notes as documented elsewhere in note  Review of the result(s) of each unique test - left knee x-rays and MRI       Disclaimer: This note consists of symbols derived from keyboarding, dictation and/or voice recognition software. As a result, there may be errors in the script that have gone undetected. Please consider this when interpreting information found in this chart.

## 2022-11-21 NOTE — PATIENT INSTRUCTIONS
# Left Knee Arthritis: Mark Conn  was seen today for acute on chronic left knee pain. Symptoms had been going on for 4 + weeks. On examination there are positive findings of pain with associated tenderness to palpation over the medial. Imaging findings showed mild medial knee arthritis on x-rays and previous left knee MRI. Likely cause of patient's condition due to flare of mild knee arthritis . Other possible conditions contributing to symptoms include possible meniscal tear but treatment would be the same.  Counseled patient on nature of condition and treatment options.  Given this plan as below, follow-up as needed     Image Findings: mild knee arthritis over the medial joint line on x-rays and previous MRI  Treatment: Activities as tolerated, home exercises given today, over-the-counter knee brace  Job as tolerated    Medications/Injections: Limited tylenol/ibuprofen for pain for 1-2 weeks, Topical Voltaren gel, defer for now  Follow-up: In one month if symptoms do not improve, sooner if worsening  Can consider steroid injection if pain flares    Please call [phone]   Ask for my team if you have any questions or concerns    If you have not yet received the influenza vaccine but would like to get one, please call  [phone] or you can schedule via [employer]    It was great seeing you today!    Bull Echols MD, CAQSM       [employer] Pro Knee Brace Support - Knee Braces for Knee Pain. Relieves ACL, LCL, MCL, Meniscus Tear, Arthritis, Tendonitis Pain.

## 2022-11-21 NOTE — LETTER
11/21/2022         RE: Mark Conn  [address] [address] [place] [id number]        Dear Colleague,    Thank you for referring your patient, Mark Conn, to the [facility]. Please see a copy of my visit note below.    ASSESSMENT & PLAN    Mark was seen today for pain.    Diagnoses and all orders for this visit:    Left knee pain  -     Orthopedic  Referral      This issue is acute on chronic and Worsening.     # Left Knee Arthritis: Mark Conn  was seen today for acute on chronic left knee pain. Symptoms had been going on for 4 + weeks. On examination there are positive findings of pain with associated tenderness to palpation over the medial. Imaging findings showed mild medial knee arthritis on x-rays and previous left knee MRI. Likely cause of patient's condition due to flare of mild knee arthritis . Other possible conditions contributing to symptoms include possible meniscal tear but treatment would be the same.  Counseled patient on nature of condition and treatment options.  Given this plan as below, follow-up as needed     Image Findings: mild knee arthritis over the medial joint line on x-rays and previous MRI  Treatment: Activities as tolerated, home exercises given today, over-the-counter knee brace  Job as tolerated    Medications/Injections: Limited tylenol/ibuprofen for pain for 1-2 weeks, Topical Voltaren gel, defer for now  Follow-up: In one month if symptoms do not improve, sooner if worsening  Can consider steroid injection if pain flares    Bull Echols MD  [facility]    -----  Chief Complaint   Patient presents with     Left Knee - Pain       SUBJECTIVE  Mark Conn is a/an 47 year old male who is seen in consultation at the request of  Ana Echevarria PA-C for evaluation of left knee pain.     The patient is seen by themselves.       Onset: 1 month(s) ago. Reports insidious onset  "without acute precipitating event. Seen in ED 11/5/22 and xrays were performed. Reviewed ER note, left knee x-rays and MRI  Location of Pain: left medial knee pain  Worsened by: prolonged sitting and standing, driving, sleeping    Better with: heat  Treatments tried: ice, heat, ibuprofen, Norco   Associated symptoms: cracking     Orthopedic/Surgical history: Yes- left knee MRI 5/5/2016  Social History/Occupation: construction     No family history pertinent to patient's problem today.      REVIEW OF SYSTEMS:  Review of Systems  Constitutional, HEENT, cardiovascular, pulmonary, GI, , musculoskeletal, neuro, skin, endocrine and psych systems are negative, except as otherwise noted.    OBJECTIVE:  Ht 1.892 m (6' 2.5\")   BMI 40.11 kg/m     General: healthy, alert and in no distress  HEENT: no scleral icterus or conjunctival erythema  Skin: no suspicious lesions or rash. No jaundice.  CV: distal perfusion intact    Resp: normal respiratory effort without conversational dyspnea   Psych: normal mood and affect  Gait: normal steady gait with appropriate coordination and balance    Neuro: Normal light sensory exam of left lower extremity    Ortho Exam   LEFT KNEE  Inspection:    Normal alignment; no edema, erythema, or ecchymosis present  Palpation:    Tender about the medial joint line. Remainder of bony and ligamentous landmarks are nontender.    Mild effusion is present    Patellofemoral crepitus is Absent  Range of Motion:     00 extension to 1350 flexion  Strength:    Quadriceps 5/5, hamstrings 5/5, gastrocsoleus 5/5 and tibialis anterior 5/5    Extensor mechanism intact  Special Tests:    Positive: None    Negative: Patellar grind, MCL/valgus stress (0 & 30 deg), LCL/varus stress (0 & 30 deg), Lachman's, Latha's      RADIOLOGY:  I independently, visualized and reviewed these images with the patient      XR FOOT BILATERAL G/E 3 VIEWS 12/21/2021 1:26 PM      HISTORY: Pain in both feet; Pain in both feet             "                                                          IMPRESSION: Bilateral calcaneal spurs. No calcaneal erosions.  Otherwise unremarkable foot radiographs.     TRISTIN CHASE MD      INDICATION: Medial knee pain, no known trauma.  COMPARISON: None.                                                                      IMPRESSION: Negative left knee radiographs. Normal joint spaces and alignment. No fracture or joint effusion.    Review of external notes as documented elsewhere in note  Review of the result(s) of each unique test - left knee x-rays and MRI       Disclaimer: This note consists of symbols derived from keyboarding, dictation and/or voice recognition software. As a result, there may be errors in the script that have gone undetected. Please consider this when interpreting information found in this chart.        Again, thank you for allowing me to participate in the care of your patient.        Sincerely,        Bull Echols MD

## 2022-11-22 ENCOUNTER — IMMUNIZATION (OUTPATIENT)
Dept: FAMILY MEDICINE | Facility: CLINIC | Age: 47
End: 2022-11-22
Payer: COMMERCIAL

## 2022-11-22 PROCEDURE — 90686 IIV4 VACC NO PRSV 0.5 ML IM: CPT

## 2022-11-22 PROCEDURE — 90471 IMMUNIZATION ADMIN: CPT

## 2023-01-01 NOTE — LETTER
January 29, 2020      To Whom It May Concern:      Mark Conn was seen in our urgent care today, 01/29/20.  I expect his condition to improve over the next few days.  He may return to work when improved.    Sincerely,        BLAISE Berrios CNP         Statement Selected

## 2023-01-30 ENCOUNTER — APPOINTMENT (OUTPATIENT)
Dept: GENERAL RADIOLOGY | Facility: CLINIC | Age: 48
End: 2023-01-30
Attending: PHYSICIAN ASSISTANT
Payer: COMMERCIAL

## 2023-01-30 ENCOUNTER — HOSPITAL ENCOUNTER (EMERGENCY)
Facility: CLINIC | Age: 48
Discharge: HOME | End: 2023-01-30
Attending: PHYSICIAN ASSISTANT | Admitting: PHYSICIAN ASSISTANT
Payer: COMMERCIAL

## 2023-01-30 VITALS
OXYGEN SATURATION: 97 % | HEART RATE: 80 BPM | BODY MASS INDEX: 38.54 KG/M2 | DIASTOLIC BLOOD PRESSURE: 80 MMHG | SYSTOLIC BLOOD PRESSURE: 130 MMHG | TEMPERATURE: 98.3 F | RESPIRATION RATE: 18 BRPM | WEIGHT: 310 LBS | HEIGHT: 75 IN

## 2023-01-30 DIAGNOSIS — J45.41 MODERATE PERSISTENT ASTHMA WITH (ACUTE) EXACERBATION: ICD-10-CM

## 2023-01-30 LAB
FLUAV RNA SPEC QL NAA+PROBE: NEGATIVE
FLUBV RNA RESP QL NAA+PROBE: NEGATIVE
RSV RNA SPEC NAA+PROBE: NEGATIVE
SARS-COV-2 RNA RESP QL NAA+PROBE: NEGATIVE

## 2023-01-30 PROCEDURE — 250N000009 HC RX 250: Performed by: PHYSICIAN ASSISTANT

## 2023-01-30 PROCEDURE — 87637 SARSCOV2&INF A&B&RSV AMP PRB: CPT | Performed by: PHYSICIAN ASSISTANT

## 2023-01-30 PROCEDURE — C9803 HOPD COVID-19 SPEC COLLECT: HCPCS | Performed by: PHYSICIAN ASSISTANT

## 2023-01-30 PROCEDURE — 99214 OFFICE O/P EST MOD 30 MIN: CPT | Mod: CS | Performed by: PHYSICIAN ASSISTANT

## 2023-01-30 PROCEDURE — G0463 HOSPITAL OUTPT CLINIC VISIT: HCPCS | Mod: 25,CS | Performed by: PHYSICIAN ASSISTANT

## 2023-01-30 PROCEDURE — 71045 X-RAY EXAM CHEST 1 VIEW: CPT

## 2023-01-30 RX ORDER — ALBUTEROL SULFATE 0.83 MG/ML
2.5 SOLUTION RESPIRATORY (INHALATION)
Status: DISCONTINUED | OUTPATIENT
Start: 2023-01-30 | End: 2023-01-30 | Stop reason: HOSPADM

## 2023-01-30 RX ORDER — DEXAMETHASONE SODIUM PHOSPHATE 4 MG/ML
10 VIAL (ML) INJECTION ONCE
Status: COMPLETED | OUTPATIENT
Start: 2023-01-30 | End: 2023-01-30

## 2023-01-30 RX ADMIN — DEXAMETHASONE SODIUM PHOSPHATE 10 MG: 4 INJECTION, SOLUTION INTRAMUSCULAR; INTRAVENOUS at 14:37

## 2023-01-30 RX ADMIN — ALBUTEROL SULFATE 2.5 MG: 2.5 SOLUTION RESPIRATORY (INHALATION) at 14:38

## 2023-01-30 ASSESSMENT — ENCOUNTER SYMPTOMS
CARDIOVASCULAR NEGATIVE: 1
SHORTNESS OF BREATH: 1
GASTROINTESTINAL NEGATIVE: 1
COUGH: 1
WHEEZING: 1
CONSTITUTIONAL NEGATIVE: 1

## 2023-01-30 ASSESSMENT — ACTIVITIES OF DAILY LIVING (ADL): ADLS_ACUITY_SCORE: 35

## 2023-01-30 NOTE — DISCHARGE INSTRUCTIONS
Symptomatic cares include: pushing fluids, rest, OTC cold medication such as Mucinex DM or DayQuil/NyQuil for symptomatic relief of cough.  Continue albuterol inhaler 2 puffs every 4 hours as needed for symptomatic wheezing and shortness of breath.  Follow up with PCP if no improvement in 3 to 4 days as needed.     Seek urgent medical evaluation if there are new or worsening symptoms such as fever of 103 degrees F or greater, chest tightness, wheezing, chest pain, shortness of breath, facial pressure, severe headaches, trouble breathing, trouble swallowing, severe or worsening nausea/vomiting, or severe abdominal pain.

## 2023-01-30 NOTE — ED PROVIDER NOTES
History     Chief Complaint   Patient presents with     Shortness of Breath     Cough     Cough, shortness of breath since Wednesday, last night had chest pressure.     HPI  Mark Conn is a 47 year old male with a past medical history of hypertension, chronic gout, restrictive lung disease, moderate persistent asthma, hyperglycemia, hypertriglyceridemia, and SVT who presents for evaluation of cough and shortness of breath which began on January 25, 2023.  Last night, he describes having chest pressure which resolved after albuterol inhaler and coughing.  He denies any chest pain, not short of breath currently.  However, he has been short of breath on occasion over the past several days.  He has been taking his albuterol inhaler more times over the past several days with temporary relief of chest congestion/tightness. No headaches, no fevers, no pain radiating to the shoulder, left arm, or jaw.  No recent URI symptoms.  No history of ACS, kidney concerns, diabetes, thyroid issues.    Allergies:  Allergies   Allergen Reactions     Ampicillin Rash     Codeine Rash     Erythromycin Rash     Keflex [Cephalexin Monohydrate] Rash     Penicillins Rash     Sulfa Drugs Rash       Problem List:    Patient Active Problem List    Diagnosis Date Noted     SVT (supraventricular tachycardia) (H) 12/21/2021     Priority: Medium     Obesity (BMI 35.0-39.9) with comorbidity (H) 10/02/2019     Priority: Medium     Moderate persistent asthma, unspecified whether complicated 04/26/2018     Priority: Medium     Hypoglycemia 04/26/2018     Priority: Medium     Restrictive lung disease 08/30/2017     Priority: Medium     See PFT August, 2017.        Chronic gout without tophus, unspecified cause, unspecified site 09/07/2016     Priority: Medium     Obesity 03/06/2015     Priority: Medium     Benign essential hypertension 03/23/2011     Priority: Medium     CARDIOVASCULAR SCREENING; LDL GOAL LESS THAN 130 10/31/2010     Priority:  Medium     Hypertriglyceridemia 01/17/2010     Priority: Medium     249, Jan, 2010. 755 in Sept, 2012 and Lopid started.        Malabsorption of glucose 01/17/2010     Priority: Medium     108, Jan, 2010.   (Problem list name updated by automated process. Provider to review and confirm.)       Colitis 10/12/2009     Priority: Medium     Colonoscopy 10-12-09       Pain in limb 10/17/2006     Priority: Medium     ?lyme disease, initial screen interdeterminate, with negative WB, better on doxycycline  Repeating test in end of Nov          Past Medical History:    Past Medical History:   Diagnosis Date     Other acne        Past Surgical History:    Past Surgical History:   Procedure Laterality Date     SURGICAL HISTORY OF -       Appendectomy     SURGICAL HISTORY OF -   age 2    Hernia - Right Inguinal     SURGICAL HISTORY OF -   age 3    Pectoral-Sternum Repair      SURGICAL HISTORY OF -   08/12/85    Tonsillectomy       Family History:    Family History   Problem Relation Age of Onset     Hypertension Mother      Lipids Mother      Diabetes Mother      Thyroid Disease Father      Other Cancer Father 65        Gastric-spread to the bones, liver.     Diabetes Maternal Grandmother      Hypertension Maternal Grandfather      Diabetes Paternal Grandmother      Thyroid Disease Sister         Hyperthyroid.       Social History:  Marital Status:   [2]  Social History     Tobacco Use     Smoking status: Never     Smokeless tobacco: Never   Vaping Use     Vaping Use: Never used   Substance Use Topics     Alcohol use: Yes     Comment: Sociallly- not very often.     Drug use: No        Medications:    albuterol (PROAIR HFA/PROVENTIL HFA/VENTOLIN HFA) 108 (90 Base) MCG/ACT inhaler  allopurinol (ZYLOPRIM) 300 MG tablet  ciclesonide (ALVESCO) 80 MCG/ACT inhaler  diclofenac (VOLTAREN) 1 % topical gel  gabapentin (NEURONTIN) 300 MG capsule  gemfibrozil (LOPID) 600 MG tablet  lisinopril (ZESTRIL) 20 MG tablet  metoprolol  "succinate ER (TOPROL-XL) 50 MG 24 hr tablet  montelukast (SINGULAIR) 10 MG tablet  multivitamin w/minerals (THERA-VIT-M) tablet  Omega-3 Fatty Acids (OMEGA-3 FISH OIL PO)          Review of Systems   Constitutional: Negative.    HENT: Negative.    Respiratory: Positive for cough, shortness of breath and wheezing.    Cardiovascular: Negative.    Gastrointestinal: Negative.        Physical Exam   BP: 130/80  Pulse: 80  Temp: 98.3  F (36.8  C)  Resp: 18  Height: 190.5 cm (6' 3\")  Weight: 140.6 kg (310 lb)  SpO2: 97 %      Physical Exam  Vitals reviewed.   Constitutional:       General: He is not in acute distress.     Appearance: Normal appearance. He is not ill-appearing, toxic-appearing or diaphoretic.   HENT:      Head: Normocephalic and atraumatic.      Nose: Nose normal. No congestion or rhinorrhea.      Mouth/Throat:      Mouth: Mucous membranes are moist.      Pharynx: Oropharynx is clear. No oropharyngeal exudate or posterior oropharyngeal erythema.   Cardiovascular:      Rate and Rhythm: Normal rate and regular rhythm.      Pulses: Normal pulses.      Heart sounds: Normal heart sounds. No murmur heard.  Pulmonary:      Effort: Pulmonary effort is normal. No accessory muscle usage or respiratory distress.      Breath sounds: No stridor. Examination of the right-middle field reveals wheezing. Examination of the left-middle field reveals wheezing. Examination of the right-lower field reveals wheezing. Examination of the left-lower field reveals wheezing. Wheezing present. No decreased breath sounds, rhonchi or rales.   Chest:      Chest wall: No tenderness.   Musculoskeletal:         General: Normal range of motion.      Cervical back: Normal range of motion and neck supple. No rigidity. No muscular tenderness.   Lymphadenopathy:      Cervical: No cervical adenopathy.   Skin:     General: Skin is warm and dry.   Neurological:      Mental Status: He is alert and oriented to person, place, and time.         ED " Course                 Procedures         1500-the patient feels notably better after receiving an albuterol nebulizer in clinic.  Wheezing is improved, lungs feel less tight.  Feels able to go home and manage symptoms on his own.       Results for orders placed or performed during the hospital encounter of 01/30/23 (from the past 24 hour(s))   Symptomatic Influenza A/B & SARS-CoV2 (COVID-19) Virus PCR Multiplex Nose    Specimen: Nose; Swab   Result Value Ref Range    Influenza A PCR Negative Negative    Influenza B PCR Negative Negative    RSV PCR Negative Negative    SARS CoV2 PCR Negative Negative    Narrative    Testing was performed using the Xpert Xpress CoV2/Flu/RSV Assay on the Cepheid GeneXpert Instrument. This test should be ordered for the detection of SARS-CoV-2 and influenza viruses in individuals who meet clinical and/or epidemiological criteria. Test performance is unknown in asymptomatic patients. This test is for in vitro diagnostic use under the FDA EUA for laboratories certified under CLIA to perform high or moderate complexity testing. This test has not been FDA cleared or approved. A negative result does not rule out the presence of PCR inhibitors in the specimen or target RNA in concentration below the limit of detection for the assay. If only one viral target is positive but coinfection with multiple targets is suspected, the sample should be re-tested with another FDA cleared, approved, or authorized test, if coinfection would change clinical management. This test was validated by the [facility]. These laboratories are certified under the Clinical Laboratory Improvement Amendments of 1988 (CLIA-88) as qualified to perform high complexity laboratory testing.   XR Chest Port 1 View    Narrative    CHEST ONE VIEW PORTABLE   1/30/2023 2:09 PM       INDICATION: Wheezing  COMPARISON: 5/5/2019       Impression    IMPRESSION: Negative chest. No acute infiltrate or significant  change.  Stable appearance of the chest wall/ribs.    RIGO EDWARDS MD         SYSTEM ID:  [id number]       Medications   dexamethasone (DECADRON) injectable solution used ORALLY 10 mg (10 mg Oral Given 1/30/23 8588)       Assessments & Plan (with Medical Decision Making)   The patient is a 47 year old male with a past medical history of hypertension, chronic gout, restrictive lung disease, moderate persistent asthma, hyperglycemia, hypertriglyceridemia, and SVT who presents for evaluation of cough and shortness of breath which began on January 25th 2023.  He is in no respiratory distress currently, no symptoms consistent with ACS, no chest pain or shortness of breath.  He does have persistent wheezing bilaterally but no accessory muscle use for breathing, denies struggling to get a full breath, normal oxygen saturation.  Presentation and physical exam are consistent with asthma exacerbation.  Administered Decadron in clinic.    Negative results for COVID-19, RSV, influenza today.  No acute cardiopulmonary concerns on chest x-ray today.    Symptomatic cares include: pushing fluids, rest, OTC cold medication such as Mucinex DM or DayQuil/NyQuil for symptomatic relief of cough.  Continue albuterol inhaler 2 puffs every 4 hours as needed for symptomatic wheezing and shortness of breath.  Follow up with PCP if no improvement in 3 to 4 days as needed.     Seek urgent medical evaluation if there are new or worsening symptoms such as fever of 103 degrees F or greater, chest tightness, wheezing, chest pain, shortness of breath, facial pressure, severe headaches, trouble breathing, trouble swallowing, severe or worsening nausea/vomiting, or severe abdominal pain.     Pt/guardian verbalized understanding and agrees with the treatment plan.          I have reviewed the nursing notes.    I have reviewed the findings, diagnosis, plan and need for follow up with the patient.      Discharge Medication List as of 1/30/2023  3:06 PM           Final diagnoses:   Moderate persistent asthma with (acute) exacerbation       1/30/2023   [facility] EMERGENCY DEPT     Paolo Ratliff PA-C  01/30/23 2648

## 2023-01-31 DIAGNOSIS — I10 BENIGN ESSENTIAL HYPERTENSION: ICD-10-CM

## 2023-01-31 RX ORDER — METOPROLOL SUCCINATE 50 MG/1
TABLET, EXTENDED RELEASE ORAL
Qty: 90 TABLET | Refills: 3 | Status: SHIPPED | OUTPATIENT
Start: 2023-01-31 | End: 2024-01-29

## 2023-02-05 ENCOUNTER — OFFICE VISIT (OUTPATIENT)
Dept: URGENT CARE | Facility: URGENT CARE | Age: 48
End: 2023-02-05
Payer: COMMERCIAL

## 2023-02-05 VITALS
HEART RATE: 75 BPM | SYSTOLIC BLOOD PRESSURE: 136 MMHG | OXYGEN SATURATION: 95 % | RESPIRATION RATE: 22 BRPM | TEMPERATURE: 97.7 F | DIASTOLIC BLOOD PRESSURE: 84 MMHG

## 2023-02-05 DIAGNOSIS — J45.51 SEVERE PERSISTENT ASTHMA WITH EXACERBATION (H): Primary | ICD-10-CM

## 2023-02-05 PROCEDURE — 99213 OFFICE O/P EST LOW 20 MIN: CPT | Performed by: EMERGENCY MEDICINE

## 2023-02-05 RX ORDER — FLUTICASONE PROPIONATE AND SALMETEROL 250; 50 UG/1; UG/1
1 POWDER RESPIRATORY (INHALATION) EVERY 12 HOURS
Qty: 14 EACH | Refills: 1 | Status: SHIPPED | OUTPATIENT
Start: 2023-02-05 | End: 2023-03-21

## 2023-02-05 RX ORDER — METHYLPREDNISOLONE 4 MG
TABLET, DOSE PACK ORAL
Qty: 21 TABLET | Refills: 0 | Status: SHIPPED | OUTPATIENT
Start: 2023-02-05 | End: 2023-03-21

## 2023-02-05 RX ORDER — PREDNISONE 50 MG/1
50 TABLET ORAL DAILY
Qty: 3 TABLET | Refills: 0 | Status: SHIPPED | OUTPATIENT
Start: 2023-02-05 | End: 2023-02-08

## 2023-02-05 NOTE — PROGRESS NOTES
CHIEF COMPLAINT: Persistent exacerbation of asthma      HPI: Patient is a 47-year-old male history of chronic asthma whose had persistent wheezing and symptoms for 3 weeks.  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  No viral or infectious triggers.  Patient has been using his albuterol inhaler with only mild improvement.  He was seen about 1 week ago in the emergency department at which time he was given Decadron and an albuterol nebulizer with improvements for several days but over the last 2 to 3 days his symptoms have gotten worse with chest tightness and wheezing.      ROS: See HPI otherwise normal.    Allergies   Allergen Reactions     Ampicillin Rash     Codeine Rash     Erythromycin Rash     Keflex [Cephalexin Monohydrate] Rash     Penicillins Rash     Sulfa Drugs Rash      Current Outpatient Medications   Medication Sig Dispense Refill     albuterol (PROAIR HFA/PROVENTIL HFA/VENTOLIN HFA) 108 (90 Base) MCG/ACT inhaler Inhale 2 puffs into the lungs every 6 hours as needed for shortness of breath / dyspnea or wheezing 18 g 3     allopurinol (ZYLOPRIM) 300 MG tablet Take 1 tablet (300 mg) by mouth daily 90 tablet 3     diclofenac (VOLTAREN) 1 % topical gel Apply 4 g topically 4 times daily 150 g 4     fluticasone-salmeterol (ADVAIR) 250-50 MCG/ACT inhaler Inhale 1 puff into the lungs every 12 hours 14 each 1     gabapentin (NEURONTIN) 300 MG capsule TAKE ONE CAPSULE BY MOUTH AT BEDTIME 90 capsule 1     gemfibrozil (LOPID) 600 MG tablet Take 1 tablet (600 mg) by mouth 2 times daily (before meals) 180 tablet 3     lisinopril (ZESTRIL) 20 MG tablet Take 1 tablet (20 mg) by mouth daily 90 tablet 3     methylPREDNISolone (MEDROL DOSEPAK) 4 MG tablet therapy pack Follow Package Directions 21 tablet 0     metoprolol succinate ER (TOPROL XL) 50 MG 24 hr tablet TAKE ONE TABLET BY MOUTH ONCE DAILY 90 tablet 3     montelukast (SINGULAIR) 10 MG tablet Take 1 tablet (10 mg) by mouth At Bedtime 90 tablet 3      multivitamin w/minerals (THERA-VIT-M) tablet Take 1 tablet by mouth 2 times daily.       Omega-3 Fatty Acids (OMEGA-3 FISH OIL PO)        predniSONE (DELTASONE) 50 MG tablet Take 1 tablet (50 mg) by mouth daily for 3 days 3 tablet 0     ciclesonide (ALVESCO) 80 MCG/ACT inhaler Inhale 1 puff into the lungs 2 times daily 6.1 g 0         PE: Physical exam reveals a 47-year-old male who is in no acute distress he is nondyspneic appearing.  Vital signs are normal including a pulse ox of 95%.  Examination of his chest reveals diffuse inspiratory and expiratory wheezes with moderately reduced excursion.  No rales heard.  Heart is regular.  Skin warm and moist        TREATMENT: None.      ASSESSMENT: Persistent exacerbation of asthma, requiring more prolonged definitive treatment      DIAGNOSIS: Severe asthma with exacerbation.      PLAN: Prednisone 50 mg x 3 followed by Medrol Dosepak.  We will add Advair with follow-up in 1 week if symptoms persist, sooner if worse

## 2023-02-05 NOTE — PATIENT INSTRUCTIONS
Start 3 days of prednisone followed by Medrol Dosepak  Start Advair inhaler and use every day  Use rescue inhaler-albuterol every 4 hours as needed  Return if any worsening symptoms or no improvement in 2 or 3 days.

## 2023-02-25 DIAGNOSIS — M1A.9XX0 CHRONIC GOUT WITHOUT TOPHUS, UNSPECIFIED CAUSE, UNSPECIFIED SITE: ICD-10-CM

## 2023-02-26 DIAGNOSIS — G62.9 NEUROPATHY: ICD-10-CM

## 2023-02-26 DIAGNOSIS — J45.40 MODERATE PERSISTENT ASTHMA, UNSPECIFIED WHETHER COMPLICATED: ICD-10-CM

## 2023-02-27 RX ORDER — ALLOPURINOL 300 MG/1
TABLET ORAL
Qty: 30 TABLET | Refills: 0 | Status: SHIPPED | OUTPATIENT
Start: 2023-02-27 | End: 2023-03-21

## 2023-02-28 RX ORDER — MONTELUKAST SODIUM 10 MG/1
TABLET ORAL
Qty: 90 TABLET | Refills: 0 | Status: SHIPPED | OUTPATIENT
Start: 2023-02-28 | End: 2023-03-21

## 2023-02-28 RX ORDER — GABAPENTIN 300 MG/1
CAPSULE ORAL
Qty: 90 CAPSULE | Refills: 0 | Status: SHIPPED | OUTPATIENT
Start: 2023-02-28 | End: 2023-03-21

## 2023-03-10 ENCOUNTER — OFFICE VISIT (OUTPATIENT)
Dept: ORTHOPEDICS | Facility: CLINIC | Age: 48
End: 2023-03-10
Payer: COMMERCIAL

## 2023-03-10 VITALS — WEIGHT: 315 LBS | BODY MASS INDEX: 39.17 KG/M2 | HEIGHT: 75 IN

## 2023-03-10 DIAGNOSIS — M25.562 CHRONIC PAIN OF LEFT KNEE: Primary | ICD-10-CM

## 2023-03-10 DIAGNOSIS — G89.29 CHRONIC PAIN OF LEFT KNEE: Primary | ICD-10-CM

## 2023-03-10 PROCEDURE — 20611 DRAIN/INJ JOINT/BURSA W/US: CPT | Mod: LT | Performed by: FAMILY MEDICINE

## 2023-03-10 RX ORDER — ROPIVACAINE HYDROCHLORIDE 5 MG/ML
4 INJECTION, SOLUTION EPIDURAL; INFILTRATION; PERINEURAL
Status: DISCONTINUED | OUTPATIENT
Start: 2023-03-10 | End: 2023-03-21

## 2023-03-10 RX ORDER — BETAMETHASONE SODIUM PHOSPHATE AND BETAMETHASONE ACETATE 3; 3 MG/ML; MG/ML
6 INJECTION, SUSPENSION INTRA-ARTICULAR; INTRALESIONAL; INTRAMUSCULAR; SOFT TISSUE
Status: DISCONTINUED | OUTPATIENT
Start: 2023-03-10 | End: 2023-03-21

## 2023-03-10 RX ADMIN — ROPIVACAINE HYDROCHLORIDE 4 ML: 5 INJECTION, SOLUTION EPIDURAL; INFILTRATION; PERINEURAL at 15:01

## 2023-03-10 RX ADMIN — BETAMETHASONE SODIUM PHOSPHATE AND BETAMETHASONE ACETATE 6 MG: 3; 3 INJECTION, SUSPENSION INTRA-ARTICULAR; INTRALESIONAL; INTRAMUSCULAR; SOFT TISSUE at 15:01

## 2023-03-10 ASSESSMENT — PAIN SCALES - GENERAL: PAINLEVEL: MILD PAIN (3)

## 2023-03-10 NOTE — PROGRESS NOTES
ASSESSMENT & PLAN    Mark was seen today for pain.    Diagnoses and all orders for this visit:    Chronic pain of left knee  -     Large Joint Injection/Arthocentesis: L knee joint        # Left Knee Arthritis: Mark Conn  was seen today for acute on chronic left knee pain. Symptoms had been going on for 12+ weeks. On examination there are positive findings of pain with associated tenderness to palpation over the medial joint line. Imaging findings showed mild medial knee arthritis on x-rays and previous left knee MRI. Likely cause of patient's condition due to flare of mild knee arthritis . Other possible conditions contributing to symptoms include possible meniscal tear but treatment would be the same.  Counseled patient on nature of condition and treatment options.  Given this plan as below, follow-up as needed     Image Findings: mild knee arthritis over the medial joint line on x-rays and previous MRI  Treatment: Activities as tolerated, continue home exercises, over-the-counter knee brace  Job as tolerated    Medications/Injections: Limited tylenol/ibuprofen for pain for 1-2 weeks, Topical Voltaren gel, left knee joint steroid injection  Follow-up: In one month if symptoms do not improve, sooner if worsening  Can consider repeat steroid injection if pain flares    -----    SUBJECTIVE:  Mark Conn is a 47 year old male who is seen in follow-up for left knee pain. They were last seen 11/21/2022.  The patient is seen by themselves.    Since their last visit reports persisting left knee pain.  They indicate that their current pain level is 3/10. They have tried heat, Voltaren, Ibuprofen .        Patient's past medical, surgical, social, and family histories were reviewed today and no changes are noted.    REVIEW OF SYSTEMS:  Constitutional: NEGATIVE for fever, chills, change in weight  Skin: NEGATIVE for worrisome rashes, moles or lesions  GI/: NEGATIVE for bowel or bladder changes  Neuro: NEGATIVE  "for weakness, dizziness or paresthesias    OBJECTIVE:  Ht 1.905 m (6' 3\")   Wt 144.4 kg (318 lb 6.4 oz)   BMI 39.80 kg/m     General: healthy, alert and in no distress  HEENT: no scleral icterus or conjunctival erythema  Skin: no suspicious lesions or rash. No jaundice.  CV: regular rhythm by palpation, no pedal edema  Resp: normal respiratory effort without conversational dyspnea   Psych: normal mood and affect  Gait: normal steady gait with appropriate coordination and balance  Neuro: normal light touch sensory exam of the extremities.    MSK:    Ortho Exam   LEFT KNEE  Inspection:    Normal alignment; no edema, erythema, or ecchymosis present  Palpation:    Tender about the medial joint line. Remainder of bony and ligamentous landmarks are nontender.    Mild effusion is present    Patellofemoral crepitus is Absent  Range of Motion:     00 extension to 1350 flexion  Strength:    Quadriceps 5/5, hamstrings 5/5, gastrocsoleus 5/5 and tibialis anterior 5/5    Extensor mechanism intact  Special Tests:    Positive: None    Negative: Patellar grind, MCL/valgus stress (0 & 30 deg), LCL/varus stress (0 & 30 deg), Lachman's, Latha's    Independent visualization of the below image:  EXAM: XR KNEE LEFT 1/2 VIEWS  LOCATION: [facility]  DATE/TIME: 11/5/2022 4:50 PM     INDICATION: Medial knee pain, no known trauma.  COMPARISON: None.                                                                      IMPRESSION: Negative left knee radiographs. Normal joint spaces and alignment. No fracture or joint effusion.     Bull Echols MD, [facility]    Disclaimer: This note consists of symbols derived from keyboarding, dictation and/or voice recognition software. As a result, there may be errors in the script that have gone undetected. Please consider this when interpreting information found in this chart.    Large Joint Injection/Arthocentesis: L knee " joint    Date/Time: 3/10/2023 3:01 PM  Performed by: Bull Echols MD  Authorized by: Bull Echols MD     Indications:  Pain  Needle Size:  25 G  Guidance: ultrasound    Approach:  Superolateral  Location:  Knee      Medications:  6 mg betamethasone acet & sod phos 6 (3-3) MG/ML; 4 mL ropivacaine 5 MG/ML  Outcome:  Tolerated well, no immediate complications  Procedure discussed: discussed risks, benefits, and alternatives    Consent Given by:  Patient  Timeout: timeout called immediately prior to procedure    Prep: patient was prepped and draped in usual sterile fashion     Ultrasound images of procedure were permanently stored.     Patient reported significant improvement of pain after the numbing portion left knee joint steroid injection.  Ultrasound guided images were permanently stored.   Aftercare instructions given to patient.  Plan to follow-up as discussed above.     Bull Echols MD [facility]

## 2023-03-10 NOTE — PATIENT INSTRUCTIONS
# Left Knee Arthritis: Mark Conn  was seen today for acute on chronic left knee pain. Symptoms had been going on for 12+ weeks. On examination there are positive findings of pain with associated tenderness to palpation over the medial joint line. Imaging findings showed mild medial knee arthritis on x-rays and previous left knee MRI. Likely cause of patient's condition due to flare of mild knee arthritis . Other possible conditions contributing to symptoms include possible meniscal tear but treatment would be the same.  Counseled patient on nature of condition and treatment options.  Given this plan as below, follow-up as needed     Image Findings: mild knee arthritis over the medial joint line on x-rays and previous MRI  Treatment: Activities as tolerated, continue home exercises, over-the-counter knee brace  Job as tolerated    Medications/Injections: Limited tylenol/ibuprofen for pain for 1-2 weeks, Topical Voltaren gel, left knee joint steroid injection  Follow-up: In one month if symptoms do not improve, sooner if worsening  Can consider repeat steroid injection if pain flares    It was great seeing you again today!    Bull Echols     [facility] Injection Discharge Instructions    Procedure: left knee joint steroid injection    You may shower, however avoid swimming, tub baths or hot tubs for 24 hours following your procedure  You may have a mild to moderate increase in pain for several days following the injection.  It may take up to 14 days for the steroid medication to start working although you may feel the effect as early as a few days after the procedure.  You may use ice packs for 10-15 minutes, 3 to 4 times a day at the injection site for comfort  You may use anti-inflammatory medications (such as Ibuprofen or Aleve or Advil) or Tylenol for pain control if necessary  If you were fasting, you may resume your normal diet and medications after the procedure  If you have diabetes, check your blood  sugar more frequently than usual as your blood sugar may be higher than normal for 10-14 days following a steroid injection. Contact your doctor who manages your diabetes if your blood sugar is higher than usual    If you experience any of the following, call [facility] @ [phone] or [phone]  -Fever over 100 degree F  -Swelling, bleeding, redness, drainage, warmth at the injection site  - New or worsening pain

## 2023-03-10 NOTE — LETTER
3/10/2023         RE: Mark Conn  [address] [facility] [id number]        Dear Colleague,    Thank you for referring your patient, Mark Conn, to the [facility] [place]. Please see a copy of my visit note below.    ASSESSMENT & PLAN    Mark was seen today for pain.    Diagnoses and all orders for this visit:    Chronic pain of left knee  -     Large Joint Injection/Arthocentesis: L knee joint        # Left Knee Arthritis: Mark Conn  was seen today for acute on chronic left knee pain. Symptoms had been going on for 12+ weeks. On examination there are positive findings of pain with associated tenderness to palpation over the medial joint line. Imaging findings showed mild medial knee arthritis on x-rays and previous left knee MRI. Likely cause of patient's condition due to flare of mild knee arthritis . Other possible conditions contributing to symptoms include possible meniscal tear but treatment would be the same.  Counseled patient on nature of condition and treatment options.  Given this plan as below, follow-up as needed     Image Findings: mild knee arthritis over the medial joint line on x-rays and previous MRI  Treatment: Activities as tolerated, continue home exercises, over-the-counter knee brace  Job as tolerated    Medications/Injections: Limited tylenol/ibuprofen for pain for 1-2 weeks, Topical Voltaren gel, left knee joint steroid injection  Follow-up: In one month if symptoms do not improve, sooner if worsening  Can consider repeat steroid injection if pain flares    -----    SUBJECTIVE:  Mark Conn is a 47 year old male who is seen in follow-up for left knee pain. They were last seen 11/21/2022.  The patient is seen by themselves.    Since their last visit reports persisting left knee pain.  They indicate that their current pain level is 3/10. They have tried heat, Voltaren, Ibuprofen .        Patient's past medical, surgical, social,  "and family histories were reviewed today and no changes are noted.    REVIEW OF SYSTEMS:  Constitutional: NEGATIVE for fever, chills, change in weight  Skin: NEGATIVE for worrisome rashes, moles or lesions  GI/: NEGATIVE for bowel or bladder changes  Neuro: NEGATIVE for weakness, dizziness or paresthesias    OBJECTIVE:  Ht 1.905 m (6' 3\")   Wt 144.4 kg (318 lb 6.4 oz)   BMI 39.80 kg/m     General: healthy, alert and in no distress  HEENT: no scleral icterus or conjunctival erythema  Skin: no suspicious lesions or rash. No jaundice.  CV: regular rhythm by palpation, no pedal edema  Resp: normal respiratory effort without conversational dyspnea   Psych: normal mood and affect  Gait: normal steady gait with appropriate coordination and balance  Neuro: normal light touch sensory exam of the extremities.    MSK:    Ortho Exam   LEFT KNEE  Inspection:    Normal alignment; no edema, erythema, or ecchymosis present  Palpation:    Tender about the medial joint line. Remainder of bony and ligamentous landmarks are nontender.    Mild effusion is present    Patellofemoral crepitus is Absent  Range of Motion:     00 extension to 1350 flexion  Strength:    Quadriceps 5/5, hamstrings 5/5, gastrocsoleus 5/5 and tibialis anterior 5/5    Extensor mechanism intact  Special Tests:    Positive: None    Negative: Patellar grind, MCL/valgus stress (0 & 30 deg), LCL/varus stress (0 & 30 deg), Lachman's, Latha's    Independent visualization of the below image:  EXAM: XR KNEE LEFT 1/2 VIEWS  LOCATION: [facility]  DATE/TIME: 11/5/2022 4:50 PM     INDICATION: Medial knee pain, no known trauma.  COMPARISON: None.                                                                      IMPRESSION: Negative left knee radiographs. Normal joint spaces and alignment. No fracture or joint effusion.     Bull Echols MD, [facility]    Disclaimer: This note consists of symbols derived " from keyboarding, dictation and/or voice recognition software. As a result, there may be errors in the script that have gone undetected. Please consider this when interpreting information found in this chart.    Large Joint Injection/Arthocentesis: L knee joint    Date/Time: 3/10/2023 3:01 PM  Performed by: Bull Echols MD  Authorized by: Bull Echols MD     Indications:  Pain  Needle Size:  25 G  Guidance: ultrasound    Approach:  Superolateral  Location:  Knee      Medications:  6 mg betamethasone acet & sod phos 6 (3-3) MG/ML; 4 mL ropivacaine 5 MG/ML  Outcome:  Tolerated well, no immediate complications  Procedure discussed: discussed risks, benefits, and alternatives    Consent Given by:  Patient  Timeout: timeout called immediately prior to procedure    Prep: patient was prepped and draped in usual sterile fashion     Ultrasound images of procedure were permanently stored.     Patient reported significant improvement of pain after the numbing portion left knee joint steroid injection.  Ultrasound guided images were permanently stored.   Aftercare instructions given to patient.  Plan to follow-up as discussed above.     Bull Echols MD [facility]              Again, thank you for allowing me to participate in the care of your patient.        Sincerely,        Bull Echols MD

## 2023-03-19 ASSESSMENT — ASTHMA QUESTIONNAIRES
QUESTION_1 LAST FOUR WEEKS HOW MUCH OF THE TIME DID YOUR ASTHMA KEEP YOU FROM GETTING AS MUCH DONE AT WORK, SCHOOL OR AT HOME: NONE OF THE TIME
ACT_TOTALSCORE: 20
QUESTION_3 LAST FOUR WEEKS HOW OFTEN DID YOUR ASTHMA SYMPTOMS (WHEEZING, COUGHING, SHORTNESS OF BREATH, CHEST TIGHTNESS OR PAIN) WAKE YOU UP AT NIGHT OR EARLIER THAN USUAL IN THE MORNING: ONCE OR TWICE
EMERGENCY_ROOM_LAST_YEAR_TOTAL: TWO
QUESTION_4 LAST FOUR WEEKS HOW OFTEN HAVE YOU USED YOUR RESCUE INHALER OR NEBULIZER MEDICATION (SUCH AS ALBUTEROL): TWO OR THREE TIMES PER WEEK
ACT_TOTALSCORE: 20
QUESTION_2 LAST FOUR WEEKS HOW OFTEN HAVE YOU HAD SHORTNESS OF BREATH: ONCE OR TWICE A WEEK
QUESTION_5 LAST FOUR WEEKS HOW WOULD YOU RATE YOUR ASTHMA CONTROL: WELL CONTROLLED

## 2023-03-21 ENCOUNTER — VIRTUAL VISIT (OUTPATIENT)
Dept: FAMILY MEDICINE | Facility: CLINIC | Age: 48
End: 2023-03-21
Payer: COMMERCIAL

## 2023-03-21 DIAGNOSIS — I10 BENIGN ESSENTIAL HYPERTENSION: Primary | ICD-10-CM

## 2023-03-21 DIAGNOSIS — G62.9 NEUROPATHY: ICD-10-CM

## 2023-03-21 DIAGNOSIS — J45.40 MODERATE PERSISTENT ASTHMA, UNSPECIFIED WHETHER COMPLICATED: ICD-10-CM

## 2023-03-21 DIAGNOSIS — Z12.11 SCREEN FOR COLON CANCER: ICD-10-CM

## 2023-03-21 DIAGNOSIS — I47.10 SVT (SUPRAVENTRICULAR TACHYCARDIA) (H): ICD-10-CM

## 2023-03-21 DIAGNOSIS — E66.01 MORBID OBESITY (H): ICD-10-CM

## 2023-03-21 DIAGNOSIS — E78.1 HYPERTRIGLYCERIDEMIA: ICD-10-CM

## 2023-03-21 DIAGNOSIS — M1A.9XX0 CHRONIC GOUT WITHOUT TOPHUS, UNSPECIFIED CAUSE, UNSPECIFIED SITE: ICD-10-CM

## 2023-03-21 PROCEDURE — 99214 OFFICE O/P EST MOD 30 MIN: CPT | Mod: VID | Performed by: NURSE PRACTITIONER

## 2023-03-21 RX ORDER — ALLOPURINOL 300 MG/1
1 TABLET ORAL DAILY
Qty: 90 TABLET | Refills: 3 | Status: SHIPPED | OUTPATIENT
Start: 2023-03-21 | End: 2024-03-26

## 2023-03-21 RX ORDER — MONTELUKAST SODIUM 10 MG/1
1 TABLET ORAL AT BEDTIME
Qty: 90 TABLET | Refills: 3 | Status: SHIPPED | OUTPATIENT
Start: 2023-03-21 | End: 2024-03-26

## 2023-03-21 RX ORDER — GEMFIBROZIL 600 MG/1
600 TABLET, FILM COATED ORAL
Qty: 180 TABLET | Refills: 3 | Status: SHIPPED | OUTPATIENT
Start: 2023-03-21 | End: 2024-03-26

## 2023-03-21 RX ORDER — LISINOPRIL 20 MG/1
20 TABLET ORAL DAILY
Qty: 90 TABLET | Refills: 3 | Status: SHIPPED | OUTPATIENT
Start: 2023-03-21 | End: 2024-03-11

## 2023-03-21 RX ORDER — GABAPENTIN 300 MG/1
300 CAPSULE ORAL AT BEDTIME
Qty: 90 CAPSULE | Refills: 3 | Status: SHIPPED | OUTPATIENT
Start: 2023-03-21 | End: 2024-03-26

## 2023-03-21 NOTE — PROGRESS NOTES
"Kamran is a 47 year old who is being evaluated via a billable video visit.      How would you like to obtain your AVS? MyChart  If the video visit is dropped, the invitation should be resent by: Text to cell phone: [phone]  Will anyone else be joining your video visit? No          Assessment & Plan     (I10) Benign essential hypertension  (primary encounter diagnosis)  Comment:  Controlled no change in treatment plan will obtain labs   Plan: lisinopril (ZESTRIL) 20 MG tablet, Basic         metabolic panel  (Ca, Cl, CO2, Creat, Gluc, K,         Na, BUN), CBC with platelets         (E78.1) Hypertriglyceridemia  Comment:  Controlled no change in treatment plan  Plan: gemfibrozil (LOPID) 600 MG tablet, Lipid panel         reflex to direct LDL Fasting      (G62.9) Neuropathy  Comment:  Controlled no change in treatment plan  Plan: gabapentin (NEURONTIN) 300 MG capsule    (M1A.9XX0) Chronic gout without tophus, unspecified cause, unspecified site  Comment:  Controlled no change in treatment plan  Plan: allopurinol (ZYLOPRIM) 300 MG tablet            (J45.40) Moderate persistent asthma, unspecified whether complicated  Comment:  Controlled no change in treatment plan  Plan: montelukast (SINGULAIR) 10 MG tablet      (I47.1) SVT (supraventricular tachycardia) (H)  Comment:  Controlled no change in treatment plan  Plan:     (E66.01) Morbid obesity (H)  Comment: reviewed diet and working out   Plan:     (Z12.11) Screen for colon cancer  Comment:   Plan: Colonoscopy Screening  Referral            6}     BMI:   Estimated body mass index is 39.8 kg/m  as calculated from the following:    Height as of 3/10/23: 1.905 m (6' 3\").    Weight as of 3/10/23: 144.4 kg (318 lb 6.4 oz).   Weight management plan: Discussed healthy diet and exercise guidelines    See Patient Instructions    No follow-ups on file.    BLAISE Quinn CNP  [facility]    Subjective   Kamran is a 47 year old, presenting for " the following health issues:  No chief complaint on file.      History of Present Illness       Reason for visit:  Med check    He eats 2-3 servings of fruits and vegetables daily.He consumes 4 sweetened beverage(s) daily.He exercises with enough effort to increase his heart rate 30 to 60 minutes per day.  He exercises with enough effort to increase his heart rate 3 or less days per week.   He is taking medications regularly.       Hypertension Follow-up      Do you check your blood pressure regularly outside of the clinic? Yes     Are you following a low salt diet? No    Are your blood pressures ever more than 140 on the top number (systolic) OR more   than 90 on the bottom number (diastolic), for example 140/90? No          Review of Systems   Constitutional, HEENT, cardiovascular, pulmonary, gi and gu systems are negative, except as otherwise noted.      Objective           Vitals:  No vitals were obtained today due to virtual visit.    Physical Exam   GENERAL: Healthy, alert and no distress  EYES: Eyes grossly normal to inspection.  No discharge or erythema, or obvious scleral/conjunctival abnormalities.  RESP: No audible wheeze, cough, or visible cyanosis.  No visible retractions or increased work of breathing.    SKIN: Visible skin clear. No significant rash, abnormal pigmentation or lesions.  NEURO: Cranial nerves grossly intact.  Mentation and speech appropriate for age.  PSYCH: Mentation appears normal, affect normal/bright, judgement and insight intact, normal speech and appearance well-groomed.    No results found for any visits on 03/21/23.            Video-Visit Details    Type of service:  Video Visit     Originating Location (pt. Location): Home    Distant Location (provider location):  On-site  Platform used for Video Visit: Virgilio

## 2023-03-31 ENCOUNTER — LAB (OUTPATIENT)
Dept: LAB | Facility: CLINIC | Age: 48
End: 2023-03-31
Payer: COMMERCIAL

## 2023-03-31 DIAGNOSIS — I10 BENIGN ESSENTIAL HYPERTENSION: ICD-10-CM

## 2023-03-31 DIAGNOSIS — E78.1 HYPERTRIGLYCERIDEMIA: ICD-10-CM

## 2023-03-31 LAB
ANION GAP SERPL CALCULATED.3IONS-SCNC: 11 MMOL/L (ref 7–15)
BUN SERPL-MCNC: 14.6 MG/DL (ref 6–20)
CALCIUM SERPL-MCNC: 9.5 MG/DL (ref 8.6–10)
CHLORIDE SERPL-SCNC: 99 MMOL/L (ref 98–107)
CHOLEST SERPL-MCNC: 192 MG/DL
CREAT SERPL-MCNC: 1.03 MG/DL (ref 0.67–1.17)
DEPRECATED HCO3 PLAS-SCNC: 28 MMOL/L (ref 22–29)
ERYTHROCYTE [DISTWIDTH] IN BLOOD BY AUTOMATED COUNT: 13.2 % (ref 10–15)
GFR SERPL CREATININE-BSD FRML MDRD: 90 ML/MIN/1.73M2
GLUCOSE SERPL-MCNC: 118 MG/DL (ref 70–99)
HCT VFR BLD AUTO: 48.6 % (ref 40–53)
HDLC SERPL-MCNC: 37 MG/DL
HGB BLD-MCNC: 16.6 G/DL (ref 13.3–17.7)
LDLC SERPL CALC-MCNC: 109 MG/DL
MCH RBC QN AUTO: 30.2 PG (ref 26.5–33)
MCHC RBC AUTO-ENTMCNC: 34.2 G/DL (ref 31.5–36.5)
MCV RBC AUTO: 89 FL (ref 78–100)
NONHDLC SERPL-MCNC: 155 MG/DL
PLATELET # BLD AUTO: 239 10E3/UL (ref 150–450)
POTASSIUM SERPL-SCNC: 4.5 MMOL/L (ref 3.4–5.3)
RBC # BLD AUTO: 5.49 10E6/UL (ref 4.4–5.9)
SODIUM SERPL-SCNC: 138 MMOL/L (ref 136–145)
TRIGL SERPL-MCNC: 231 MG/DL
WBC # BLD AUTO: 7.1 10E3/UL (ref 4–11)

## 2023-03-31 PROCEDURE — 36415 COLL VENOUS BLD VENIPUNCTURE: CPT

## 2023-03-31 PROCEDURE — 85027 COMPLETE CBC AUTOMATED: CPT

## 2023-03-31 PROCEDURE — 80048 BASIC METABOLIC PNL TOTAL CA: CPT

## 2023-03-31 PROCEDURE — 80061 LIPID PANEL: CPT

## 2023-04-09 ENCOUNTER — HEALTH MAINTENANCE LETTER (OUTPATIENT)
Age: 48
End: 2023-04-09

## 2023-04-16 ENCOUNTER — OFFICE VISIT (OUTPATIENT)
Dept: URGENT CARE | Facility: URGENT CARE | Age: 48
End: 2023-04-16
Payer: COMMERCIAL

## 2023-04-16 VITALS
HEART RATE: 85 BPM | WEIGHT: 315 LBS | SYSTOLIC BLOOD PRESSURE: 120 MMHG | OXYGEN SATURATION: 96 % | DIASTOLIC BLOOD PRESSURE: 82 MMHG | BODY MASS INDEX: 40 KG/M2 | TEMPERATURE: 97.8 F

## 2023-04-16 DIAGNOSIS — J45.41 MODERATE PERSISTENT ASTHMA WITH EXACERBATION: Primary | ICD-10-CM

## 2023-04-16 DIAGNOSIS — M94.0 COSTOCHONDRITIS: ICD-10-CM

## 2023-04-16 PROCEDURE — 99214 OFFICE O/P EST MOD 30 MIN: CPT | Mod: 25

## 2023-04-16 PROCEDURE — 94640 AIRWAY INHALATION TREATMENT: CPT

## 2023-04-16 RX ORDER — DEXAMETHASONE 6 MG/1
10 TABLET ORAL ONCE
Qty: 2 TABLET | Refills: 0 | Status: SHIPPED | OUTPATIENT
Start: 2023-04-19 | End: 2023-12-15

## 2023-04-16 RX ORDER — CYCLOBENZAPRINE HCL 10 MG
10 TABLET ORAL 3 TIMES DAILY PRN
Qty: 6 TABLET | Refills: 0 | Status: SHIPPED | OUTPATIENT
Start: 2023-04-16 | End: 2023-12-15

## 2023-04-16 RX ORDER — FLUTICASONE PROPIONATE 220 UG/1
1 AEROSOL, METERED RESPIRATORY (INHALATION) 2 TIMES DAILY
Qty: 12 G | Refills: 0 | Status: SHIPPED | OUTPATIENT
Start: 2023-04-16 | End: 2023-12-15

## 2023-04-16 RX ORDER — BENZONATATE 200 MG/1
200 CAPSULE ORAL 3 TIMES DAILY PRN
Qty: 30 CAPSULE | Refills: 0 | Status: SHIPPED | OUTPATIENT
Start: 2023-04-16 | End: 2023-12-15

## 2023-04-16 RX ORDER — DEXAMETHASONE SODIUM PHOSPHATE 10 MG/ML
10 INJECTION INTRAMUSCULAR; INTRAVENOUS ONCE
Status: COMPLETED | OUTPATIENT
Start: 2023-04-16 | End: 2023-04-16

## 2023-04-16 RX ORDER — IPRATROPIUM BROMIDE AND ALBUTEROL SULFATE 2.5; .5 MG/3ML; MG/3ML
3 SOLUTION RESPIRATORY (INHALATION) ONCE
Status: COMPLETED | OUTPATIENT
Start: 2023-04-16 | End: 2023-04-16

## 2023-04-16 RX ADMIN — DEXAMETHASONE SODIUM PHOSPHATE 10 MG: 10 INJECTION INTRAMUSCULAR; INTRAVENOUS at 11:04

## 2023-04-16 RX ADMIN — IPRATROPIUM BROMIDE AND ALBUTEROL SULFATE 3 ML: 2.5; .5 SOLUTION RESPIRATORY (INHALATION) at 11:02

## 2023-04-16 NOTE — PROGRESS NOTES
URGENT CARE  Assessment & Plan   Assessment:   Mark Conn is a 47 year old male who's clinical presentation today is consistent with:   1. Moderate persistent asthma with exacerbation  - dexamethasone (DECADRON) injectable solution used ORALLY 10 mg  - ipratropium - albuterol 0.5 mg/2.5 mg/3 mL (DUONEB) neb solution 3 mL  - dexamethasone (DECADRON) 6 MG tablet; Take 1.75 tablets (10.5 mg) by mouth   - fluticasone (FLOVENT HFA) 220 MCG/ACT inhaler; Inhale 1 puff into the lungs 2     2. Costochondritis  - benzonatate (TESSALON) 200 MG capsule; Take 1 capsule (200 mg) by mouth 3 times daily as needed for cough  Dispense: 30 capsule; Refill: 0  - cyclobenzaprine (FLEXERIL) 10 MG tablet; Take 1 tablet (10 mg) by mouth 3 times daily as needed for muscle spasms  Dispense: 6 tablet; Refill: 0    No alarm signs or symptoms present     Plan:  Patient was clinically administered treatments of : duo neb and decadron, post treatment patient's wheezing was improved    No suspicion for lung infection pathology, thus, no swabs ordered today.   Patient's asthma exacerbation appears to be triggered by acute viral UR, and thus, will treat patient's asthma exacerbation today w/ oral steroids, side effects of medications reviewed.   Also encouraged her to continue to use her home medications; including their rescue inhaler  Additionally we discussed the importance of removal of any environmental factors contributing to the exacerbation   Educated patient to follow up w/ PCP for further evaluation and treatment and specifically to discuss an asthma action treatment plan, so she can have at home treatment options for exacerbations in the future to help avoid treatment delays.  We discussed if symptoms do not improve after starting today's treatment (or if symptoms worsen) to follow up in 2-3 days.     Patient  is  agreeable to treatment plan and state they will follow-up if symptoms do not improve and/or if symptoms worsen (see  "patient's AVS 'monitor for' section for specific patient instructions given and discussed regarding what to watch for and when to follow up)    Medications ordered are listed above, please see AVS for patient's specific and personalized discharge instructions given     BLAISE Martell [facility]      ______________________________________________________________________        Subjective  Subjective     HPI: Mark Conn \"Kamran\"} is a 47 year old  male who presents today for evaluation the following concerns:   Patient presents today for evaluation of wheezing}, patient has a history of asthma.   Patient states his onnset was yesterday, 3 day ago, on 4/13/23.    he states she is having coughing, and increased wheezing.    Per patient asthma symptoms occur on a seasonally  basis and current daily symptom frequency is:continusously since it started 3 days ago,  Activity limitation due to asthma: moderate, patient states he has difficulty    Current symptoms include: exacerbation of symptoms wheezing, and a  nonproductive cough  Patient denies any: chest tightness and chest pain, patient denies feeling so short of breath that they can't breathe. Patient denies any recent fevers or chills.   Patient does endorse he has been coughing so hard he thinks he pulled a muscle in his back. Patient endorses moderate back pain on the left side post coughing yesterday   Patient did not want any swabs or testing today for infectious etiology       Allergies   Allergen Reactions     Ampicillin Rash     Codeine Rash     Erythromycin Rash     Keflex [Cephalexin Monohydrate] Rash     Penicillins Rash     Sulfa Drugs Rash     Patient Active Problem List   Diagnosis     Pain in limb     Colitis     Hypertriglyceridemia     Malabsorption of glucose     CARDIOVASCULAR SCREENING; LDL GOAL LESS THAN 130     Benign essential hypertension     Obesity     Chronic gout without tophus, unspecified cause, " unspecified site     Restrictive lung disease     Moderate persistent asthma, unspecified whether complicated     Hypoglycemia     Obesity (BMI 35.0-39.9) with comorbidity (H)     SVT (supraventricular tachycardia) (H)       Review of Systems:  Pertinent review of systems as reflected in HPI, otherwise negative.     Objective  Objective    Physical Exam:  Vitals:    04/16/23 0911   BP: 120/82   Pulse: 85   Temp: 97.8  F (36.6  C)   TempSrc: Tympanic   SpO2: 96%   Weight: 145.2 kg (320 lb)      General: Alert and oriented, no acute distress, Vital signs reviewed: afebrile,  normotensive   Psy/mental status: Cooperative, nonanxious  SKIN: Intact, no rashes  EYES: EOMs intact, PERRLA bilaterally   Conjunctiva: Clear bilaterally, no injection or erythema present  EARS: TMs intact, translucent gray in color with normal landmarks present no erythema  or bulging tympanic membrane   Canals are without swelling, however have a mild amount of cerumen, no impaction  NOSE:  mucosa erythematous bilaterally with a mild amount of rhinorrhea, clear  discharge}               No frontal or maxillary sinus tenderness present bilaterally  MOUTH/THROAT: lips, tongue, & oral mucosa appear normal upon inspection                Posterior oropharynx is erythematous but without exudate, lesions or tonsillar  Edema, no dysphonia, no unilateral tonsillar edema, no uvular deviation,   no signs of peritonsillar abscess  NECK: supple, has full range of motion with no meningeal signs              No lymphadenopathy present  LUNG: normal work of breathing, good respiratory effort without retractions, good air  movement, non labored, inspection reveals normal chest expansion w/  inspiration            Lung sounds have expiratory wheezes  to auscultation bilaterally and throughout,            No rales/rhonic/crackles noted           cough noted as harsh but non productive       I explained my diagnostic considerations and recommendations to the  patient, who voiced understanding and agreement with the treatment plan.   All questions were answered.   We discussed potential side effects, risks and benefits of any prescribed or recommended therapies, as well as expectations for response to treatments.  Please see AVS for any patient instructions & handouts given.   Patient was advised to contact the Nurse Care Line, their Primary Care provider, Urgent Care, or the Emergency Department if there are new or worsening symptoms, or call 911 for emergencies.        ______________________________________________________________________          Patient Instructions   Diagnosis: asthma exacerbation   Today we did:  Neb and steroid in clinic today   Plan:     Steroids} today dexamethasone 10 once again in 3 days   a. Side effects include: insomnia, hunger, agitation, and mood alteration     Beta blocker inhaler, ICS inhaler, atrovent (ipratropium bromide/ inhaled anticholinergic)     Continue to take your home medications: controller and rescue inhaler, medications: antihistamines; nebulizer     Follow up with your PCP vs seeing a respiratory speciality provider regarding developing a personalized asthma action treatment plan for at home care     Monitor for triggers and avoid   Monitor for:     Delayed or no response to therapy     Increased or worsening respiratory status     SOB, inability to speak in full sentences     Signs of an infection: fever, purulent sputum    Drowsiness, lethargy         ASTHMA TREATMENT and Exacerbations   Asthma is a common lung disease affecting millions of people worldwide.   It is characterized by narrowing of the airways in the lungs    Symptoms of asthma include:   wheezing, coughing, chest tightness, and shortness of breath.   These symptoms tend to come and go and are related to the degree of airway narrowing in the lungs.   Different things can trigger symptoms in people with asthma, including:  viral illnesses (eg, the common  "cold), allergens, exercise, medications, or environmental conditions.  Living with asthma can be challenging, but it is possible to manage it successfully with medications and other measures.   The goals of asthma treatment are to control symptoms as well as possible and prevent asthma attacks (also called \"exacerbations\")  Action plan -- An asthma \"action plan\" is a system that can help you monitor your symptoms and know what to do when they happen.   An action plan can tell you when to add or increase medications, when to call your provider, and when to get immediate emergency help.   This can help you, or your family members, know what to do in the event of an asthma attack.  Action plans usually include three categories, based on your symptoms   ?Green - Green means your lungs are functioning well.   When symptoms are not present or are well controlled, you can typically continue your regular medicines and activities.  ?Yellow - Yellow means your airways are somewhat narrowed, making it difficult to move air in and out;   asthma symptoms may be more frequent or more severe.   This is usually treated with a short-term change or increase in medication.   ?Red - Red means your airways are severely narrowed and symptoms are severe;   this requires immediate treatment, often with several medications.  Controlling asthma triggers -- The factors that set off and worsen asthma symptoms are called \"triggers.\"   Identifying and avoiding your asthma triggers is essential in keeping symptoms under control.   Common asthma triggers generally fall into several categories:  ?Allergens, including dust, pollen, mold, cockroaches, mice, cats, and dogs  ?Respiratory infections, such as the common cold or the flu  ?Irritants, such as tobacco smoke, chemicals, and strong odors or fumes  ?Physical activity, especially if you are breathing cold or dry air while exercising  ?Certain medications, including beta blockers (used to treat " high blood pressure)  ?Emotional stress  ?Hormonal changes related to the menstrual cycle (in some women)

## 2023-04-16 NOTE — PATIENT INSTRUCTIONS
"Diagnosis: asthma exacerbation   Today we did:  Neb and steroid in clinic today   Plan:   Steroids} today dexamethasone 10 once again in 3 days   Side effects include: insomnia, hunger, agitation, and mood alteration   Beta blocker inhaler, ICS inhaler, atrovent (ipratropium bromide/ inhaled anticholinergic)   Continue to take your home medications: controller and rescue inhaler, medications: antihistamines; nebulizer   Follow up with your PCP vs seeing a respiratory speciality provider regarding developing a personalized asthma action treatment plan for at home care   Monitor for triggers and avoid   Monitor for:   Delayed or no response to therapy   Increased or worsening respiratory status   SOB, inability to speak in full sentences   Signs of an infection: fever, purulent sputum  Drowsiness, lethargy         ASTHMA TREATMENT and Exacerbations   Asthma is a common lung disease affecting millions of people worldwide.   It is characterized by narrowing of the airways in the lungs    Symptoms of asthma include:   wheezing, coughing, chest tightness, and shortness of breath.   These symptoms tend to come and go and are related to the degree of airway narrowing in the lungs.   Different things can trigger symptoms in people with asthma, including:  viral illnesses (eg, the common cold), allergens, exercise, medications, or environmental conditions.  Living with asthma can be challenging, but it is possible to manage it successfully with medications and other measures.   The goals of asthma treatment are to control symptoms as well as possible and prevent asthma attacks (also called \"exacerbations\")  Action plan -- An asthma \"action plan\" is a system that can help you monitor your symptoms and know what to do when they happen.   An action plan can tell you when to add or increase medications, when to call your provider, and when to get immediate emergency help.   This can help you, or your family members, know what to " "do in the event of an asthma attack.  Action plans usually include three categories, based on your symptoms   ?Green - Green means your lungs are functioning well.   When symptoms are not present or are well controlled, you can typically continue your regular medicines and activities.  ?Yellow - Yellow means your airways are somewhat narrowed, making it difficult to move air in and out;   asthma symptoms may be more frequent or more severe.   This is usually treated with a short-term change or increase in medication.   ?Red - Red means your airways are severely narrowed and symptoms are severe;   this requires immediate treatment, often with several medications.  Controlling asthma triggers -- The factors that set off and worsen asthma symptoms are called \"triggers.\"   Identifying and avoiding your asthma triggers is essential in keeping symptoms under control.   Common asthma triggers generally fall into several categories:  ?Allergens, including dust, pollen, mold, cockroaches, mice, cats, and dogs  ?Respiratory infections, such as the common cold or the flu  ?Irritants, such as tobacco smoke, chemicals, and strong odors or fumes  ?Physical activity, especially if you are breathing cold or dry air while exercising  ?Certain medications, including beta blockers (used to treat high blood pressure)  ?Emotional stress  ?Hormonal changes related to the menstrual cycle (in some women)    "

## 2023-06-10 ENCOUNTER — OFFICE VISIT (OUTPATIENT)
Dept: URGENT CARE | Facility: URGENT CARE | Age: 48
End: 2023-06-10
Payer: COMMERCIAL

## 2023-06-10 VITALS
RESPIRATION RATE: 28 BRPM | SYSTOLIC BLOOD PRESSURE: 124 MMHG | DIASTOLIC BLOOD PRESSURE: 84 MMHG | TEMPERATURE: 97.2 F | HEIGHT: 75 IN | WEIGHT: 315 LBS | BODY MASS INDEX: 39.17 KG/M2 | OXYGEN SATURATION: 96 % | HEART RATE: 86 BPM

## 2023-06-10 DIAGNOSIS — J45.41 MODERATE PERSISTENT ASTHMA WITH EXACERBATION: Primary | ICD-10-CM

## 2023-06-10 PROCEDURE — 99214 OFFICE O/P EST MOD 30 MIN: CPT | Mod: 25

## 2023-06-10 PROCEDURE — 94640 AIRWAY INHALATION TREATMENT: CPT

## 2023-06-10 RX ORDER — DEXAMETHASONE 4 MG/1
6 TABLET ORAL ONCE
Qty: 2 TABLET | Refills: 0 | Status: SHIPPED | OUTPATIENT
Start: 2023-06-10 | End: 2023-12-15

## 2023-06-10 RX ORDER — FLUTICASONE PROPIONATE 220 UG/1
1 AEROSOL, METERED RESPIRATORY (INHALATION) 2 TIMES DAILY
Qty: 12 G | Refills: 0 | Status: SHIPPED | OUTPATIENT
Start: 2023-06-10 | End: 2023-12-15

## 2023-06-10 RX ORDER — IPRATROPIUM BROMIDE AND ALBUTEROL SULFATE 2.5; .5 MG/3ML; MG/3ML
3 SOLUTION RESPIRATORY (INHALATION) ONCE
Status: COMPLETED | OUTPATIENT
Start: 2023-06-10 | End: 2023-06-10

## 2023-06-10 RX ORDER — DEXAMETHASONE SODIUM PHOSPHATE 10 MG/ML
6 INJECTION INTRAMUSCULAR; INTRAVENOUS ONCE
Status: COMPLETED | OUTPATIENT
Start: 2023-06-10 | End: 2023-06-10

## 2023-06-10 RX ADMIN — DEXAMETHASONE SODIUM PHOSPHATE 6 MG: 10 INJECTION INTRAMUSCULAR; INTRAVENOUS at 09:37

## 2023-06-10 RX ADMIN — IPRATROPIUM BROMIDE AND ALBUTEROL SULFATE 3 ML: 2.5; .5 SOLUTION RESPIRATORY (INHALATION) at 09:38

## 2023-06-10 ASSESSMENT — ASTHMA QUESTIONNAIRES
QUESTION_4 LAST FOUR WEEKS HOW OFTEN HAVE YOU USED YOUR RESCUE INHALER OR NEBULIZER MEDICATION (SUCH AS ALBUTEROL): TWO OR THREE TIMES PER WEEK
QUESTION_2 LAST FOUR WEEKS HOW OFTEN HAVE YOU HAD SHORTNESS OF BREATH: THREE TO SIX TIMES A WEEK
ACT_TOTALSCORE: 19
QUESTION_1 LAST FOUR WEEKS HOW MUCH OF THE TIME DID YOUR ASTHMA KEEP YOU FROM GETTING AS MUCH DONE AT WORK, SCHOOL OR AT HOME: NONE OF THE TIME
ACT_TOTALSCORE: 19
QUESTION_3 LAST FOUR WEEKS HOW OFTEN DID YOUR ASTHMA SYMPTOMS (WHEEZING, COUGHING, SHORTNESS OF BREATH, CHEST TIGHTNESS OR PAIN) WAKE YOU UP AT NIGHT OR EARLIER THAN USUAL IN THE MORNING: NOT AT ALL
QUESTION_5 LAST FOUR WEEKS HOW WOULD YOU RATE YOUR ASTHMA CONTROL: SOMEWHAT CONTROLLED

## 2023-06-10 ASSESSMENT — PAIN SCALES - GENERAL: PAINLEVEL: NO PAIN (0)

## 2023-06-10 NOTE — PATIENT INSTRUCTIONS
"Diagnosis: asthma exacerbation   Today we did:  Neb and steroid   Plan:   Continue with Steroids  Side effects include: insomnia, hunger, agitation, and mood alteration   Beta blocker inhaler, ICS inhaler, atrovent (ipratropium bromide/ inhaled anticholinergic)   Continue to take your home medications: controller and rescue inhaler, medications: antihistamines; nebulizer   Follow up with your PCP vs seeing a respiratory speciality provider regarding developing a personalized asthma action treatment plan for at home care   Monitor for triggers and avoid   Monitor for:   Delayed or no response to therapy   Increased or worsening respiratory status   SOB, inability to speak in full sentences   Signs of an infection: fever, purulent sputum  Drowsiness, lethargy         ASTHMA TREATMENT and Exacerbations   Asthma is a common lung disease affecting millions of people worldwide.   It is characterized by narrowing of the airways in the lungs    Symptoms of asthma include:   wheezing, coughing, chest tightness, and shortness of breath.   These symptoms tend to come and go and are related to the degree of airway narrowing in the lungs.   Different things can trigger symptoms in people with asthma, including:  viral illnesses (eg, the common cold), allergens, exercise, medications, or environmental conditions.  Living with asthma can be challenging, but it is possible to manage it successfully with medications and other measures.   The goals of asthma treatment are to control symptoms as well as possible and prevent asthma attacks (also called \"exacerbations\")  Action plan -- An asthma \"action plan\" is a system that can help you monitor your symptoms and know what to do when they happen.   An action plan can tell you when to add or increase medications, when to call your provider, and when to get immediate emergency help.   This can help you, or your family members, know what to do in the event of an asthma attack.  Action " "plans usually include three categories, based on your symptoms   ?Green - Green means your lungs are functioning well.   When symptoms are not present or are well controlled, you can typically continue your regular medicines and activities.  ?Yellow - Yellow means your airways are somewhat narrowed, making it difficult to move air in and out;   asthma symptoms may be more frequent or more severe.   This is usually treated with a short-term change or increase in medication.   ?Red - Red means your airways are severely narrowed and symptoms are severe;   this requires immediate treatment, often with several medications.  Controlling asthma triggers -- The factors that set off and worsen asthma symptoms are called \"triggers.\"   Identifying and avoiding your asthma triggers is essential in keeping symptoms under control.   Common asthma triggers generally fall into several categories:  ?Allergens, including dust, pollen, mold, cockroaches, mice, cats, and dogs  ?Respiratory infections, such as the common cold or the flu  ?Irritants, such as tobacco smoke, chemicals, and strong odors or fumes  ?Physical activity, especially if you are breathing cold or dry air while exercising  ?Certain medications, including beta blockers (used to treat high blood pressure)  ?Emotional stress  ?Hormonal changes related to the menstrual cycle (in some women)    "

## 2023-06-10 NOTE — PROGRESS NOTES
URGENT CARE  Assessment & Plan   Assessment:   Mark Conn is a 47 year old male who's clinical presentation today is consistent with:   1. Moderate persistent asthma with exacerbation  - ipratropium - albuterol 0.5 mg/2.5 mg/3 mL (DUONEB) neb solution 3 mL  - dexamethasone (DECADRON) injectable solution used ORALLY 6 mg  - fluticasone (FLOVENT HFA) 220 MCG/ACT inhaler; Inhale 1 puff into the   - dexamethasone (DECADRON) 4 MG tablet; Take 1.5 tablets (6 mg) by   No alarm signs or symptoms present     Plan:  Patient was clinically administered treatments of : duo- neb and decadron, post administration his wheezing was much improved    No suspicion for lung infection pathology, thus, no testing/swabs ordered today for URI, COVID, pneumonia   will treat patient's asthma exacerbation today w/ oral steroids and inhaliers side effects of medications reviewed.   Also encouraged her to continue to use her home medications; including their controller and rescue inhaler, nebs and montelukast and antihistamines}  Additionally we discussed the importance of removal of any environmental factors contributing to the exacerbation, however suspicious that that poor air quality conditions currently affecting our area are contributing to his symptoms   Educated patient to follow up w/ PCP for further evaluation and treatment and specifically to discuss an asthma action treatment plan, so she can have at home treatment options for exacerbations in the future to help avoid treatment delays.  We discussed if symptoms do not improve after starting today's treatment (or if symptoms worsen) to follow up in 3-5 days.    Patient  is  agreeable to treatment plan and state they will follow-up if symptoms do not improve and/or if symptoms worsen (see patient's AVS 'monitor for' section for specific patient instructions given and discussed regarding what to watch for and when to follow up)    Medications ordered are listed above, please see  "AVS for patient's specific and personalized discharge instructions given     BLAISE Martell [facility]      ______________________________________________________________________        Subjective  Subjective     HPI: Mark Conn \"Kamran\"} is a 47 year old  male who presents today for evaluation the following concerns:   Patient presents today for evaluation of wheezing}, patient has a history of asthma.   Patient states his onnset was yesterday, 1 day ago, on 6/9/23.    he states she is having coughing, and increased wheezing.     Per patient asthma symptoms occur on a episodic basis  Activity limitation due to asthma: moderate   Current symptoms include: exacerbation of symptoms with wheezing, and  nonproductive cough,along with breathlessness}  Patient denies any: chest tightness and chest pain, patient denies feeling so short of breath that they can't breathe. Patient denies any recent fevers or chills.   he is not concerned about covid or other URI conditions at this time.      Allergies   Allergen Reactions     Ampicillin Rash     Erythromycin Rash     Keflex [Cephalexin Monohydrate] Rash     Morphine And Related Rash     Penicillins Rash     Sulfa Antibiotics Rash     Patient Active Problem List   Diagnosis     Pain in limb     Colitis     Hypertriglyceridemia     Malabsorption of glucose     CARDIOVASCULAR SCREENING; LDL GOAL LESS THAN 130     Benign essential hypertension     Obesity     Chronic gout without tophus, unspecified cause, unspecified site     Restrictive lung disease     Moderate persistent asthma, unspecified whether complicated     Hypoglycemia     Obesity (BMI 35.0-39.9) with comorbidity (H)     SVT (supraventricular tachycardia) (H)       Review of Systems:  Pertinent review of systems as reflected in HPI, otherwise negative.     Objective  Objective    Physical Exam:  Vitals:    06/10/23 0911   BP: 124/84   Pulse: 86   Resp: 28   Temp: 97.2  F (36.2 " " C)   TempSrc: Tympanic   SpO2: 96%   Weight: 144.7 kg (319 lb)   Height: 1.905 m (6' 3\")      General: Alert and oriented, no acute distress, Vital signs reviewed: afebrile,  normotensive   Psy/mental status: Cooperative, nonanxious  SKIN: Intact, no rashes  EYES: EOMs intact, PERRLA bilaterally   Conjunctiva: Clear bilaterally, no injection or erythema present  NOSE:  mucosa moist               No frontal or maxillary sinus tenderness present bilaterally  MOUTH/THROAT: lips, tongue, & oral mucosa appear normal upon inspection                Posterior oropharynx is wnl   NECK: supple, has full range of motion with no meningeal signs              No lymphadenopathy present  LUNG: normal work of breathing, good respiratory effort without retractions, good air  movement, non labored, inspection reveals normal chest expansion w/  inspiration            Lung sounds have inspiratory and expiratory wheezes to auscultation bilaterally,            No rales/rhonic/crackles noted           cough noted has harsh but non productive         I explained my diagnostic considerations and recommendations to the patient, who voiced understanding and agreement with the treatment plan.   All questions were answered.   We discussed potential side effects, risks and benefits of any prescribed or recommended therapies, as well as expectations for response to treatments.  Please see AVS for any patient instructions & handouts given.   Patient was advised to contact the Nurse Care Line, their Primary Care provider, Urgent Care, or the Emergency Department if there are new or worsening symptoms, or call 911 for emergencies.        ______________________________________________________________________        Patient Instructions   Diagnosis: asthma exacerbation   Today we did:  Neb and steroid   Plan:     Continue with Steroids  a. Side effects include: insomnia, hunger, agitation, and mood alteration     Beta blocker inhaler, ICS inhaler, " "atrovent (ipratropium bromide/ inhaled anticholinergic)     Continue to take your home medications: controller and rescue inhaler, medications: antihistamines; nebulizer     Follow up with your PCP vs seeing a respiratory speciality provider regarding developing a personalized asthma action treatment plan for at home care     Monitor for triggers and avoid   Monitor for:     Delayed or no response to therapy     Increased or worsening respiratory status     SOB, inability to speak in full sentences     Signs of an infection: fever, purulent sputum    Drowsiness, lethargy         ASTHMA TREATMENT and Exacerbations   Asthma is a common lung disease affecting millions of people worldwide.   It is characterized by narrowing of the airways in the lungs    Symptoms of asthma include:   wheezing, coughing, chest tightness, and shortness of breath.   These symptoms tend to come and go and are related to the degree of airway narrowing in the lungs.   Different things can trigger symptoms in people with asthma, including:  viral illnesses (eg, the common cold), allergens, exercise, medications, or environmental conditions.  Living with asthma can be challenging, but it is possible to manage it successfully with medications and other measures.   The goals of asthma treatment are to control symptoms as well as possible and prevent asthma attacks (also called \"exacerbations\")  Action plan -- An asthma \"action plan\" is a system that can help you monitor your symptoms and know what to do when they happen.   An action plan can tell you when to add or increase medications, when to call your provider, and when to get immediate emergency help.   This can help you, or your family members, know what to do in the event of an asthma attack.  Action plans usually include three categories, based on your symptoms   ?Green - Green means your lungs are functioning well.   When symptoms are not present or are well controlled, you can typically " "continue your regular medicines and activities.  ?Yellow - Yellow means your airways are somewhat narrowed, making it difficult to move air in and out;   asthma symptoms may be more frequent or more severe.   This is usually treated with a short-term change or increase in medication.   ?Red - Red means your airways are severely narrowed and symptoms are severe;   this requires immediate treatment, often with several medications.  Controlling asthma triggers -- The factors that set off and worsen asthma symptoms are called \"triggers.\"   Identifying and avoiding your asthma triggers is essential in keeping symptoms under control.   Common asthma triggers generally fall into several categories:  ?Allergens, including dust, pollen, mold, cockroaches, mice, cats, and dogs  ?Respiratory infections, such as the common cold or the flu  ?Irritants, such as tobacco smoke, chemicals, and strong odors or fumes  ?Physical activity, especially if you are breathing cold or dry air while exercising  ?Certain medications, including beta blockers (used to treat high blood pressure)  ?Emotional stress  ?Hormonal changes related to the menstrual cycle (in some women)        "

## 2023-06-20 DIAGNOSIS — G62.9 NEUROPATHY: ICD-10-CM

## 2023-06-21 RX ORDER — GABAPENTIN 300 MG/1
CAPSULE ORAL
Qty: 90 CAPSULE | Refills: 0 | OUTPATIENT
Start: 2023-06-21

## 2023-06-21 NOTE — TELEPHONE ENCOUNTER
Spoke with [facility], Patient has gabapentin refills on file. Wrong Rx number must have been called in by patient. They will fill current Rx.  Radha PADILLA RN

## 2023-10-15 NOTE — TELEPHONE ENCOUNTER
The cost of the flovent hfa is over $200 and the patient is not going to pay that.  He is wondering if there was something else we could switch this to that may be covered better under his insurance. Possibly Qvar, Arunity ellipta, Asmanex.      Thank You!  Vesta Saldivar  [facility]  P: [phone] F:[phone]   Attending Attestation (For Attendings USE Only)...

## 2023-12-05 ENCOUNTER — TELEPHONE (OUTPATIENT)
Dept: FAMILY MEDICINE | Facility: CLINIC | Age: 48
End: 2023-12-05
Payer: COMMERCIAL

## 2023-12-05 NOTE — TELEPHONE ENCOUNTER
Patient Quality Outreach    Patient is due for the following:   Asthma  -  ACT needed    Next Steps:   Patient needs to complete an ACT.    Type of outreach:    Phone, spoke to patient/parent. He states that he has been struggling with his asthma lately. Scheduled him an appointment with his provider.      Questions for provider review:    None           Domenica Dejesus, [facility]

## 2023-12-15 ENCOUNTER — OFFICE VISIT (OUTPATIENT)
Dept: URGENT CARE | Facility: URGENT CARE | Age: 48
End: 2023-12-15
Payer: COMMERCIAL

## 2023-12-15 VITALS
OXYGEN SATURATION: 96 % | DIASTOLIC BLOOD PRESSURE: 86 MMHG | HEART RATE: 87 BPM | SYSTOLIC BLOOD PRESSURE: 125 MMHG | BODY MASS INDEX: 39.87 KG/M2 | WEIGHT: 315 LBS | TEMPERATURE: 97.4 F

## 2023-12-15 DIAGNOSIS — R06.2 WHEEZING: ICD-10-CM

## 2023-12-15 DIAGNOSIS — R06.02 SHORTNESS OF BREATH: ICD-10-CM

## 2023-12-15 DIAGNOSIS — J45.41 MODERATE PERSISTENT ASTHMA WITH EXACERBATION: Primary | ICD-10-CM

## 2023-12-15 PROCEDURE — 99214 OFFICE O/P EST MOD 30 MIN: CPT | Mod: 25 | Performed by: NURSE PRACTITIONER

## 2023-12-15 PROCEDURE — 94640 AIRWAY INHALATION TREATMENT: CPT | Performed by: NURSE PRACTITIONER

## 2023-12-15 RX ORDER — PREDNISONE 20 MG/1
TABLET ORAL
Qty: 20 TABLET | Refills: 0 | Status: SHIPPED | OUTPATIENT
Start: 2023-12-15 | End: 2023-12-29

## 2023-12-15 RX ORDER — DEXAMETHASONE SODIUM PHOSPHATE 10 MG/ML
10 INJECTION, SOLUTION INTRAMUSCULAR; INTRAVENOUS ONCE
Status: COMPLETED | OUTPATIENT
Start: 2023-12-15 | End: 2023-12-15

## 2023-12-15 RX ORDER — ALBUTEROL SULFATE 90 UG/1
2 AEROSOL, METERED RESPIRATORY (INHALATION) EVERY 4 HOURS PRN
Qty: 18 G | Refills: 3 | Status: SHIPPED | OUTPATIENT
Start: 2023-12-15 | End: 2024-03-26

## 2023-12-15 RX ORDER — IPRATROPIUM BROMIDE AND ALBUTEROL SULFATE 2.5; .5 MG/3ML; MG/3ML
3 SOLUTION RESPIRATORY (INHALATION) ONCE
Status: COMPLETED | OUTPATIENT
Start: 2023-12-15 | End: 2023-12-15

## 2023-12-15 RX ADMIN — IPRATROPIUM BROMIDE AND ALBUTEROL SULFATE 3 ML: 2.5; .5 SOLUTION RESPIRATORY (INHALATION) at 11:49

## 2023-12-15 RX ADMIN — DEXAMETHASONE SODIUM PHOSPHATE 10 MG: 10 INJECTION, SOLUTION INTRAMUSCULAR; INTRAVENOUS at 11:50

## 2023-12-15 NOTE — PROGRESS NOTES
SUBJECTIVE:   Mark Conn is a 48 year old male presenting with a chief complaint of   Chief Complaint   Patient presents with    Asthma     Patient has been wheezing the past couple weeks but worse the past couple days. He has been using his inhaler without much improvement. He reports he feels that its coming more from his throat then his lungs and does have a mild cough.        Past Medical History:   Diagnosis Date    Other acne      Family History   Problem Relation Age of Onset    Hypertension Mother     Lipids Mother     Diabetes Mother     Thyroid Disease Father     Other Cancer Father 65        Gastric-spread to the bones, liver.    Diabetes Maternal Grandmother     Hypertension Maternal Grandfather     Diabetes Paternal Grandmother     Thyroid Disease Sister         Hyperthyroid.     Current Outpatient Medications   Medication Sig Dispense Refill    albuterol (PROAIR HFA/PROVENTIL HFA/VENTOLIN HFA) 108 (90 Base) MCG/ACT inhaler Inhale 2 puffs into the lungs every 4 hours as needed for shortness of breath, wheezing or cough 18 g 3    albuterol (PROAIR HFA/PROVENTIL HFA/VENTOLIN HFA) 108 (90 Base) MCG/ACT inhaler Inhale 2 puffs into the lungs every 6 hours as needed for shortness of breath / dyspnea or wheezing 18 g 3    allopurinol (ZYLOPRIM) 300 MG tablet Take 1 tablet (300 mg) by mouth daily 90 tablet 3    gabapentin (NEURONTIN) 300 MG capsule Take 1 capsule (300 mg) by mouth At Bedtime 90 capsule 3    gemfibrozil (LOPID) 600 MG tablet Take 1 tablet (600 mg) by mouth 2 times daily (before meals) 180 tablet 3    lisinopril (ZESTRIL) 20 MG tablet Take 1 tablet (20 mg) by mouth daily 90 tablet 3    metoprolol succinate ER (TOPROL XL) 50 MG 24 hr tablet TAKE ONE TABLET BY MOUTH ONCE DAILY 90 tablet 3    montelukast (SINGULAIR) 10 MG tablet Take 1 tablet (10 mg) by mouth At Bedtime 90 tablet 3    multivitamin w/minerals (THERA-VIT-M) tablet Take 1 tablet by mouth 2 times daily.      Omega-3 Fatty Acids  (OMEGA-3 FISH OIL PO)       predniSONE (DELTASONE) 20 MG tablet Take 3 tabs by mouth daily x 3 days, then 2 tabs daily x 3 days, then 1 tab daily x 3 days, then 1/2 tab daily x 3 days. 20 tablet 0     Social History     Tobacco Use    Smoking status: Never    Smokeless tobacco: Never   Substance Use Topics    Alcohol use: Yes     Comment: Sociallly- not very often.       OBJECTIVE  /86   Pulse 87   Temp 97.4  F (36.3  C) (Tympanic)   Wt 144.7 kg (319 lb)   SpO2 96%   BMI 39.87 kg/m      Physical Exam  Constitutional:       Appearance: Normal appearance.   HENT:      Mouth/Throat:      Mouth: Mucous membranes are moist.      Pharynx: Oropharynx is clear.   Eyes:      Extraocular Movements: Extraocular movements intact.      Conjunctiva/sclera: Conjunctivae normal.   Cardiovascular:      Rate and Rhythm: Normal rate and regular rhythm.      Heart sounds: Normal heart sounds.   Pulmonary:      Effort: Pulmonary effort is normal.      Breath sounds: Wheezing (pt has audible wheezing) and rhonchi (rhonchi bilat bases) present.   Musculoskeletal:      Cervical back: Neck supple.   Skin:     General: Skin is warm and dry.   Neurological:      General: No focal deficit present.      Mental Status: He is alert.   Psychiatric:         Mood and Affect: Mood normal.       Duoneb given in clinic; post neb pt had increased air exchange, less wheezing.  Decadron given orally in clinic.     ASSESSMENT:    1. Moderate persistent asthma with exacerbation    - predniSONE (DELTASONE) 20 MG tablet; Take 3 tabs by mouth daily x 3 days, then 2 tabs daily x 3 days, then 1 tab daily x 3 days, then 1/2 tab daily x 3 days.  Dispense: 20 tablet; Refill: 0  - albuterol (PROAIR HFA/PROVENTIL HFA/VENTOLIN HFA) 108 (90 Base) MCG/ACT inhaler; Inhale 2 puffs into the lungs every 4 hours as needed for shortness of breath, wheezing or cough  Dispense: 18 g; Refill: 3    2. Shortness of breath    - dexAMETHasone PF (DECADRON) injection 10  mg  - ipratropium - albuterol 0.5 mg/2.5 mg/3 mL (DUONEB) neb solution 3 mL    3. Wheezing    - dexAMETHasone PF (DECADRON) injection 10 mg  - ipratropium - albuterol 0.5 mg/2.5 mg/3 mL (DUONEB) neb solution 3 mL      PLAN:  Take prednisone as directed.   Continue albuterol inhaler every 4 hours as needed for wheeze or shortness of breath.  Follow up with your primary in a couple weeks as planned.  Be seen again if symptoms worsen.

## 2023-12-15 NOTE — PATIENT INSTRUCTIONS
Take prednisone as directed.   Continue albuterol inhaler every 4 hours as needed for wheeze or shortness of breath.  Follow up with your primary in a couple weeks as planned.  Be seen again if symptoms worsen.

## 2023-12-26 ASSESSMENT — ASTHMA QUESTIONNAIRES: ACT_TOTALSCORE: 15

## 2023-12-29 ENCOUNTER — TELEPHONE (OUTPATIENT)
Dept: FAMILY MEDICINE | Facility: CLINIC | Age: 48
End: 2023-12-29

## 2023-12-29 ENCOUNTER — OFFICE VISIT (OUTPATIENT)
Dept: FAMILY MEDICINE | Facility: CLINIC | Age: 48
End: 2023-12-29
Payer: COMMERCIAL

## 2023-12-29 VITALS
HEART RATE: 62 BPM | RESPIRATION RATE: 24 BRPM | DIASTOLIC BLOOD PRESSURE: 65 MMHG | OXYGEN SATURATION: 96 % | SYSTOLIC BLOOD PRESSURE: 113 MMHG | WEIGHT: 315 LBS | HEIGHT: 75 IN | BODY MASS INDEX: 39.17 KG/M2 | TEMPERATURE: 97.7 F

## 2023-12-29 DIAGNOSIS — E66.813 CLASS 3 SEVERE OBESITY DUE TO EXCESS CALORIES WITH SERIOUS COMORBIDITY AND BODY MASS INDEX (BMI) OF 40.0 TO 44.9 IN ADULT (H): ICD-10-CM

## 2023-12-29 DIAGNOSIS — E66.01 CLASS 3 SEVERE OBESITY DUE TO EXCESS CALORIES WITH SERIOUS COMORBIDITY AND BODY MASS INDEX (BMI) OF 40.0 TO 44.9 IN ADULT (H): ICD-10-CM

## 2023-12-29 DIAGNOSIS — E66.01 CLASS 3 SEVERE OBESITY DUE TO EXCESS CALORIES WITH SERIOUS COMORBIDITY AND BODY MASS INDEX (BMI) OF 40.0 TO 44.9 IN ADULT (H): Primary | ICD-10-CM

## 2023-12-29 DIAGNOSIS — J45.40 MODERATE PERSISTENT ASTHMA, UNSPECIFIED WHETHER COMPLICATED: Primary | ICD-10-CM

## 2023-12-29 DIAGNOSIS — E66.813 CLASS 3 SEVERE OBESITY DUE TO EXCESS CALORIES WITH SERIOUS COMORBIDITY AND BODY MASS INDEX (BMI) OF 40.0 TO 44.9 IN ADULT (H): Primary | ICD-10-CM

## 2023-12-29 DIAGNOSIS — I49.9 IRREGULAR HEART RATE: ICD-10-CM

## 2023-12-29 LAB
ANION GAP SERPL CALCULATED.3IONS-SCNC: 14 MMOL/L (ref 7–15)
BUN SERPL-MCNC: 14.6 MG/DL (ref 6–20)
CALCIUM SERPL-MCNC: 9.1 MG/DL (ref 8.6–10)
CHLORIDE SERPL-SCNC: 102 MMOL/L (ref 98–107)
CREAT SERPL-MCNC: 0.88 MG/DL (ref 0.67–1.17)
DEPRECATED HCO3 PLAS-SCNC: 23 MMOL/L (ref 22–29)
EGFRCR SERPLBLD CKD-EPI 2021: >90 ML/MIN/1.73M2
GLUCOSE SERPL-MCNC: 119 MG/DL (ref 70–99)
POTASSIUM SERPL-SCNC: 4.3 MMOL/L (ref 3.4–5.3)
SODIUM SERPL-SCNC: 139 MMOL/L (ref 135–145)
TSH SERPL DL<=0.005 MIU/L-ACNC: 1.56 UIU/ML (ref 0.3–4.2)

## 2023-12-29 PROCEDURE — 84443 ASSAY THYROID STIM HORMONE: CPT | Performed by: NURSE PRACTITIONER

## 2023-12-29 PROCEDURE — 36415 COLL VENOUS BLD VENIPUNCTURE: CPT | Performed by: NURSE PRACTITIONER

## 2023-12-29 PROCEDURE — 93000 ELECTROCARDIOGRAM COMPLETE: CPT | Performed by: NURSE PRACTITIONER

## 2023-12-29 PROCEDURE — 80048 BASIC METABOLIC PNL TOTAL CA: CPT | Performed by: NURSE PRACTITIONER

## 2023-12-29 PROCEDURE — 99214 OFFICE O/P EST MOD 30 MIN: CPT | Performed by: NURSE PRACTITIONER

## 2023-12-29 RX ORDER — TIRZEPATIDE 2.5 MG/.5ML
2.5 INJECTION, SOLUTION SUBCUTANEOUS
Qty: 2 ML | Refills: 0 | Status: SHIPPED | OUTPATIENT
Start: 2023-12-29 | End: 2024-01-26

## 2023-12-29 RX ORDER — TIRZEPATIDE 7.5 MG/.5ML
7.5 INJECTION, SOLUTION SUBCUTANEOUS
Qty: 6 ML | Refills: 3 | Status: SHIPPED | OUTPATIENT
Start: 2024-02-23 | End: 2024-02-09

## 2023-12-29 RX ORDER — TIRZEPATIDE 5 MG/.5ML
5 INJECTION, SOLUTION SUBCUTANEOUS
Qty: 2 ML | Refills: 0 | Status: SHIPPED | OUTPATIENT
Start: 2024-01-26 | End: 2024-02-09

## 2023-12-29 ASSESSMENT — PAIN SCALES - GENERAL: PAINLEVEL: NO PAIN (0)

## 2023-12-29 NOTE — TELEPHONE ENCOUNTER
Prior Authorization Retail Medication Request    Medication/Dose: MOUNJARO 5MG/0.5ML  Diagnosis and ICD code (if different than what is on RX):    New/renewal/insurance change PA/secondary ins. PA:  Previously Tried and Failed:    Rationale:      Insurance   Primary: CHRISTOPHER REED  Insurance ID:  [id number]  PHONE  [phone]    Secondary (if applicable):  Insurance ID:      Pharmacy Information (if different than what is on RX)  Name:    Phone:    Fax:        Thank you,  Elizabeth Schuler,  Pharmacy [facility]

## 2023-12-29 NOTE — TELEPHONE ENCOUNTER
Pt would like an alternate to the Qvar Redihaler as this one is very expensive.  Thanks   Davina Almanza [facility]  [phone]

## 2023-12-29 NOTE — PROGRESS NOTES
"  Assessment & Plan     Moderate persistent asthma, unspecified whether complicated  Patient has had 2 flareups is controlled now recommend starting long-acting inhaler  - beclomethasone HFA (QVAR REDIHALER) 40 MCG/ACT inhaler; Inhale 1 puff into the lungs 2 times daily  - Basic metabolic panel  (Ca, Cl, CO2, Creat, Gluc, K, Na, BUN); Future    Irregular heart rate  Patient has history of underlying SVT is being treated for to have increased heart rate today EKG did show sinus tach with PVCs stable has returned down to baseline no further intervention  - EKG 12-lead complete w/read - Clinics    Class 3 severe obesity due to excess calories with serious comorbidity and body mass index (BMI) of 40.0 to 44.9 in adult (H)  Recommend seeing if we can start weight loss medications contributing factor to his asthma flareups  - TSH with free T4 reflex; Future  - tirzepatide (MOUNJARO) 2.5 MG/0.5ML pen; Inject 2.5 mg Subcutaneous every 7 days for 28 days  - tirzepatide (MOUNJARO) 5 MG/0.5ML pen; Inject 5 mg Subcutaneous every 7 days for 28 days  - tirzepatide (MOUNJARO) 7.5 MG/0.5ML pen; Inject 7.5 mg Subcutaneous every 7 days}     BMI:   Estimated body mass index is 40.55 kg/m  as calculated from the following:    Height as of this encounter: 1.905 m (6' 3\").    Weight as of this encounter: 147.1 kg (324 lb 6.4 oz).   Weight management plan: Discussed healthy diet and exercise guidelines    See Patient Instructions    BLAISE Quinn CNP  [facility]    Ana Andrew is a 48 year old, presenting for the following health issues:  Asthma and UC Follow-Up  ED/UC Followup:    Facility:  [facility] [place]      Date of visit: 12/15/2023  Reason for visit: Moderate persistent asthma with exacerbation    Shortness of breath    Wheezing  Current Status: much better     History of Present Illness     Asthma:  He presents for follow up of asthma.  He has no cough, no " "wheezing, and some shortness of breath.  He is using a relief medication daily. He does not have a controller medication. Patient is aware of the following triggers: cold air and humidity. The patient has had a visit to the Emergency Room, Urgent Care or Hospital due to asthma since the last clinic visit. He has been to the Emergency Room or Urgent Care 1 time.He has had a Hospitalization 0 times.    Reason for visit:  Asthma & weight    He eats 0-1 servings of fruits and vegetables daily.He consumes 3 sweetened beverage(s) daily.He exercises with enough effort to increase his heart rate 30 to 60 minutes per day.  He exercises with enough effort to increase his heart rate 3 or less days per week.   He is taking medications regularly.         3/19/2023     7:08 PM 6/10/2023     9:14 AM 12/26/2023     5:58 PM   ACT Total Scores   ACT TOTAL SCORE (Goal Greater than or Equal to 20) 20 19 15   In the past 12 months, how many times did you visit the emergency room for your asthma without being admitted to the hospital? 2 0 0   In the past 12 months, how many times were you hospitalized overnight because of your asthma? 0 0 0            Review of Systems   Constitutional, HEENT, cardiovascular, pulmonary, gi and gu systems are negative, except as otherwise noted.      Objective    /65   Pulse 62   Temp 97.7  F (36.5  C)   Resp 24   Ht 1.905 m (6' 3\")   Wt 147.1 kg (324 lb 6.4 oz)   SpO2 96%   BMI 40.55 kg/m    Body mass index is 40.55 kg/m .  Physical Exam   GENERAL: healthy, alert and no distress  EYES: Eyes grossly normal to inspection, PERRL and conjunctivae and sclerae normal  HENT: ear canals and TM's normal, nose and mouth without ulcers or lesions  NECK: no adenopathy, no asymmetry, masses, or scars and thyroid normal to palpation  RESP: lungs clear to auscultation - no rales, rhonchi or wheezes  CV: regular rate and rhythm, normal S1 S2, no S3 or S4, no murmur, click or rub, no peripheral edema and " peripheral pulses strong  ABDOMEN: soft, nontender, no hepatosplenomegaly, no masses and bowel sounds normal  MS: no gross musculoskeletal defects noted, no edema  SKIN: no suspicious lesions or rashes  NEURO: Normal strength and tone, mentation intact and speech normal  PSYCH: mentation appears normal, affect normal/bright    No results found for any visits on 12/29/23.

## 2023-12-31 ENCOUNTER — TELEPHONE (OUTPATIENT)
Dept: FAMILY MEDICINE | Facility: CLINIC | Age: 48
End: 2023-12-31
Payer: COMMERCIAL

## 2023-12-31 NOTE — TELEPHONE ENCOUNTER
Prior Authorization Retail Medication Request    Medication/Dose: Wegovy 2.5mg/0.5 ml   Diagnosis and ICD code (if different than what is on RX):    New/renewal/insurance change PA/secondary ins. PA:  Previously Tried and Failed:    Rationale:      Insurance   Primary: Beny miller  Insurance ID:  [id number]    Secondary (if applicable):  Insurance ID:      Pharmacy Information (if different than what is on RX)  Name:    Phone:    Fax:      Thank you,  Estefani Brito, Pharmacy Technician  [facility] [place]    [phone]

## 2024-01-02 ENCOUNTER — TELEPHONE (OUTPATIENT)
Dept: FAMILY MEDICINE | Facility: CLINIC | Age: 49
End: 2024-01-02
Payer: COMMERCIAL

## 2024-01-02 DIAGNOSIS — J45.40 MODERATE PERSISTENT ASTHMA, UNSPECIFIED WHETHER COMPLICATED: Primary | ICD-10-CM

## 2024-01-02 RX ORDER — FLUTICASONE PROPIONATE AND SALMETEROL 250; 50 UG/1; UG/1
1 POWDER RESPIRATORY (INHALATION) EVERY 12 HOURS
Qty: 1 EACH | Refills: 3 | Status: SHIPPED | OUTPATIENT
Start: 2024-01-02 | End: 2024-01-03

## 2024-01-02 NOTE — TELEPHONE ENCOUNTER
Pt would like to switch back to qvar due to high copays   Thanks   Davina Almanza [facility]  [phone]

## 2024-01-02 NOTE — TELEPHONE ENCOUNTER
Notify Kamran's insurance plan does not cover the injectable weight loss medication as discussed we will need to follow-up with the weight loss clinic I have placed the referral they will contact you to schedule.    Magda Almanza CNP

## 2024-01-02 NOTE — TELEPHONE ENCOUNTER
Advair prescription sent and we will determine cost once it is filed through pharmacy..    Magda Almanza CNP

## 2024-01-02 NOTE — TELEPHONE ENCOUNTER
Central Prior Authorization Team   Phone: [phone]      PRIOR AUTHORIZATION DENIED    Medication: MOUNJARO 2.5 MG/0.5ML SC SOPN  Insurance Company: [employer] - Phone [phone] Fax [phone]  Denial Date: 12/30/2023  Denial Reason(s):     Note from payer: Denied.  The requested medication is not covered by your plan. Weight loss agents are excluded per plan design.  Additionally, MOUNJARO is not FDA approved for the prescribed indications.       Appeal Information:       Patient Notified: No. Please note: Providers/Clinics are to notify the patients of the denial outcomes and steps going forward.

## 2024-01-03 NOTE — TELEPHONE ENCOUNTER
PRIOR AUTHORIZATION DENIED    Medication: Mounjaro 2.5mg/0.5ml    Denial Date: 1/3/2024    Denial Rational:   The requested medication is not covered by your plan. Weight loss agents are excluded per call to plan      Appeal Information: no appeal can be done, weight loss agents are excluded from plan.

## 2024-01-10 ENCOUNTER — OFFICE VISIT (OUTPATIENT)
Dept: URGENT CARE | Facility: URGENT CARE | Age: 49
End: 2024-01-10
Payer: COMMERCIAL

## 2024-01-10 ENCOUNTER — ANCILLARY PROCEDURE (OUTPATIENT)
Dept: GENERAL RADIOLOGY | Facility: CLINIC | Age: 49
End: 2024-01-10
Attending: NURSE PRACTITIONER
Payer: COMMERCIAL

## 2024-01-10 VITALS
TEMPERATURE: 97.8 F | RESPIRATION RATE: 24 BRPM | WEIGHT: 315 LBS | OXYGEN SATURATION: 94 % | SYSTOLIC BLOOD PRESSURE: 129 MMHG | BODY MASS INDEX: 39.75 KG/M2 | DIASTOLIC BLOOD PRESSURE: 86 MMHG | HEART RATE: 93 BPM

## 2024-01-10 DIAGNOSIS — R06.2 WHEEZING: ICD-10-CM

## 2024-01-10 DIAGNOSIS — J18.9 ATYPICAL PNEUMONIA: Primary | ICD-10-CM

## 2024-01-10 DIAGNOSIS — J45.51 SEVERE PERSISTENT ASTHMA WITH EXACERBATION (H): ICD-10-CM

## 2024-01-10 PROCEDURE — 71046 X-RAY EXAM CHEST 2 VIEWS: CPT | Mod: TC | Performed by: RADIOLOGY

## 2024-01-10 PROCEDURE — 99214 OFFICE O/P EST MOD 30 MIN: CPT | Performed by: NURSE PRACTITIONER

## 2024-01-10 RX ORDER — AZITHROMYCIN 250 MG/1
TABLET, FILM COATED ORAL
Qty: 6 TABLET | Refills: 0 | Status: SHIPPED | OUTPATIENT
Start: 2024-01-10 | End: 2024-01-15

## 2024-01-10 RX ORDER — IPRATROPIUM BROMIDE AND ALBUTEROL SULFATE 2.5; .5 MG/3ML; MG/3ML
1 SOLUTION RESPIRATORY (INHALATION) EVERY 6 HOURS PRN
Qty: 90 ML | Refills: 0 | Status: SHIPPED | OUTPATIENT
Start: 2024-01-10 | End: 2024-02-05

## 2024-01-10 NOTE — PROGRESS NOTES
Assessment & Plan      Diagnosis Comments   1. Atypical pneumonia  ipratropium - albuterol 0.5 mg/2.5 mg/3 mL (DUONEB) 0.5-2.5 (3) MG/3ML neb solution, Nebulizer and Supplies Order for DME - ONLY FOR DME, amoxicillin-clavulanate (AUGMENTIN) 875-125 MG tablet, azithromycin (ZITHROMAX) 250 MG tablet       2. Severe persistent asthma with exacerbation (H28)  XR Chest 2 Views, ipratropium - albuterol 0.5 mg/2.5 mg/3 mL (DUONEB) 0.5-2.5 (3) MG/3ML neb solution, Nebulizer and Supplies Order for DME - ONLY FOR DME, amoxicillin-clavulanate (AUGMENTIN) 875-125 MG tablet, azithromycin (ZITHROMAX) 250 MG tablet       3. Wheezing  XR Chest 2 Views, Nebulizer and Supplies Order for DME - ONLY FOR DME, amoxicillin-clavulanate (AUGMENTIN) 875-125 MG tablet, azithromycin (ZITHROMAX) 250 MG tablet         Patient was given nebulizer for home use.  We discussed side effects to this medication and dosing.  Chest x-ray per radiology read concerning for atypical infectious process.  Will treat with macrolide and Augmentin per up-to-date recommendations.  Patient states that he has tolerated both of these in the past.  We discussed home care plan with rest fluids Tylenol and/or ibuprofen as needed he will follow-up with his primary care provider in 3 days if symptoms not improving we discussed red flag symptoms that would warrant emergent or urgent evaluation.      BLAISE Parker [facility]    Ana Andrew is a 48 year old male who presents to clinic today for the following health issues:  Chief Complaint   Patient presents with    Breathing Problem     Says he feels like he has phlegm caught in throat, says it  feels like can't get a good breath, has 2 inhalers not working like it should, was in 12/29 and got the second inhaler. Thinking may like to have a neb.      HPI    Patient took several at home COVID test that were negative  Patient presents to clinic states he has had increased  asthma symptoms for past month and a half.  He states that approximately 2 weeks ago he started taking Qvar daily along with his albuterol inhaler felt this was helping however approximately 2 days ago started to have symptoms of increased shortness of breath and wheezing feeling that he was having a hard time catching his breath with doing activity that previously did not cause him any symptoms.  States he has had some mucus production and cough.  Denies fever or malaise.  Denies nausea or vomiting.    Review of Systems  Constitutional, HEENT, cardiovascular, pulmonary, gi and gu systems are negative, except as otherwise noted.      Objective    /86   Pulse 93   Temp 97.8  F (36.6  C) (Tympanic)   Resp 24   Wt 144.2 kg (318 lb)   SpO2 94%   BMI 39.75 kg/m    Physical Exam   GENERAL: healthy, alert and no distress  EYES: Eyes grossly normal to inspection, PERRL and conjunctivae and sclerae normal  HENT: normal cephalic/atraumatic, ear canals and TM's normal, nose and mouth without ulcers or lesions, oropharynx clear, and oral mucous membranes moist  NECK: bilateral anterior cervical adenopathy, no asymmetry, masses, or scars, and thyroid normal to palpation  RESP: expiratory wheezes throughout and decreased breath sounds bibasilar  CV: regular rate and rhythm, normal S1 S2, no S3 or S4, no murmur, click or rub, no peripheral edema and peripheral pulses strong  ABDOMEN: soft, nontender, no hepatosplenomegaly, no masses and bowel sounds normal  MS: no gross musculoskeletal defects noted, no edema  SKIN: no suspicious lesions or rashes    Results for orders placed or performed in visit on 01/10/24   XR Chest 2 Views     Status: None    Narrative    CHEST TWO VIEWS 1/10/2024 11:40 AM     HISTORY: Severe persistent asthma with exacerbation (H28); Wheezing    COMPARISON: 1/30/2023       Impression    IMPRESSION: Stable appearance of bilateral chest wall deformities.  Patchy airspace opacities are seen along  the lower lungs that may  suggest pulmonary edema or atypical infection. Stable, enlarged  cardiomediastinal silhouette.    FANTASMA BECKWITH MD         SYSTEM ID:  [id number]

## 2024-01-13 ENCOUNTER — APPOINTMENT (OUTPATIENT)
Dept: CT IMAGING | Facility: CLINIC | Age: 49
End: 2024-01-13
Attending: EMERGENCY MEDICINE
Payer: COMMERCIAL

## 2024-01-13 ENCOUNTER — HOSPITAL ENCOUNTER (EMERGENCY)
Facility: CLINIC | Age: 49
Discharge: HOME | End: 2024-01-13
Attending: EMERGENCY MEDICINE | Admitting: EMERGENCY MEDICINE
Payer: COMMERCIAL

## 2024-01-13 VITALS
WEIGHT: 315 LBS | OXYGEN SATURATION: 90 % | BODY MASS INDEX: 39.17 KG/M2 | HEART RATE: 90 BPM | SYSTOLIC BLOOD PRESSURE: 133 MMHG | DIASTOLIC BLOOD PRESSURE: 90 MMHG | HEIGHT: 75 IN | TEMPERATURE: 98.5 F | RESPIRATION RATE: 22 BRPM

## 2024-01-13 DIAGNOSIS — R06.02 SOB (SHORTNESS OF BREATH): ICD-10-CM

## 2024-01-13 DIAGNOSIS — J98.4 RESTRICTIVE LUNG DISEASE: ICD-10-CM

## 2024-01-13 DIAGNOSIS — J45.40 MODERATE PERSISTENT ASTHMA, UNSPECIFIED WHETHER COMPLICATED: ICD-10-CM

## 2024-01-13 DIAGNOSIS — R05.1 ACUTE COUGH: ICD-10-CM

## 2024-01-13 DIAGNOSIS — J45.51 SEVERE PERSISTENT ASTHMA WITH EXACERBATION (H): ICD-10-CM

## 2024-01-13 DIAGNOSIS — R06.2 WHEEZING: ICD-10-CM

## 2024-01-13 LAB
ANION GAP SERPL CALCULATED.3IONS-SCNC: 12 MMOL/L (ref 7–15)
BASOPHILS # BLD AUTO: 0.1 10E3/UL (ref 0–0.2)
BASOPHILS NFR BLD AUTO: 1 %
BUN SERPL-MCNC: 9.6 MG/DL (ref 6–20)
CALCIUM SERPL-MCNC: 9.3 MG/DL (ref 8.6–10)
CHLORIDE SERPL-SCNC: 101 MMOL/L (ref 98–107)
CREAT SERPL-MCNC: 0.89 MG/DL (ref 0.67–1.17)
DEPRECATED HCO3 PLAS-SCNC: 24 MMOL/L (ref 22–29)
EGFRCR SERPLBLD CKD-EPI 2021: >90 ML/MIN/1.73M2
EOSINOPHIL # BLD AUTO: 0.5 10E3/UL (ref 0–0.7)
EOSINOPHIL NFR BLD AUTO: 6 %
ERYTHROCYTE [DISTWIDTH] IN BLOOD BY AUTOMATED COUNT: 13.1 % (ref 10–15)
FLUAV RNA SPEC QL NAA+PROBE: NEGATIVE
FLUBV RNA RESP QL NAA+PROBE: NEGATIVE
GLUCOSE SERPL-MCNC: 104 MG/DL (ref 70–99)
HCT VFR BLD AUTO: 48.8 % (ref 40–53)
HGB BLD-MCNC: 16.6 G/DL (ref 13.3–17.7)
HOLD SPECIMEN: NORMAL
HOLD SPECIMEN: NORMAL
IMM GRANULOCYTES # BLD: 0 10E3/UL
IMM GRANULOCYTES NFR BLD: 0 %
LYMPHOCYTES # BLD AUTO: 3.5 10E3/UL (ref 0.8–5.3)
LYMPHOCYTES NFR BLD AUTO: 38 %
MCH RBC QN AUTO: 30.3 PG (ref 26.5–33)
MCHC RBC AUTO-ENTMCNC: 34 G/DL (ref 31.5–36.5)
MCV RBC AUTO: 89 FL (ref 78–100)
MONOCYTES # BLD AUTO: 0.8 10E3/UL (ref 0–1.3)
MONOCYTES NFR BLD AUTO: 9 %
NEUTROPHILS # BLD AUTO: 4.1 10E3/UL (ref 1.6–8.3)
NEUTROPHILS NFR BLD AUTO: 46 %
NRBC # BLD AUTO: 0 10E3/UL
NRBC BLD AUTO-RTO: 0 /100
NT-PROBNP SERPL-MCNC: 99 PG/ML (ref 0–450)
PLATELET # BLD AUTO: 266 10E3/UL (ref 150–450)
POTASSIUM SERPL-SCNC: 4.2 MMOL/L (ref 3.4–5.3)
RBC # BLD AUTO: 5.48 10E6/UL (ref 4.4–5.9)
RSV RNA SPEC NAA+PROBE: NEGATIVE
SARS-COV-2 RNA RESP QL NAA+PROBE: NEGATIVE
SODIUM SERPL-SCNC: 137 MMOL/L (ref 135–145)
WBC # BLD AUTO: 9.1 10E3/UL (ref 4–11)

## 2024-01-13 PROCEDURE — 85025 COMPLETE CBC W/AUTO DIFF WBC: CPT | Performed by: EMERGENCY MEDICINE

## 2024-01-13 PROCEDURE — 250N000009 HC RX 250: Performed by: EMERGENCY MEDICINE

## 2024-01-13 PROCEDURE — 94640 AIRWAY INHALATION TREATMENT: CPT

## 2024-01-13 PROCEDURE — 87637 SARSCOV2&INF A&B&RSV AMP PRB: CPT | Performed by: FAMILY MEDICINE

## 2024-01-13 PROCEDURE — 96374 THER/PROPH/DIAG INJ IV PUSH: CPT | Performed by: EMERGENCY MEDICINE

## 2024-01-13 PROCEDURE — 36415 COLL VENOUS BLD VENIPUNCTURE: CPT | Performed by: EMERGENCY MEDICINE

## 2024-01-13 PROCEDURE — 83880 ASSAY OF NATRIURETIC PEPTIDE: CPT | Performed by: EMERGENCY MEDICINE

## 2024-01-13 PROCEDURE — 250N000011 HC RX IP 250 OP 636: Performed by: EMERGENCY MEDICINE

## 2024-01-13 PROCEDURE — 71250 CT THORAX DX C-: CPT

## 2024-01-13 PROCEDURE — 250N000013 HC RX MED GY IP 250 OP 250 PS 637: Performed by: EMERGENCY MEDICINE

## 2024-01-13 PROCEDURE — 99285 EMERGENCY DEPT VISIT HI MDM: CPT | Mod: 25 | Performed by: EMERGENCY MEDICINE

## 2024-01-13 PROCEDURE — 99284 EMERGENCY DEPT VISIT MOD MDM: CPT | Performed by: EMERGENCY MEDICINE

## 2024-01-13 PROCEDURE — 80048 BASIC METABOLIC PNL TOTAL CA: CPT | Performed by: EMERGENCY MEDICINE

## 2024-01-13 RX ORDER — PREDNISONE 10 MG/1
TABLET ORAL
Qty: 32 TABLET | Refills: 0 | Status: SHIPPED | OUTPATIENT
Start: 2024-01-13 | End: 2024-01-23

## 2024-01-13 RX ORDER — METHYLPREDNISOLONE SODIUM SUCCINATE 125 MG/2ML
125 INJECTION, POWDER, LYOPHILIZED, FOR SOLUTION INTRAMUSCULAR; INTRAVENOUS ONCE
Status: COMPLETED | OUTPATIENT
Start: 2024-01-13 | End: 2024-01-13

## 2024-01-13 RX ORDER — IPRATROPIUM BROMIDE AND ALBUTEROL SULFATE 2.5; .5 MG/3ML; MG/3ML
3 SOLUTION RESPIRATORY (INHALATION) ONCE
Status: COMPLETED | OUTPATIENT
Start: 2024-01-13 | End: 2024-01-13

## 2024-01-13 RX ORDER — ACETAMINOPHEN 500 MG
1000 TABLET ORAL ONCE
Status: COMPLETED | OUTPATIENT
Start: 2024-01-13 | End: 2024-01-13

## 2024-01-13 RX ADMIN — ACETAMINOPHEN 1000 MG: 500 TABLET, FILM COATED ORAL at 21:34

## 2024-01-13 RX ADMIN — IPRATROPIUM BROMIDE AND ALBUTEROL SULFATE 3 ML: 2.5; .5 SOLUTION RESPIRATORY (INHALATION) at 21:06

## 2024-01-13 RX ADMIN — IPRATROPIUM BROMIDE AND ALBUTEROL SULFATE 3 ML: .5; 3 SOLUTION RESPIRATORY (INHALATION) at 22:30

## 2024-01-13 RX ADMIN — METHYLPREDNISOLONE SODIUM SUCCINATE 125 MG: 125 INJECTION, POWDER, FOR SOLUTION INTRAMUSCULAR; INTRAVENOUS at 21:27

## 2024-01-13 ASSESSMENT — ACTIVITIES OF DAILY LIVING (ADL): ADLS_ACUITY_SCORE: 33

## 2024-01-14 NOTE — DISCHARGE INSTRUCTIONS
Steroids as prescribed.  Continue nebulizers and inhalers.    Follow-up in clinic as needed.  Return if worsening symptoms.  Antibiotics should be continued until completion.

## 2024-01-14 NOTE — ED PROVIDER NOTES
ED Provider Note  [facility]      History     Chief Complaint   Patient presents with    Shortness of Breath     HPI  Mark Conn is a 48 year old male who has medical history significant for obesity, hypertension, asthma, and recent antibiotic treatment for atypical type pneumonia, presenting emergency department with concerns regarding increased amounts of wheezing, and additional shortness of breath.  Patient's medical records are reviewed, and patient was seen at urgent care in [place] on January 10, 3 days ago.  Was started on Augmentin, and azithromycin at that time, and was also prescribed DuoNebs.  Patient has been using nebulizers, in addition to the antibiotics, however states that he has had increased amounts of yellowish productive sputum, with increased amounts of shortness of breath, especially over the past 1 day.  Denies any fever.  No significant lower extremity swelling.  No recent travel.  No recent procedures.  No prior history of blood clot.  No prior history of cardiac interventions, or known history of CHF.  Denies any orthopnea..  No recent steroid use        Independent Historian:        Review of External Notes:  Prior records reviewed as above      Allergies:  Allergies   Allergen Reactions    Ampicillin Rash    Erythromycin Rash    Keflex [Cephalexin Monohydrate] Rash    Morphine And Related Rash    Penicillins Rash    Sulfa Antibiotics Rash       Problem List:    Patient Active Problem List    Diagnosis Date Noted    Severe persistent asthma with exacerbation (H28) 01/10/2024     Priority: Medium    SVT (supraventricular tachycardia) 12/21/2021     Priority: Medium    Obesity (BMI 35.0-39.9) with comorbidity (H) 10/02/2019     Priority: Medium    Moderate persistent asthma, unspecified whether complicated 04/26/2018     Priority: Medium    Hypoglycemia 04/26/2018     Priority: Medium    Restrictive lung disease 08/30/2017     Priority: Medium     See  PFT August, 2017.       Chronic gout without tophus, unspecified cause, unspecified site 09/07/2016     Priority: Medium    Obesity 03/06/2015     Priority: Medium    Benign essential hypertension 03/23/2011     Priority: Medium    CARDIOVASCULAR SCREENING; LDL GOAL LESS THAN 130 10/31/2010     Priority: Medium    Hypertriglyceridemia 01/17/2010     Priority: Medium     249, Jan, 2010. 755 in Sept, 2012 and Lopid started.       Malabsorption of glucose 01/17/2010     Priority: Medium     108, Jan, 2010.   (Problem list name updated by automated process. Provider to review and confirm.)      Colitis 10/12/2009     Priority: Medium     Colonoscopy 10-12-09      Pain in limb 10/17/2006     Priority: Medium     ?lyme disease, initial screen interdeterminate, with negative WB, better on doxycycline  Repeating test in end of Nov          Past Medical History:    Past Medical History:   Diagnosis Date    Other acne        Past Surgical History:    Past Surgical History:   Procedure Laterality Date    SURGICAL HISTORY OF -       Appendectomy    SURGICAL HISTORY OF -   age 2    Hernia - Right Inguinal    SURGICAL HISTORY OF -   age 3    Pectoral-Sternum Repair     SURGICAL HISTORY OF -   08/12/85    Tonsillectomy       Family History:    Family History   Problem Relation Age of Onset    Hypertension Mother     Lipids Mother     Diabetes Mother     Thyroid Disease Father     Other Cancer Father 65        Gastric-spread to the bones, liver.    Diabetes Maternal Grandmother     Hypertension Maternal Grandfather     Diabetes Paternal Grandmother     Thyroid Disease Sister         Hyperthyroid.       Social History:  Marital Status:   [2]  Social History     Tobacco Use    Smoking status: Never    Smokeless tobacco: Never   Vaping Use    Vaping Use: Never used   Substance Use Topics    Alcohol use: Yes     Comment: Sociallly- not very often.    Drug use: No        Medications:    predniSONE (DELTASONE) 10 MG  "tablet  albuterol (PROAIR HFA/PROVENTIL HFA/VENTOLIN HFA) 108 (90 Base) MCG/ACT inhaler  albuterol (PROAIR HFA/PROVENTIL HFA/VENTOLIN HFA) 108 (90 Base) MCG/ACT inhaler  allopurinol (ZYLOPRIM) 300 MG tablet  amoxicillin-clavulanate (AUGMENTIN) 875-125 MG tablet  azithromycin (ZITHROMAX) 250 MG tablet  beclomethasone HFA (QVAR REDIHALER) 40 MCG/ACT inhaler  gabapentin (NEURONTIN) 300 MG capsule  gemfibrozil (LOPID) 600 MG tablet  ipratropium - albuterol 0.5 mg/2.5 mg/3 mL (DUONEB) 0.5-2.5 (3) MG/3ML neb solution  lisinopril (ZESTRIL) 20 MG tablet  metoprolol succinate ER (TOPROL XL) 50 MG 24 hr tablet  montelukast (SINGULAIR) 10 MG tablet  multivitamin w/minerals (THERA-VIT-M) tablet  Omega-3 Fatty Acids (OMEGA-3 FISH OIL PO)  tirzepatide (MOUNJARO) 2.5 MG/0.5ML pen  [START ON 1/26/2024] tirzepatide (MOUNJARO) 5 MG/0.5ML pen  [START ON 2/23/2024] tirzepatide (MOUNJARO) 7.5 MG/0.5ML pen          Review of Systems  A medically appropriate review of systems was performed with pertinent positives and negatives noted in the HPI, and all other systems negative.    Physical Exam   Patient Vitals for the past 24 hrs:   BP Temp Temp src Pulse Resp SpO2 Height Weight   01/13/24 2040 (!) 133/90 98.5  F (36.9  C) Oral 90 22 90 % 1.905 m (6' 3\") 146.4 kg (322 lb 12.1 oz)          Physical Exam  General: alert and in acute respiratory distress on arrival  Head: atraumatic, normocephalic  Lungs: Increased work of breathing, with audible wheezing, and diffuse and expiratory wheezing throughout both lungs.  CV:  extremities warm and perfused.  No significant edema of bilateral lower extremities  Abd: nondistended  Skin: no rashes, no diaphoresis and skin color normal  Neuro: Patient awake, alert, speech is fluent,   Psychiatric: affect/mood normal,        ED Course                 Procedures                           Results for orders placed or performed during the hospital encounter of 01/13/24 (from the past 24 hour(s)) "   Symptomatic Influenza A/B, RSV, & SARS-CoV2 PCR (COVID-19) Nose    Specimen: Nose; Swab   Result Value Ref Range    Influenza A PCR Negative Negative    Influenza B PCR Negative Negative    RSV PCR Negative Negative    SARS CoV2 PCR Negative Negative    Narrative    Testing was performed using the Xpert Xpress CoV2/Flu/RSV Assay on the [employer] GeneXpert Instrument. This test should be ordered for the detection of SARS-CoV-2, influenza, and RSV viruses in individuals who meet clinical and/or epidemiological criteria. Test performance is unknown in asymptomatic patients. This test is for in vitro diagnostic use under the FDA EUA for laboratories certified under CLIA to perform high or moderate complexity testing. This test has not been FDA cleared or approved. A negative result does not rule out the presence of PCR inhibitors in the specimen or target RNA in concentration below the limit of detection for the assay. If only one viral target is positive but coinfection with multiple targets is suspected, the sample should be re-tested with another FDA cleared, approved, or authorized test, if coinfection would change clinical management. This test was validated by the [facility]. These laboratories are certified under the Clinical Laboratory Improvement Amendments of 1988 (CLIA-88) as qualified to perform high complexity laboratory testing.   CBC with platelets differential    Narrative    The following orders were created for panel order CBC with platelets differential.  Procedure                               Abnormality         Status                     ---------                               -----------         ------                     CBC with platelets and d...[[id number]]                      Final result                 Please view results for these tests on the individual orders.   Basic metabolic panel   Result Value Ref Range    Sodium 137 135 - 145 mmol/L    Potassium 4.2 3.4 - 5.3  mmol/L    Chloride 101 98 - 107 mmol/L    Carbon Dioxide (CO2) 24 22 - 29 mmol/L    Anion Gap 12 7 - 15 mmol/L    Urea Nitrogen 9.6 6.0 - 20.0 mg/dL    Creatinine 0.89 0.67 - 1.17 mg/dL    GFR Estimate >90 >60 mL/min/1.73m2    Calcium 9.3 8.6 - 10.0 mg/dL    Glucose 104 (H) 70 - 99 mg/dL   Nt probnp inpatient (BNP)   Result Value Ref Range    N terminal Pro BNP Inpatient 99 0 - 450 pg/mL   [place] Draw    Narrative    The following orders were created for panel order [place] Draw.  Procedure                               Abnormality         Status                     ---------                               -----------         ------                     Extra Blood Culture Bottle[[id number]]                       Final result               Extra Blue Top Tube[[id number]]                              Final result                 Please view results for these tests on the individual orders.   CBC with platelets and differential   Result Value Ref Range    WBC Count 9.1 4.0 - 11.0 10e3/uL    RBC Count 5.48 4.40 - 5.90 10e6/uL    Hemoglobin 16.6 13.3 - 17.7 g/dL    Hematocrit 48.8 40.0 - 53.0 %    MCV 89 78 - 100 fL    MCH 30.3 26.5 - 33.0 pg    MCHC 34.0 31.5 - 36.5 g/dL    RDW 13.1 10.0 - 15.0 %    Platelet Count 266 150 - 450 10e3/uL    % Neutrophils 46 %    % Lymphocytes 38 %    % Monocytes 9 %    % Eosinophils 6 %    % Basophils 1 %    % Immature Granulocytes 0 %    NRBCs per 100 WBC 0 <1 /100    Absolute Neutrophils 4.1 1.6 - 8.3 10e3/uL    Absolute Lymphocytes 3.5 0.8 - 5.3 10e3/uL    Absolute Monocytes 0.8 0.0 - 1.3 10e3/uL    Absolute Eosinophils 0.5 0.0 - 0.7 10e3/uL    Absolute Basophils 0.1 0.0 - 0.2 10e3/uL    Absolute Immature Granulocytes 0.0 <=0.4 10e3/uL    Absolute NRBCs 0.0 10e3/uL   Extra Blood Culture Bottle   Result Value Ref Range    Hold Specimen JIC    Extra Blue Top Tube   Result Value Ref Range    Hold Specimen [facility]    Chest CT w/o contrast    Narrative    EXAM: CT CHEST W/O CONTRAST  LOCATION: [facility]  DATE: 1/13/2024    INDICATION: Shortness of breath, wheezing, and continued cough despite antibiotics.  COMPARISON: 10/02/2017  TECHNIQUE: CT chest without IV contrast. Multiplanar reformats were obtained. Dose reduction techniques were used.  CONTRAST: None.    FINDINGS:   LUNGS AND PLEURA: Mild diffuse bronchial wall thickening, most pronounced in the right lower lobe. Scattered bronchial mucous plugging, most pronounced in the bilateral lower lobes and lingula. Mild bronchiectasis in the right middle lobe. Linear   atelectasis or scarring in the mid to lower lungs bilaterally. No focal airspace consolidation. No pleural effusion or pneumothorax.    MEDIASTINUM/AXILLAE: Mildly enlarged subcarinal lymph node measuring 1.1 cm in short axis. No additional lymphadenopathy. No pericardial effusion.    CORONARY ARTERY CALCIFICATION: None.    UPPER ABDOMEN: Unremarkable.    MUSCULOSKELETAL: Multilevel degenerative changes of the spine. Chronic deformities of the bilateral fifth and sixth ribs resulting in chest wall deformity.      Impression    IMPRESSION:   1.  Mild bronchitis with scattered mucous plugging. Mild bronchiectasis in the right middle lobe, suggesting chronic infectious or inflammatory etiology.    2.  No focal airspace consolidation.    3.  Single mildly enlarged subcarinal lymph node, nonspecific and may be reactive.         MEDICATIONS GIVEN IN THE EMERGENCY DEPARTMENT:  Medications   ipratropium - albuterol 0.5 mg/2.5 mg/3 mL (DUONEB) neb solution 3 mL (has no administration in time range)   ipratropium - albuterol 0.5 mg/2.5 mg/3 mL (DUONEB) neb solution 3 mL (3 mLs Nebulization $Given 1/13/24 2106)   methylPREDNISolone sodium succinate (solu-MEDROL) injection 125 mg (125 mg Intravenous $Given 1/13/24 2127)   acetaminophen (TYLENOL) tablet 1,000 mg (1,000 mg Oral $Given 1/13/24 2134)           Independent Interpretation (X-rays, CTs, rhythm strip):  CT scan with  no lobar infiltrate.    Consultations/Discussion of Management or Tests:  None       Social Determinants of Health affecting care:         Assessments & Plan (with Medical Decision Making)  48 year old male who presents to the Emergency Department for evaluation of shortness of breath, and additional wheezing.  Recent antibiotic treatment with azithromycin and Augmentin over the past 3 days.  Despite oral antibiotics, patient has had worsening shortness of breath, and wheezing, with cough productive of yellowish sputum.  Given his worsening status, decision made for broadened workup.  I reviewed prior x-ray imaging from January 10, and there were also concerns regarding potential fluid overload component, and therefore BNP is ordered today, and I will obtain CT scan of the chest.    Patient given duo nebulizer currently for wheezing, in addition to Solu-Medrol.    CT scan performed showing mucous plugging.  Some slight inflammation and inflammatory changes.    Patient given duo nebulizer and Solu-Medrol.  Did feel slightly improved.    Patient will be discharged home with prednisone taper.  He is currently on antibiotics, and will continue antibiotics.  Pulmonology referral was placed.  Patient encouraged to return or be seen if new or worsening symptoms develop.       I have reviewed the nursing notes.    I have reviewed the findings, diagnosis, plan and need for follow up with the patient.       Critical Care time:  none      NEW PRESCRIPTIONS STARTED AT TODAY'S ER VISIT  New Prescriptions    PREDNISONE (DELTASONE) 10 MG TABLET    Take 4 tablets daily for 5 days,  take 2 tablets daily for 3 days, take 1 tablet daily for 3 days, take half a tablet for 3 days.       Final diagnoses:   SOB (shortness of breath)   Wheezing   Acute cough   Restrictive lung disease   Severe persistent asthma with exacerbation (H28)   Moderate persistent asthma, unspecified whether complicated       1/13/2024   [facility]  EMERGENCY DEPT       Henry Clark MD  01/13/24 [id number]

## 2024-01-17 ENCOUNTER — TELEPHONE (OUTPATIENT)
Dept: PULMONOLOGY | Facility: CLINIC | Age: 49
End: 2024-01-17
Payer: COMMERCIAL

## 2024-01-17 DIAGNOSIS — R05.9 COUGH: ICD-10-CM

## 2024-01-17 DIAGNOSIS — J98.4 RESTRICTIVE LUNG DISEASE: ICD-10-CM

## 2024-01-17 DIAGNOSIS — R06.02 SOB (SHORTNESS OF BREATH): Primary | ICD-10-CM

## 2024-01-17 DIAGNOSIS — J45.909 ASTHMA: ICD-10-CM

## 2024-01-17 NOTE — TELEPHONE ENCOUNTER
Spoke with Kamran. Advised him he will need to move his appointments out until he is finished with the prednisone. Gave him the scheduling number to change those appointments.

## 2024-01-27 DIAGNOSIS — I10 BENIGN ESSENTIAL HYPERTENSION: ICD-10-CM

## 2024-01-29 RX ORDER — METOPROLOL SUCCINATE 50 MG/1
TABLET, EXTENDED RELEASE ORAL
Qty: 90 TABLET | Refills: 0 | Status: SHIPPED | OUTPATIENT
Start: 2024-01-29 | End: 2024-03-26

## 2024-02-02 ASSESSMENT — ASTHMA QUESTIONNAIRES
EMERGENCY_ROOM_LAST_YEAR_TOTAL: THREE OR MORE
QUESTION_2 LAST FOUR WEEKS HOW OFTEN HAVE YOU HAD SHORTNESS OF BREATH: THREE TO SIX TIMES A WEEK
QUESTION_3 LAST FOUR WEEKS HOW OFTEN DID YOUR ASTHMA SYMPTOMS (WHEEZING, COUGHING, SHORTNESS OF BREATH, CHEST TIGHTNESS OR PAIN) WAKE YOU UP AT NIGHT OR EARLIER THAN USUAL IN THE MORNING: ONCE OR TWICE
QUESTION_5 LAST FOUR WEEKS HOW WOULD YOU RATE YOUR ASTHMA CONTROL: POORLY CONTROLLED
ACT_TOTALSCORE: 15
QUESTION_1 LAST FOUR WEEKS HOW MUCH OF THE TIME DID YOUR ASTHMA KEEP YOU FROM GETTING AS MUCH DONE AT WORK, SCHOOL OR AT HOME: A LITTLE OF THE TIME
QUESTION_4 LAST FOUR WEEKS HOW OFTEN HAVE YOU USED YOUR RESCUE INHALER OR NEBULIZER MEDICATION (SUCH AS ALBUTEROL): ONE OR TWO TIMES PER DAY
ACT_TOTALSCORE: 15

## 2024-02-03 DIAGNOSIS — J45.51 SEVERE PERSISTENT ASTHMA WITH EXACERBATION (H): ICD-10-CM

## 2024-02-03 DIAGNOSIS — J18.9 ATYPICAL PNEUMONIA: ICD-10-CM

## 2024-02-05 RX ORDER — IPRATROPIUM BROMIDE AND ALBUTEROL SULFATE 2.5; .5 MG/3ML; MG/3ML
SOLUTION RESPIRATORY (INHALATION)
Qty: 90 ML | Refills: 0 | Status: SHIPPED | OUTPATIENT
Start: 2024-02-05 | End: 2024-03-26

## 2024-02-09 ENCOUNTER — OFFICE VISIT (OUTPATIENT)
Dept: PULMONOLOGY | Facility: CLINIC | Age: 49
End: 2024-02-09
Payer: COMMERCIAL

## 2024-02-09 VITALS
WEIGHT: 315 LBS | HEART RATE: 82 BPM | SYSTOLIC BLOOD PRESSURE: 132 MMHG | DIASTOLIC BLOOD PRESSURE: 88 MMHG | BODY MASS INDEX: 39.17 KG/M2 | OXYGEN SATURATION: 94 % | HEIGHT: 75 IN

## 2024-02-09 DIAGNOSIS — R05.9 COUGH: ICD-10-CM

## 2024-02-09 DIAGNOSIS — R06.02 SOB (SHORTNESS OF BREATH): ICD-10-CM

## 2024-02-09 DIAGNOSIS — R05.1 ACUTE COUGH: ICD-10-CM

## 2024-02-09 DIAGNOSIS — J98.4 RESTRICTIVE LUNG DISEASE: ICD-10-CM

## 2024-02-09 DIAGNOSIS — J45.909 ASTHMA: ICD-10-CM

## 2024-02-09 DIAGNOSIS — J45.51 SEVERE PERSISTENT ASTHMA WITH EXACERBATION (H): ICD-10-CM

## 2024-02-09 LAB — HGB BLD-MCNC: 16.4 G/DL

## 2024-02-09 PROCEDURE — 95012 NITRIC OXIDE EXP GAS DETER: CPT | Performed by: INTERNAL MEDICINE

## 2024-02-09 PROCEDURE — 99204 OFFICE O/P NEW MOD 45 MIN: CPT | Mod: 25 | Performed by: INTERNAL MEDICINE

## 2024-02-09 PROCEDURE — 94060 EVALUATION OF WHEEZING: CPT | Performed by: INTERNAL MEDICINE

## 2024-02-09 PROCEDURE — 94729 DIFFUSING CAPACITY: CPT | Performed by: INTERNAL MEDICINE

## 2024-02-09 PROCEDURE — 94726 PLETHYSMOGRAPHY LUNG VOLUMES: CPT | Performed by: INTERNAL MEDICINE

## 2024-02-09 PROCEDURE — 85018 HEMOGLOBIN: CPT | Mod: QW | Performed by: INTERNAL MEDICINE

## 2024-02-09 RX ORDER — OMEPRAZOLE 40 MG/1
40 CAPSULE, DELAYED RELEASE ORAL DAILY
Qty: 30 CAPSULE | Refills: 11 | Status: SHIPPED | OUTPATIENT
Start: 2024-02-09 | End: 2024-03-10

## 2024-02-09 RX ORDER — PREDNISONE 20 MG/1
TABLET ORAL
Qty: 20 TABLET | Refills: 0 | Status: SHIPPED | OUTPATIENT
Start: 2024-02-09 | End: 2024-03-26

## 2024-02-09 RX ORDER — FLUTICASONE FUROATE AND VILANTEROL 200; 25 UG/1; UG/1
1 POWDER RESPIRATORY (INHALATION) DAILY
Qty: 1 EACH | Refills: 11 | Status: SHIPPED | OUTPATIENT
Start: 2024-02-09

## 2024-02-09 NOTE — PATIENT INSTRUCTIONS
Please use your  Breo inhaler once a day every day whether or not you are short of breath, this is not a rescue inhaler so do not use this if you are acutely short of breath.  Please be sure to gargle your mouth after using your Breo inhaler.  Please use your albuterol inhaler 2 puffs up to 6 times a day for rescue. If you are not short of breath, you do not need to use this.  Please Discontinue your QVAR inhaler.   Please do not lie down for at least 2 hours after your meals.  Please elevate the head end of your bed when you sleep, you can do this by placing a thick book under the head post.

## 2024-02-09 NOTE — PROGRESS NOTES
CCx:ER followup for SOB    HPI:Mr. Conn is a 48 year old gentleman with a past medical history significant for obesity, HTN, Asthma, gout and hypertriglyceridemia amongst other medical issues who presents to clinic today for the aforementioned chief complaint. He has been struggling with SOB associated with wheezing since January when he had developed a URI and was seen in [facility].  He was prescribed antibiotics and bronchodilators but continued to have worsening shortness of breath with exertion and presented to the emergency department.  He underwent chest imaging which raised concerns for infectious etiology and was discharged on prednisone taper (14 days).  Tell me that his breathing was better while he was on prednisone but now he has a dry cough that is associated with wheezing.  He is prescribed Qvar which he is in using (he thinks) twice a day and uses albuterol nebulizer at least once a day.  Typically eats dinner at 530 and mostly between 9 and 10 PM.  He occasionally has ice cream and an afternoon snack.  He sleeps on 1 pillow and denies PND orthopnea.  He has noticed that his coughing becomes much worse when he is around cigarette smoke.      ROS:  Pertinent positives alluded to in the HPI. Remainder of 10 point ROS is negative.     PMH:  Obesity  HTN  Asthma  Gout  Hypertriglyceridemia    PSH:  Appendectomy  Right inguinal hernia repair  Sternal repair  Tonsillectomy    Allergies:  Reviewed.     Family HX:  Mother: HTN, DM, Cataracts  Father: Gastric cancer, HTN, hypercholesterolemia  Sister:Thyroid disease, RA, Anxiety    Social Hx:  Marital status:   Number of children: 1 daughter  Resides in a house built in 1999, no concern for mold.  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  Pets: 2 dogs, 1 cat  Smoking history: 0 pack year  history  Alcohol use: Sparingly   Recreational drug use: No  Hobbies: Shoot guns, fishing  Recent Travel: No    Current Meds:  Current Outpatient Medications   Medication Sig Dispense Refill    albuterol (PROAIR HFA/PROVENTIL HFA/VENTOLIN HFA) 108 (90 Base) MCG/ACT inhaler Inhale 2 puffs into the lungs every 6 hours as needed for shortness of breath / dyspnea or wheezing 18 g 3    allopurinol (ZYLOPRIM) 300 MG tablet Take 1 tablet (300 mg) by mouth daily 90 tablet 3    beclomethasone HFA (QVAR REDIHALER) 40 MCG/ACT inhaler Inhale 1 puff into the lungs 2 times daily 10.6 g 3    gabapentin (NEURONTIN) 300 MG capsule Take 1 capsule (300 mg) by mouth At Bedtime 90 capsule 3    gemfibrozil (LOPID) 600 MG tablet Take 1 tablet (600 mg) by mouth 2 times daily (before meals) 180 tablet 3    ipratropium - albuterol 0.5 mg/2.5 mg/3 mL (DUONEB) 0.5-2.5 (3) MG/3ML neb solution INHALE 1 VIAL (3 MLS) BY NEBULIZATION EVERY 6 HOURS AS NEEDED FOR SHORTNESS OF BREATH, WHEEZING OR COUGH 90 mL 0    lisinopril (ZESTRIL) 20 MG tablet Take 1 tablet (20 mg) by mouth daily 90 tablet 3    metoprolol succinate ER (TOPROL XL) 50 MG 24 hr tablet TAKE ONE TABLET BY MOUTH ONCE DAILY 90 tablet 0    montelukast (SINGULAIR) 10 MG tablet Take 1 tablet (10 mg) by mouth At Bedtime 90 tablet 3    multivitamin w/minerals (THERA-VIT-M) tablet Take 1 tablet by mouth 2 times daily.      Omega-3 Fatty Acids (OMEGA-3 FISH OIL PO)       albuterol (PROAIR HFA/PROVENTIL HFA/VENTOLIN HFA) 108 (90 Base) MCG/ACT inhaler Inhale 2 puffs into the lungs every 4 hours as needed for shortness of breath, wheezing or cough 18 g 3    tirzepatide (MOUNJARO) 5 MG/0.5ML pen Inject 5 mg Subcutaneous every 7 days for 28 days (Patient not taking: Reported on 1/10/2024) 2 mL 0    [START ON 2/23/2024] tirzepatide (MOUNJARO) 7.5 MG/0.5ML pen Inject 7.5 mg Subcutaneous every 7 days (Patient not taking: Reported on 1/10/2024) 6 mL 3       Labs:  Reviewed.    Imaging studies:  CT Chest  w/o Contrast 1/13/24:  1.  Mild bronchitis with scattered mucous plugging. Mild bronchiectasis in the right middle lobe, suggesting chronic infectious or inflammatory etiology.  2.  No focal airspace consolidation.  3.  Single mildly enlarged subcarinal lymph node, nonspecific and may be reactive.    PFT's 2/9/24:  FEV1/FVC is 66% and is reduced.  FEV1 is 1.42L (34%) predicted and is reduced.  FVC is 2.15L (40%) predicted and reduced.  There  is not improvement in spirometry after a single inhaled dose of bronchodilator.  TLC is 5.82L (73%) predicted and is reduced.  RV is 3.45L (148%) predicted and is increased.  DLCO is 26.63ml/min/hg (80%) predicted and is normal when it is corrected for hemoglobin.  Flow volume loops indicate scooping of the expiratory limb.    Impression:  Full Pulmonary Function Test is abnormal.  PFTs are consistent with very severe obstructive disease.  Spirometry is not consistent with reversibility.  There  is not  hyperinflation.  There  is  air-trapping.  Diffusion capacity when corrected for hemoglobin is  normal.    The ATS criteria for acceptability and reproducibility was met.    PFT's 1/18/18:  FEV1/FVC is 72% and is normal.  FEV1 is 2.19L (47%) predicted and is reduced.  FVC is 3.03L (51%) predicted and reduced.  Flow volume loops indicate scooping of the expiratory limb.    Impression:  Full Pulmonary Function Test is abnormal.  PFTs are consistent with severe obstructive disease.    PFT's 8/17/17:  FEV1/FVC is 78% and is normal.  FEV1 is 2.54L (54%) predicted and is reduced.  FVC is 3.26L (55%) predicted and reduced.  There  was no improvement in spirometry after a single inhaled dose of bronchodilator.  TLC is 6.05L (76%) predicted and is reduced.  RV is 2.75L (126%) predicted and is increased.  DLCO is 33.98ml/min/hg (84%) predicted and is normal when it is corrected for hemoglobin.  Flow volume loops indicate mild scooping of the expiratory limb.    Impression:  Full Pulmonary  "Function Test is abnormal.  PFTs are consistent with moderate-severe obstructive disease.  Spirometry is not consistent with reversibility.  There  is no  hyperinflation.  There  is  air-trapping.  Diffusion capacity when corrected for hemoglobin is normal.    The ATS criteria for acceptability and reproducibility was met.    Physical Exam:  /88 (BP Location: Right arm, Patient Position: Sitting, Cuff Size: Adult Regular)   Pulse 82   Ht 1.905 m (6' 3\")   Wt 147.4 kg (325 lb)   SpO2 94%   BMI 40.62 kg/m    NIOX 25ppm  Physical Exam  Constitutional:       Appearance: Normal appearance. He is obese.   HENT:      Head: Normocephalic.      Mouth/Throat:      Mouth: Mucous membranes are moist.   Cardiovascular:      Rate and Rhythm: Normal rate and regular rhythm.      Heart sounds: Normal heart sounds.   Pulmonary:      Effort: Pulmonary effort is normal.      Breath sounds: Wheezing present.   Abdominal:      General: Abdomen is flat.   Neurological:      General: No focal deficit present.      Mental Status: He is alert and oriented to person, place, and time.           Assessment and Plan:Makr Conn is a 48 year old with a past medical history significant for obesity, HTN, Asthma, gout and hypertriglyceridemia who presents to clinic today for establishment of care for reactive airways disease is presently still having exacerbation.  His pulmonary function testing demonstrates air trapping chest imaging demonstrates mucous plugging with some bronchiectasis. For the present we would recommend;    Moderate persistent asthma: Based on symptoms and physical findings present.  He continues to have wheezing and is still short of breath.   Discontinue Qvar and switch to Breo Ellipta 2 puffs daily.  He is instructed to rinse his mouth after using this.  As needed albuterol for rescue.  I will initiate a prednisone taper over 10 days given his ongoing wheezing and shortness of breath.  I prescribed him Prilosec " for non-MRSA told him to initiate this therapy.  Continue Singulair.  Instructed to elevate the head of bed when he is lying down and not to lie down for at least 2 hours after eating.  I have asked him to contact me if he continues to have nighttime symptoms, wheezing or any other such issues as I will have a low threshold to initiate Incuse Ellipta.  Bronchiectasis: Noted on imaging and likely does not is taking a while to clear his asthma exacerbation.  No clear need for mucolytic agent presently.  Follow-up: 6 weeks.      Isabel Modiqvi  Pulmonary and Critical Care  [id number]

## 2024-02-13 LAB
DLCOCOR-%PRED-PRE: 80 %
DLCOCOR-PRE: 26.63 ML/MIN/MMHG
DLCOUNC-%PRED-PRE: 84 %
DLCOUNC-PRE: 27.89 ML/MIN/MMHG
DLCOUNC-PRED: 32.93 ML/MIN/MMHG
ERV-%PRED-PRE: 15 %
ERV-PRE: 0.29 L
ERV-PRED: 1.9 L
EXPTIME-PRE: 6.1 SEC
FEF2575-%PRED-POST: 25 %
FEF2575-%PRED-PRE: 22 %
FEF2575-POST: 0.95 L/SEC
FEF2575-PRE: 0.83 L/SEC
FEF2575-PRED: 3.75 L/SEC
FEFMAX-%PRED-PRE: 29 %
FEFMAX-PRE: 3.14 L/SEC
FEFMAX-PRED: 10.78 L/SEC
FEV1-%PRED-PRE: 34 %
FEV1-PRE: 1.42 L
FEV1FEV6-PRE: 67 %
FEV1FEV6-PRED: 80 %
FEV1FVC-PRE: 66 %
FEV1FVC-PRED: 79 %
FEV1SVC-PRE: 60 %
FEV1SVC-PRED: 80 %
FIFMAX-PRE: 4.71 L/SEC
FRCPLETH-%PRED-PRE: 99 %
FRCPLETH-PRE: 3.74 L
FRCPLETH-PRED: 3.75 L
FVC-%PRED-PRE: 40 %
FVC-PRE: 2.15 L
FVC-PRED: 5.27 L
IC-%PRED-PRE: 54 %
IC-PRE: 2.07 L
IC-PRED: 3.8 L
RVPLETH-%PRED-PRE: 148 %
RVPLETH-PRE: 3.45 L
RVPLETH-PRED: 2.32 L
TLCPLETH-%PRED-PRE: 73 %
TLCPLETH-PRE: 5.82 L
TLCPLETH-PRED: 7.94 L
VA-%PRED-PRE: 56 %
VA-PRE: 4.3 L
VC-%PRED-PRE: 45 %
VC-PRE: 2.37 L
VC-PRED: 5.2 L

## 2024-02-20 ENCOUNTER — MYC MEDICAL ADVICE (OUTPATIENT)
Dept: PULMONOLOGY | Facility: CLINIC | Age: 49
End: 2024-02-20
Payer: COMMERCIAL

## 2024-02-20 DIAGNOSIS — B37.0 THRUSH: Primary | ICD-10-CM

## 2024-02-21 RX ORDER — NYSTATIN 100000/ML
500000 SUSPENSION, ORAL (FINAL DOSE FORM) ORAL 4 TIMES DAILY
Qty: 200 ML | Refills: 0 | Status: SHIPPED | OUTPATIENT
Start: 2024-02-21 | End: 2024-03-02

## 2024-03-09 DIAGNOSIS — I10 BENIGN ESSENTIAL HYPERTENSION: ICD-10-CM

## 2024-03-11 RX ORDER — LISINOPRIL 20 MG/1
20 TABLET ORAL DAILY
Qty: 90 TABLET | Refills: 2 | Status: SHIPPED | OUTPATIENT
Start: 2024-03-11

## 2024-03-20 ENCOUNTER — TRANSFERRED RECORDS (OUTPATIENT)
Dept: HEALTH INFORMATION MANAGEMENT | Facility: CLINIC | Age: 49
End: 2024-03-20
Payer: COMMERCIAL

## 2024-03-23 DIAGNOSIS — M1A.9XX0 CHRONIC GOUT WITHOUT TOPHUS, UNSPECIFIED CAUSE, UNSPECIFIED SITE: ICD-10-CM

## 2024-03-25 RX ORDER — ALLOPURINOL 300 MG/1
1 TABLET ORAL DAILY
Qty: 90 TABLET | Refills: 3 | OUTPATIENT
Start: 2024-03-25

## 2024-03-26 ENCOUNTER — VIRTUAL VISIT (OUTPATIENT)
Dept: FAMILY MEDICINE | Facility: CLINIC | Age: 49
End: 2024-03-26
Payer: COMMERCIAL

## 2024-03-26 DIAGNOSIS — E66.01 CLASS 3 SEVERE OBESITY DUE TO EXCESS CALORIES WITH SERIOUS COMORBIDITY AND BODY MASS INDEX (BMI) OF 40.0 TO 44.9 IN ADULT (H): ICD-10-CM

## 2024-03-26 DIAGNOSIS — J45.20 MILD INTERMITTENT ASTHMA WITHOUT COMPLICATION: ICD-10-CM

## 2024-03-26 DIAGNOSIS — M1A.9XX0 CHRONIC GOUT WITHOUT TOPHUS, UNSPECIFIED CAUSE, UNSPECIFIED SITE: ICD-10-CM

## 2024-03-26 DIAGNOSIS — Z12.11 SCREEN FOR COLON CANCER: ICD-10-CM

## 2024-03-26 DIAGNOSIS — G62.9 NEUROPATHY: ICD-10-CM

## 2024-03-26 DIAGNOSIS — E78.1 HYPERTRIGLYCERIDEMIA: ICD-10-CM

## 2024-03-26 DIAGNOSIS — I47.10 SVT (SUPRAVENTRICULAR TACHYCARDIA) (H): ICD-10-CM

## 2024-03-26 DIAGNOSIS — I10 BENIGN ESSENTIAL HYPERTENSION: ICD-10-CM

## 2024-03-26 DIAGNOSIS — E66.813 CLASS 3 SEVERE OBESITY DUE TO EXCESS CALORIES WITH SERIOUS COMORBIDITY AND BODY MASS INDEX (BMI) OF 40.0 TO 44.9 IN ADULT (H): ICD-10-CM

## 2024-03-26 DIAGNOSIS — J45.40 MODERATE PERSISTENT ASTHMA, UNSPECIFIED WHETHER COMPLICATED: ICD-10-CM

## 2024-03-26 PROCEDURE — 99214 OFFICE O/P EST MOD 30 MIN: CPT | Mod: 95 | Performed by: NURSE PRACTITIONER

## 2024-03-26 PROCEDURE — G2211 COMPLEX E/M VISIT ADD ON: HCPCS | Mod: 95 | Performed by: NURSE PRACTITIONER

## 2024-03-26 RX ORDER — GABAPENTIN 300 MG/1
300 CAPSULE ORAL AT BEDTIME
Qty: 90 CAPSULE | Refills: 3 | Status: SHIPPED | OUTPATIENT
Start: 2024-03-26

## 2024-03-26 RX ORDER — ALLOPURINOL 300 MG/1
1 TABLET ORAL DAILY
Qty: 90 TABLET | Refills: 3 | Status: SHIPPED | OUTPATIENT
Start: 2024-03-26

## 2024-03-26 RX ORDER — METOPROLOL SUCCINATE 50 MG/1
50 TABLET, EXTENDED RELEASE ORAL DAILY
Qty: 90 TABLET | Refills: 0 | Status: SHIPPED | OUTPATIENT
Start: 2024-03-26 | End: 2024-07-29

## 2024-03-26 RX ORDER — GEMFIBROZIL 600 MG/1
600 TABLET, FILM COATED ORAL
Qty: 180 TABLET | Refills: 3 | Status: SHIPPED | OUTPATIENT
Start: 2024-03-26

## 2024-03-26 RX ORDER — ALBUTEROL SULFATE 90 UG/1
2 AEROSOL, METERED RESPIRATORY (INHALATION) EVERY 6 HOURS PRN
Qty: 18 G | Refills: 3 | Status: SHIPPED | OUTPATIENT
Start: 2024-03-26

## 2024-03-26 RX ORDER — MONTELUKAST SODIUM 10 MG/1
1 TABLET ORAL AT BEDTIME
Qty: 90 TABLET | Refills: 3 | Status: SHIPPED | OUTPATIENT
Start: 2024-03-26

## 2024-03-26 NOTE — PROGRESS NOTES
Kamran is a 48 year old who is being evaluated via a billable video visit.    How would you like to obtain your AVS? MyChart  If the video visit is dropped, the invitation should be resent by: Text to cell phone: [phone]  Will anyone else be joining your video visit? No      Assessment & Plan     Mild intermittent asthma without complication  Stable is followed by pulmonology  - albuterol (PROAIR HFA/PROVENTIL HFA/VENTOLIN HFA) 108 (90 Base) MCG/ACT inhaler; Inhale 2 puffs into the lungs every 6 hours as needed for shortness of breath or wheezing    Chronic gout without tophus, unspecified cause, unspecified site   Controlled no change in treatment plan   - allopurinol (ZYLOPRIM) 300 MG tablet; Take 1 tablet (300 mg) by mouth daily    SVT (supraventricular tachycardia)  Stable     Neuropathy  stable  - gabapentin (NEURONTIN) 300 MG capsule; Take 1 capsule (300 mg) by mouth at bedtime    Hypertriglyceridemia   Controlled no change in treatment plan   - gemfibrozil (LOPID) 600 MG tablet; Take 1 tablet (600 mg) by mouth 2 times daily (before meals)  - Lipid panel reflex to direct LDL Fasting; Future    Benign essential hypertension   Controlled no change in treatment plan   - metoprolol succinate ER (TOPROL XL) 50 MG 24 hr tablet; Take 1 tablet (50 mg) by mouth daily    Moderate persistent asthma, unspecified whether complicated  Stable is followed by pulmonology  - montelukast (SINGULAIR) 10 MG tablet; Take 1 tablet (10 mg) by mouth at bedtime    Class 3 severe obesity due to excess calories with serious comorbidity and body mass index (BMI) of 40.0 to 44.9 in adult (H)  Reviewed diet and exercise    Screen for colon cancer  - Colonoscopy Screening  Referral; Future    The longitudinal plan of care for the diagnosis(es)/condition(s) as documented were addressed during this visit. Due to the added complexity in care, I will continue to support Kamran in the subsequent management and with ongoing continuity of  care.      See Patient Instructions    Subjective   Kamran is a 48 year old, presenting for the following health issues:  No chief complaint on file.      3/26/2024     2:11 PM   Additional Questions   Roomed by Domenica CANNON     History of Present Illness       Reason for visit:  Med check.    He eats 2-3 servings of fruits and vegetables daily.He consumes 3 sweetened beverage(s) daily.He exercises with enough effort to increase his heart rate 30 to 60 minutes per day.  He exercises with enough effort to increase his heart rate 3 or less days per week.   He is taking medications regularly.       Hypertension Follow-up    Do you check your blood pressure regularly outside of the clinic? Yes   Are you following a low salt diet? Yes  Are your blood pressures ever more than 140 on the top number (systolic) OR more   than 90 on the bottom number (diastolic), for example 140/90? No    Asthma Follow-Up    Was ACT completed today?  No    Do you have a cough?  No  Are you experiencing any wheezing in your chest?  No  Do you have any shortness of breath?  No   How often are you using a short-acting (rescue) inhaler or nebulizer, such as Albuterol?  A few times a month  How many days per week do you miss taking your asthma controller medication?  I do not have an asthma controller medication  Please describe any recent triggers for your asthma: Patient is unaware of triggers  Have you had any Emergency Room Visits, Urgent Care Visits, or Hospital Admissions since your last office visit?  Yes  Number of ER or Urgent Care visits for asthma: 1    Medication Followup of allopurinol  Taking Medication as prescribed: yes  Side Effects:  None  Medication Helping Symptoms:  yes          Review of Systems  Constitutional, HEENT, cardiovascular, pulmonary, gi and gu systems are negative, except as otherwise noted.      Objective           Vitals:  No vitals were obtained today due to virtual visit.    Physical Exam   GENERAL: alert and no  distress  EYES: Eyes grossly normal to inspection.  No discharge or erythema, or obvious scleral/conjunctival abnormalities.  RESP: No audible wheeze, cough, or visible cyanosis.    SKIN: Visible skin clear. No significant rash, abnormal pigmentation or lesions.  NEURO: Cranial nerves grossly intact.  Mentation and speech appropriate for age.  PSYCH: Appropriate affect, tone, and pace of words    No results found for any visits on 03/26/24.      Video-Visit Details    Type of service:  Video Visit   Originating Location (pt. Location): Home    Distant Location (provider location):  On-site  Platform used for Video Visit: Lobo  Signed Electronically by: BLAISE Quinn CNP

## 2024-03-29 ASSESSMENT — ASTHMA QUESTIONNAIRES
EMERGENCY_ROOM_LAST_YEAR_TOTAL: ONE
QUESTION_2 LAST FOUR WEEKS HOW OFTEN HAVE YOU HAD SHORTNESS OF BREATH: ONCE OR TWICE A WEEK
QUESTION_1 LAST FOUR WEEKS HOW MUCH OF THE TIME DID YOUR ASTHMA KEEP YOU FROM GETTING AS MUCH DONE AT WORK, SCHOOL OR AT HOME: NONE OF THE TIME
QUESTION_3 LAST FOUR WEEKS HOW OFTEN DID YOUR ASTHMA SYMPTOMS (WHEEZING, COUGHING, SHORTNESS OF BREATH, CHEST TIGHTNESS OR PAIN) WAKE YOU UP AT NIGHT OR EARLIER THAN USUAL IN THE MORNING: NOT AT ALL
ACT_TOTALSCORE: 22
QUESTION_4 LAST FOUR WEEKS HOW OFTEN HAVE YOU USED YOUR RESCUE INHALER OR NEBULIZER MEDICATION (SUCH AS ALBUTEROL): ONCE A WEEK OR LESS
QUESTION_5 LAST FOUR WEEKS HOW WOULD YOU RATE YOUR ASTHMA CONTROL: WELL CONTROLLED
ACT_TOTALSCORE: 22

## 2024-04-05 ENCOUNTER — OFFICE VISIT (OUTPATIENT)
Dept: PULMONOLOGY | Facility: CLINIC | Age: 49
End: 2024-04-05
Attending: INTERNAL MEDICINE
Payer: COMMERCIAL

## 2024-04-05 VITALS
WEIGHT: 315 LBS | BODY MASS INDEX: 40.25 KG/M2 | DIASTOLIC BLOOD PRESSURE: 78 MMHG | SYSTOLIC BLOOD PRESSURE: 130 MMHG | OXYGEN SATURATION: 97 % | HEART RATE: 100 BPM

## 2024-04-05 DIAGNOSIS — E66.01 CLASS 3 SEVERE OBESITY WITH SERIOUS COMORBIDITY AND BODY MASS INDEX (BMI) OF 40.0 TO 44.9 IN ADULT, UNSPECIFIED OBESITY TYPE (H): ICD-10-CM

## 2024-04-05 DIAGNOSIS — J45.51 SEVERE PERSISTENT ASTHMA WITH EXACERBATION (H): Primary | ICD-10-CM

## 2024-04-05 DIAGNOSIS — E66.813 CLASS 3 SEVERE OBESITY WITH SERIOUS COMORBIDITY AND BODY MASS INDEX (BMI) OF 40.0 TO 44.9 IN ADULT, UNSPECIFIED OBESITY TYPE (H): ICD-10-CM

## 2024-04-05 PROCEDURE — 99214 OFFICE O/P EST MOD 30 MIN: CPT | Performed by: INTERNAL MEDICINE

## 2024-04-05 NOTE — PROGRESS NOTES
CCx:Follow-up for Asthma    HPI:Mr. Conn is a 48 year old gentleman with a past medical history significant for obesity, HTN, Asthma, gout and hypertriglyceridemia amongst other medical issues who presents to clinic today for the aforementioned chief complaint.  Since his last clinic visit with me, he is doing much better.  He is using Breo Ellipta once a day and only feels the need to use his albuterol inhaler maybe once a week.  He was recently in [place] for spring break and states that his breathing was actually much easier there.  He has noted that his usual triggers for coughing are tobacco and highly scented products.  He has started taking Claritin once a day as he does have seasonal allergies and usually initiates this around this time of the year.  He denies nighttime awakenings and continues to have 2 hours in between meals and lying down.    ROS:  Pertinent positives alluded to in the HPI. Remainder of 10 point ROS is negative.     PMH (Unchanged from previous visit):  Obesity  HTN  Asthma  Gout  Hypertriglyceridemia    PSH (Unchanged from previous visit):  Appendectomy  Right inguinal hernia repair  Sternal repair  Tonsillectomy    Allergies (Unchanged from previous visit):  Reviewed.     Family HX (Unchanged from previous visit):  Mother: HTN, DM, Cataracts  Father: Gastric cancer, HTN, hypercholesterolemia  Sister:Thyroid disease, RA, Anxiety    Social Hx (Unchanged from previous visit):  Marital status:   Number of children: 1 daughter  Resides in a house built in 1999, no concern for mold.  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  Pets: 2 dogs, 1 cat  Smoking history: 0 pack year history  Alcohol use: Sparingly   Recreational drug use: No  Hobbies: Shoot guns, fishing  Recent Travel: No    Current Meds:  Current Outpatient Medications    Medication Sig Dispense Refill    albuterol (PROAIR HFA/PROVENTIL HFA/VENTOLIN HFA) 108 (90 Base) MCG/ACT inhaler Inhale 2 puffs into the lungs every 6 hours as needed for shortness of breath or wheezing 18 g 3    allopurinol (ZYLOPRIM) 300 MG tablet Take 1 tablet (300 mg) by mouth daily 90 tablet 3    fluticasone-vilanterol (BREO ELLIPTA) 200-25 MCG/ACT inhaler Inhale 1 puff into the lungs daily 1 each 11    gabapentin (NEURONTIN) 300 MG capsule Take 1 capsule (300 mg) by mouth at bedtime 90 capsule 3    gemfibrozil (LOPID) 600 MG tablet Take 1 tablet (600 mg) by mouth 2 times daily (before meals) 180 tablet 3    lisinopril (ZESTRIL) 20 MG tablet TAKE 1 TABLET (20 MG) BY MOUTH DAILY 90 tablet 2    metoprolol succinate ER (TOPROL XL) 50 MG 24 hr tablet Take 1 tablet (50 mg) by mouth daily 90 tablet 0    montelukast (SINGULAIR) 10 MG tablet Take 1 tablet (10 mg) by mouth at bedtime 90 tablet 3    multivitamin w/minerals (THERA-VIT-M) tablet Take 1 tablet by mouth 2 times daily.      Omega-3 Fatty Acids (OMEGA-3 FISH OIL PO)          Labs:  Reviewed.    Imaging studies:  CT Chest w/o Contrast 1/13/24:  1.  Mild bronchitis with scattered mucous plugging. Mild bronchiectasis in the right middle lobe, suggesting chronic infectious or inflammatory etiology.  2.  No focal airspace consolidation.  3.  Single mildly enlarged subcarinal lymph node, nonspecific and may be reactive.    PFT's 2/9/24:  FEV1/FVC is 66% and is reduced.  FEV1 is 1.42L (34%) predicted and is reduced.  FVC is 2.15L (40%) predicted and reduced.  There  is not improvement in spirometry after a single inhaled dose of bronchodilator.  TLC is 5.82L (73%) predicted and is reduced.  RV is 3.45L (148%) predicted and is increased.  DLCO is 26.63ml/min/hg (80%) predicted and is normal when it is corrected for hemoglobin.  Flow volume loops indicate scooping of the expiratory limb.    Impression:  Full Pulmonary Function Test is abnormal.  PFTs are consistent  with very severe obstructive disease.  Spirometry is not consistent with reversibility.  There  is not  hyperinflation.  There  is  air-trapping.  Diffusion capacity when corrected for hemoglobin is  normal.    The ATS criteria for acceptability and reproducibility was met.    PFT's 1/18/18:  FEV1/FVC is 72% and is normal.  FEV1 is 2.19L (47%) predicted and is reduced.  FVC is 3.03L (51%) predicted and reduced.  Flow volume loops indicate scooping of the expiratory limb.    Impression:  Full Pulmonary Function Test is abnormal.  PFTs are consistent with severe obstructive disease.    PFT's 8/17/17:  FEV1/FVC is 78% and is normal.  FEV1 is 2.54L (54%) predicted and is reduced.  FVC is 3.26L (55%) predicted and reduced.  There  was no improvement in spirometry after a single inhaled dose of bronchodilator.  TLC is 6.05L (76%) predicted and is reduced.  RV is 2.75L (126%) predicted and is increased.  DLCO is 33.98ml/min/hg (84%) predicted and is normal when it is corrected for hemoglobin.  Flow volume loops indicate mild scooping of the expiratory limb.    Impression:  Full Pulmonary Function Test is abnormal.  PFTs are consistent with moderate-severe obstructive disease.  Spirometry is not consistent with reversibility.  There  is no  hyperinflation.  There  is  air-trapping.  Diffusion capacity when corrected for hemoglobin is normal.    The ATS criteria for acceptability and reproducibility was met.    Physical Exam:  /78 (BP Location: Left arm, Patient Position: Chair, Cuff Size: Adult Large)   Pulse 100   Wt 146.1 kg (322 lb)   SpO2 97%   BMI 40.25 kg/m    NIOX 25ppm  Physical Exam  Constitutional:       Appearance: Normal appearance. He is obese.   HENT:      Head: Normocephalic.      Mouth/Throat:      Mouth: Mucous membranes are moist.   Cardiovascular:      Rate and Rhythm: Normal rate and regular rhythm.      Heart sounds: Normal heart sounds.   Pulmonary:      Effort: Pulmonary effort is normal.    Abdominal:      General: Abdomen is flat.   Neurological:      General: No focal deficit present.      Mental Status: He is alert and oriented to person, place, and time.           Assessment and Plan:Mark Conn is a 48 year old with a past medical history significant for obesity, HTN, Asthma, gout and hypertriglyceridemia who presents to clinic today for establishment of care for reactive airways disease is presently still having exacerbation.  His pulmonary function testing demonstrates air trapping chest imaging demonstrates mucous plugging with some bronchiectasis. For the present we would recommend;    Moderate persistent asthma: Based on symptoms and physical findings present.  Symptoms significantly improved since escalating inhaled bronchodilator regimen.  Had developed thrush and was treated with nystatin for this.  Continue Breo Ellipta 2 puffs daily.  He is reminded to rinse his mouth after using this.  As needed albuterol for rescue.  Continue Prilosec 40 mg a day.  Continue Singulair.  Asthma action plan: Prednisone 40 mg once a day for 5 days.  Bronchiectasis: Noted on imaging and likely does not is taking a while to clear his asthma exacerbation.  No clear need for mucolytic agent presently.  HCM: Counseled him about the importance of losing some weight and healthy eating.  We discussed various types on how to keep better track of his intake (I had previously recommended downloading my fitness pal shaylee which he has) and encouraged regular exercise.  Follow-up: 4 months.      Isabel Daly  Pulmonary and Critical Care  [id number]

## 2024-04-05 NOTE — PATIENT INSTRUCTIONS
Please use your Breo inhaler once a day every day whether or not you are short of breath, this is not a rescue inhaler so do not use this if you are acutely short of breath.  Please be sure to gargle your mouth after using your Breo inhaler.  Please use your albuterol inhaler 2 puffs up to 6 times a day for rescue. If you are not short of breath, you do not need to use this.  Please work on a healthy diet and regular exercise.

## 2024-04-13 ENCOUNTER — MYC MEDICAL ADVICE (OUTPATIENT)
Dept: PULMONOLOGY | Facility: CLINIC | Age: 49
End: 2024-04-13
Payer: COMMERCIAL

## 2024-04-13 DIAGNOSIS — B37.0 THRUSH: Primary | ICD-10-CM

## 2024-04-15 RX ORDER — NYSTATIN 100000/ML
500000 SUSPENSION, ORAL (FINAL DOSE FORM) ORAL 4 TIMES DAILY
Qty: 200 ML | Refills: 0 | Status: SHIPPED | OUTPATIENT
Start: 2024-04-15 | End: 2024-04-25

## 2024-05-31 ENCOUNTER — OFFICE VISIT (OUTPATIENT)
Dept: URGENT CARE | Facility: URGENT CARE | Age: 49
End: 2024-05-31
Payer: COMMERCIAL

## 2024-05-31 ENCOUNTER — ANCILLARY PROCEDURE (OUTPATIENT)
Dept: GENERAL RADIOLOGY | Facility: CLINIC | Age: 49
End: 2024-05-31
Attending: NURSE PRACTITIONER
Payer: COMMERCIAL

## 2024-05-31 VITALS
BODY MASS INDEX: 39.87 KG/M2 | HEART RATE: 78 BPM | TEMPERATURE: 98.3 F | OXYGEN SATURATION: 95 % | RESPIRATION RATE: 16 BRPM | SYSTOLIC BLOOD PRESSURE: 124 MMHG | DIASTOLIC BLOOD PRESSURE: 84 MMHG | WEIGHT: 315 LBS

## 2024-05-31 DIAGNOSIS — M25.561 ACUTE PAIN OF RIGHT KNEE: ICD-10-CM

## 2024-05-31 DIAGNOSIS — M25.562 ACUTE PAIN OF LEFT KNEE: Primary | ICD-10-CM

## 2024-05-31 DIAGNOSIS — M25.562 ACUTE PAIN OF LEFT KNEE: ICD-10-CM

## 2024-05-31 PROCEDURE — 99214 OFFICE O/P EST MOD 30 MIN: CPT | Performed by: NURSE PRACTITIONER

## 2024-05-31 PROCEDURE — 73565 X-RAY EXAM OF KNEES: CPT | Mod: TC | Performed by: RADIOLOGY

## 2024-05-31 RX ORDER — PREDNISONE 20 MG/1
TABLET ORAL
Qty: 20 TABLET | Refills: 0 | Status: SHIPPED | OUTPATIENT
Start: 2024-05-31

## 2024-05-31 NOTE — PROGRESS NOTES
SUBJECTIVE:   Mark Conn is a 48 year old male presenting with a chief complaint of   Chief Complaint   Patient presents with    Knee Pain     Bilateral knee pain x 1 week, right side hurts more. Pain is similar to ED visit on 11/5/22 for left knee pain.       Past Medical History:   Diagnosis Date    Other acne      Family History   Problem Relation Age of Onset    Hypertension Mother     Lipids Mother     Diabetes Mother     Thyroid Disease Father     Other Cancer Father 65        Gastric-spread to the bones, liver.    Diabetes Maternal Grandmother     Hypertension Maternal Grandfather     Diabetes Paternal Grandmother     Thyroid Disease Sister         Hyperthyroid.     Current Outpatient Medications   Medication Sig Dispense Refill    albuterol (PROAIR HFA/PROVENTIL HFA/VENTOLIN HFA) 108 (90 Base) MCG/ACT inhaler Inhale 2 puffs into the lungs every 6 hours as needed for shortness of breath or wheezing 18 g 3    allopurinol (ZYLOPRIM) 300 MG tablet Take 1 tablet (300 mg) by mouth daily 90 tablet 3    fluticasone-vilanterol (BREO ELLIPTA) 200-25 MCG/ACT inhaler Inhale 1 puff into the lungs daily 1 each 11    gabapentin (NEURONTIN) 300 MG capsule Take 1 capsule (300 mg) by mouth at bedtime 90 capsule 3    gemfibrozil (LOPID) 600 MG tablet Take 1 tablet (600 mg) by mouth 2 times daily (before meals) 180 tablet 3    lisinopril (ZESTRIL) 20 MG tablet TAKE 1 TABLET (20 MG) BY MOUTH DAILY 90 tablet 2    metoprolol succinate ER (TOPROL XL) 50 MG 24 hr tablet Take 1 tablet (50 mg) by mouth daily 90 tablet 0    montelukast (SINGULAIR) 10 MG tablet Take 1 tablet (10 mg) by mouth at bedtime 90 tablet 3    multivitamin w/minerals (THERA-VIT-M) tablet Take 1 tablet by mouth 2 times daily.      Omega-3 Fatty Acids (OMEGA-3 FISH OIL PO)        Social History     Tobacco Use    Smoking status: Never    Smokeless tobacco: Never   Substance Use Topics    Alcohol use: Yes     Comment: Sociallly- not very often.        OBJECTIVE  /84   Pulse 78   Temp 98.3  F (36.8  C) (Tympanic)   Resp 16   Wt 144.7 kg (319 lb)   SpO2 95%   BMI 39.87 kg/m      Physical Exam  Musculoskeletal:        Legs:             Bilat AP standing knee xray: I have personally ordered and preliminarily reviewed the following xray, I have noted no acute abnormality, no fracture, no erosion.    ASSESSMENT:  1. Acute pain of left knee    - XR Knee AP Standing Bilateral; Future  - predniSONE (DELTASONE) 20 MG tablet; Take 3 tabs by mouth daily x 3 days, then 2 tabs daily x 3 days, then 1 tab daily x 3 days, then 1/2 tab daily x 3 days.  Dispense: 20 tablet; Refill: 0    2. Acute pain of right knee    - XR Knee AP Standing Bilateral; Future  - predniSONE (DELTASONE) 20 MG tablet; Take 3 tabs by mouth daily x 3 days, then 2 tabs daily x 3 days, then 1 tab daily x 3 days, then 1/2 tab daily x 3 days.  Dispense: 20 tablet; Refill: 0        PLAN:  Steroid as directed.  Follow up with Ortho if pain persists, otherwise follow up with your primary provider for ongoing concerns.   Do not take additional Ibuprofen or Naproxen while taking the steroid.

## 2024-05-31 NOTE — PATIENT INSTRUCTIONS
Steroid as directed.  Follow up with Ortho if pain persists, otherwise follow up with your primary provider for ongoing concerns.   Do not take additional Ibuprofen or Naproxen while taking the steroid.

## 2024-06-15 ENCOUNTER — HEALTH MAINTENANCE LETTER (OUTPATIENT)
Age: 49
End: 2024-06-15

## 2024-06-27 ENCOUNTER — TRANSFERRED RECORDS (OUTPATIENT)
Dept: HEALTH INFORMATION MANAGEMENT | Facility: CLINIC | Age: 49
End: 2024-06-27
Payer: COMMERCIAL

## 2024-06-28 ENCOUNTER — TELEPHONE (OUTPATIENT)
Dept: FAMILY MEDICINE | Facility: CLINIC | Age: 49
End: 2024-06-28
Payer: COMMERCIAL

## 2024-06-28 NOTE — TELEPHONE ENCOUNTER
Patient Quality Outreach    Patient is due for the following:   Colon Cancer Screening  Physical Preventive Adult Physical    Next Steps:   Patient has upcoming appointment, these items will be addressed at that time.    Type of outreach:    Chart review performed, no outreach needed.      Questions for provider review:    None           Marcia Vazquez CMA

## 2024-07-10 ENCOUNTER — PATIENT OUTREACH (OUTPATIENT)
Dept: CARE COORDINATION | Facility: CLINIC | Age: 49
End: 2024-07-10
Payer: COMMERCIAL

## 2024-07-25 ENCOUNTER — OFFICE VISIT (OUTPATIENT)
Dept: URGENT CARE | Facility: URGENT CARE | Age: 49
End: 2024-07-25
Payer: COMMERCIAL

## 2024-07-25 VITALS
BODY MASS INDEX: 40.25 KG/M2 | WEIGHT: 315 LBS | RESPIRATION RATE: 18 BRPM | SYSTOLIC BLOOD PRESSURE: 137 MMHG | OXYGEN SATURATION: 95 % | HEART RATE: 91 BPM | DIASTOLIC BLOOD PRESSURE: 89 MMHG | TEMPERATURE: 97.5 F

## 2024-07-25 DIAGNOSIS — H61.22 CERUMINOSIS, LEFT: ICD-10-CM

## 2024-07-25 DIAGNOSIS — H60.392 INFECTIVE OTITIS EXTERNA, LEFT: Primary | ICD-10-CM

## 2024-07-25 PROCEDURE — 99213 OFFICE O/P EST LOW 20 MIN: CPT

## 2024-07-25 RX ORDER — NEOMYCIN SULFATE, POLYMYXIN B SULFATE AND HYDROCORTISONE 10; 3.5; 1 MG/ML; MG/ML; [USP'U]/ML
3 SUSPENSION/ DROPS AURICULAR (OTIC) 3 TIMES DAILY
Qty: 10 ML | Refills: 0 | Status: SHIPPED | OUTPATIENT
Start: 2024-07-25 | End: 2024-08-01

## 2024-07-25 NOTE — PROGRESS NOTES
"URGENT CARE  Assessment & Plan   Assessment:   Mark Conn is a 49 year old male who's clinical presentation today is consistent with:   1. Infective otitis externa, left  - neomycin-polymyxin-hydrocortisone (CORTISPORIN) [id number] otic suspension;   Plan:  Will treat patient's otitis externa today with topical antibacterial otic drops . Educated patient that symptoms should resolve in 48-72hrs and they should return for follow up and further evaluation if there is no improvement in 2-3 days (possibly rule out fungal pathology). Encouraged patient to use tylenol or NSAIDs for pain management    Patient does have a lot of wax also in the left ear, recommend ear cleaning/flush at a later date when not having acute otitis externa symptoms. Recommend nurse visit for ear cleaning, order placed for future date   No alarm signs or symptoms present   Differential Diagnoses for this patient's chief complaint that I considered include:  AOM, OME, otitis externa, viral URI, other bacterial pathology, ceruminosis, eustachian tube dysfunction      Patient is} agreeable to treatment plan and state they will follow-up if symptoms do not improve and/or if symptoms worsen   see patient's AVS 'monitor for' section for specific patient instructions given and discussed regarding what to watch for and when to follow up    BLAISE Martell [facility] URGENT [facility] [place]      ______________________________________________________________________      Subjective     HPI: Mark Conn \"Kamran\"} is a 49 year old  male who presents today for evaluation the following concerns:   Patient presents today endorsing ear pain on left side, patient states the pain started 2-3 days ago   Symptoms include ear pain, fullness and pressure   Patient states feels like water in his ears    Patient deny any: fever/chills, malaise, vomiting, diarrhea, or headaches   Patient endorses external ear is tender to touch.     Review of " Systems:  Pertinent review of systems as reflected in HPI, otherwise negative.     Objective    Physical Exam:  Vitals:    07/25/24 1534   BP: 137/89   Pulse: 91   Resp: 18   Temp: 97.5  F (36.4  C)   TempSrc: Tympanic   SpO2: 95%   Weight: 146.1 kg (322 lb)      General alert and oriented, no acute distress, non ill-appearing   Vital signs reviewed: afebrile and normotensive     Psy/mental status: pleasant   EARS:   Right  - Canal is without swelling, erythema or cerumen  TM is intact, translucent gray in color, normal landmarks present, no bulging or erythema    Left -canal has a moderate amount of erythema and swelling,    Drainage noted as white and adherent to surrounding canal   Tragal tenderness to palpation   TM is only partially visualized due to moderate to large amount of cerumen, appears translucent gray in color     ______________________________________________________________________    I explained my diagnostic considerations and recommendations to the patient, who voiced understanding and agreement with the treatment plan.   All questions were answered.   We discussed potential side effects, risks and benefits of any prescribed or recommended therapies, as well as expectations for response to treatments.  Please see AVS for any patient instructions & handouts given.   Patient was advised to contact the Nurse Care Line, their Primary Care provider, Urgent Care, or the Emergency Department if there are new or worsening symptoms, or call 911 for emergencies.

## 2024-07-25 NOTE — PATIENT INSTRUCTIONS
Diagnosis: OTITIS EXTERNA (outer ear canal infection)    Plan:   Antibiotic drops as directed    Symptoms usually resolve in 48-72hrs   NSAIDs and tylenol for pain and to reduce swelling   Avoid submerging under water until healed   Return or follow up with PCP vs ENT if no improvement    Monitor for:   Ear pain becomes worse or doesn't improve after 3 days of treatment  Redness or swelling of the outer ear occurs or gets worse  Headache  Fever of 101 F  Seizure  Unusual drowsiness or confusion  Unusual painful or stiff neck        External Ear Infection  External otitis (also called  swimmer's ear ) is an infection in the ear canal. It's often caused by bacteria or fungus.   It can occur a few days after water gets trapped in the ear canal (from swimming or bathing). It can also occur after cleaning too deeply in the ear canal with a cotton swab or other object. Sometimes, hair care products get into the ear canal and cause this problem.  Symptoms can include pain, fever, itching, redness, drainage, or swelling of the ear canal. Temporary hearing loss may also occur.

## 2024-07-27 DIAGNOSIS — I10 BENIGN ESSENTIAL HYPERTENSION: ICD-10-CM

## 2024-07-29 RX ORDER — METOPROLOL SUCCINATE 50 MG/1
50 TABLET, EXTENDED RELEASE ORAL DAILY
Qty: 90 TABLET | Refills: 2 | Status: SHIPPED | OUTPATIENT
Start: 2024-07-29

## 2024-08-02 ENCOUNTER — ALLIED HEALTH/NURSE VISIT (OUTPATIENT)
Dept: FAMILY MEDICINE | Facility: CLINIC | Age: 49
End: 2024-08-02
Payer: COMMERCIAL

## 2024-08-02 DIAGNOSIS — H93.92 EAR PROBLEM, LEFT: Primary | ICD-10-CM

## 2024-08-02 PROCEDURE — 99207 PR NO CHARGE NURSE ONLY: CPT

## 2024-08-02 NOTE — PROGRESS NOTES
Patient presents for cerumen debridement of left ear per urgent care provider.  Upon examination, no cerumen is noted.  Radha RN, examined patient's ear as well and confirmed that no cerumen was noted.  Recommend that patient follow up with a primary care provider for further evaluation due to ongoing issues of not being able to ear out of the left ear.     Bailee Kahler on 8/2/2024 at 4:14 PM

## 2024-08-02 NOTE — PROGRESS NOTES
"Writer visualized both ear canals as well. No cerumen noted. Noted to have a small hole/perforation inside left ear. Kamran states his ear pain is gone, he denies fever or drainage. No facial swelling or redness. His hearing is \"muffled\" though. Informed this should heal on it's own. If muffled hearing does not resolve within several days or develops signs of infection, needs to be re-evaluated in person. Kamran verbalizes understanding.   Radha PADILLA RN    "

## 2024-08-04 ASSESSMENT — ASTHMA QUESTIONNAIRES
EMERGENCY_ROOM_LAST_YEAR_TOTAL: TWO
ACT_TOTALSCORE: 22
QUESTION_5 LAST FOUR WEEKS HOW WOULD YOU RATE YOUR ASTHMA CONTROL: WELL CONTROLLED
QUESTION_4 LAST FOUR WEEKS HOW OFTEN HAVE YOU USED YOUR RESCUE INHALER OR NEBULIZER MEDICATION (SUCH AS ALBUTEROL): ONCE A WEEK OR LESS
ACT_TOTALSCORE: 22
QUESTION_3 LAST FOUR WEEKS HOW OFTEN DID YOUR ASTHMA SYMPTOMS (WHEEZING, COUGHING, SHORTNESS OF BREATH, CHEST TIGHTNESS OR PAIN) WAKE YOU UP AT NIGHT OR EARLIER THAN USUAL IN THE MORNING: NOT AT ALL
QUESTION_2 LAST FOUR WEEKS HOW OFTEN HAVE YOU HAD SHORTNESS OF BREATH: ONCE OR TWICE A WEEK
QUESTION_1 LAST FOUR WEEKS HOW MUCH OF THE TIME DID YOUR ASTHMA KEEP YOU FROM GETTING AS MUCH DONE AT WORK, SCHOOL OR AT HOME: NONE OF THE TIME

## 2024-08-08 ENCOUNTER — OFFICE VISIT (OUTPATIENT)
Dept: PULMONOLOGY | Facility: CLINIC | Age: 49
End: 2024-08-08
Attending: INTERNAL MEDICINE
Payer: COMMERCIAL

## 2024-08-08 VITALS
SYSTOLIC BLOOD PRESSURE: 131 MMHG | OXYGEN SATURATION: 95 % | WEIGHT: 315 LBS | DIASTOLIC BLOOD PRESSURE: 65 MMHG | HEART RATE: 75 BPM | BODY MASS INDEX: 40 KG/M2

## 2024-08-08 DIAGNOSIS — J45.30 MILD PERSISTENT ASTHMA WITHOUT COMPLICATION: ICD-10-CM

## 2024-08-08 DIAGNOSIS — J45.51 SEVERE PERSISTENT ASTHMA WITH EXACERBATION (H): Primary | ICD-10-CM

## 2024-08-08 PROCEDURE — 99214 OFFICE O/P EST MOD 30 MIN: CPT | Performed by: INTERNAL MEDICINE

## 2024-08-08 RX ORDER — OMEPRAZOLE 40 MG/1
CAPSULE, DELAYED RELEASE ORAL
COMMUNITY
Start: 2024-07-27

## 2024-08-08 NOTE — PATIENT INSTRUCTIONS
Please switch to Arnuity inhaler 1 puff daily and remember to rinse your mouth after using this.  IF you start feeling short of breath, have chest tightness or wheezing on this new inhaler, go back to your Breo inhaler and let me know.  You're doing a great job with your healthy lifestyle changes! Keep it up!

## 2024-08-08 NOTE — PROGRESS NOTES
CCx:Follow-up for Asthma    HPI:Mr. Conn is a 49 year old gentleman with a past medical history significant for obesity, HTN, Asthma, gout and hypertriglyceridemia amongst other medical issues who presents to clinic today for the aforementioned chief complaint.  Since his last clinic visit with me, he has not used his rescue inhaler and states that he is uses nebulizer only when he was at his father-in-law's and he was exposed to tobacco smoke. Has identified his triggers which include exposure to cigarette smoke, pollen also acts as a trigger and he notes that when he was four wheeling up [place] last month he had a harder time.  Is using his Breo inhaler regularly but will occasionally feel like he gets thrush like symptoms. He has identified that consuming pop makes his thrush worse.   He continues to use his Claritin daily.  He feels like his pants are looser but he has not lost significant weight on the pain scale.  He thinks that he is eating better and that he is eating more aware of what he is putting into his body.    ROS:  Pertinent positives alluded to in the HPI. Remainder of 10 point ROS is negative.     PMH (Unchanged from previous visit):  Obesity  HTN  Asthma  Gout  Hypertriglyceridemia    PSH (Unchanged from previous visit):  Appendectomy  Right inguinal hernia repair  Sternal repair  Tonsillectomy    Allergies (Unchanged from previous visit):  Reviewed.     Family HX (Unchanged from previous visit):  Mother: HTN, DM, Cataracts  Father: Gastric cancer, HTN, hypercholesterolemia  Sister:Thyroid disease, RA, Anxiety    Social Hx (Unchanged from previous visit):  Marital status:   Number of children: 1 daughter  Resides in a house built in 1999, no concern for mold.  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  Pets: 2 dogs, 1 cat  Smoking history: 0  pack year history  Alcohol use: Sparingly   Recreational drug use: No  Hobbies: Shoot guns, fishing  Recent Travel: No    Current Meds:  Current Outpatient Medications   Medication Sig Dispense Refill    albuterol (PROAIR HFA/PROVENTIL HFA/VENTOLIN HFA) 108 (90 Base) MCG/ACT inhaler Inhale 2 puffs into the lungs every 6 hours as needed for shortness of breath or wheezing 18 g 3    allopurinol (ZYLOPRIM) 300 MG tablet Take 1 tablet (300 mg) by mouth daily 90 tablet 3    fluticasone-vilanterol (BREO ELLIPTA) 200-25 MCG/ACT inhaler Inhale 1 puff into the lungs daily 1 each 11    gabapentin (NEURONTIN) 300 MG capsule Take 1 capsule (300 mg) by mouth at bedtime 90 capsule 3    gemfibrozil (LOPID) 600 MG tablet Take 1 tablet (600 mg) by mouth 2 times daily (before meals) 180 tablet 3    lisinopril (ZESTRIL) 20 MG tablet TAKE 1 TABLET (20 MG) BY MOUTH DAILY 90 tablet 2    metoprolol succinate ER (TOPROL XL) 50 MG 24 hr tablet TAKE ONE TABLET BY MOUTH ONCE DAILY 90 tablet 2    montelukast (SINGULAIR) 10 MG tablet Take 1 tablet (10 mg) by mouth at bedtime 90 tablet 3    multivitamin w/minerals (THERA-VIT-M) tablet Take 1 tablet by mouth 2 times daily.      Omega-3 Fatty Acids (OMEGA-3 FISH OIL PO)       omeprazole (PRILOSEC) 40 MG DR capsule       predniSONE (DELTASONE) 20 MG tablet Take 3 tabs by mouth daily x 3 days, then 2 tabs daily x 3 days, then 1 tab daily x 3 days, then 1/2 tab daily x 3 days. 20 tablet 0       Labs:  Reviewed.    Imaging studies:  CT Chest w/o Contrast 1/13/24:  1.  Mild bronchitis with scattered mucous plugging. Mild bronchiectasis in the right middle lobe, suggesting chronic infectious or inflammatory etiology.  2.  No focal airspace consolidation.  3.  Single mildly enlarged subcarinal lymph node, nonspecific and may be reactive.    PFT's 2/9/24:  FEV1/FVC is 66% and is reduced.  FEV1 is 1.42L (34%) predicted and is reduced.  FVC is 2.15L (40%) predicted and reduced.  There  is not improvement in  spirometry after a single inhaled dose of bronchodilator.  TLC is 5.82L (73%) predicted and is reduced.  RV is 3.45L (148%) predicted and is increased.  DLCO is 26.63ml/min/hg (80%) predicted and is normal when it is corrected for hemoglobin.  Flow volume loops indicate scooping of the expiratory limb.    Impression:  Full Pulmonary Function Test is abnormal.  PFTs are consistent with very severe obstructive disease.  Spirometry is not consistent with reversibility.  There  is not  hyperinflation.  There  is  air-trapping.  Diffusion capacity when corrected for hemoglobin is  normal.    The ATS criteria for acceptability and reproducibility was met.    PFT's 1/18/18:  FEV1/FVC is 72% and is normal.  FEV1 is 2.19L (47%) predicted and is reduced.  FVC is 3.03L (51%) predicted and reduced.  Flow volume loops indicate scooping of the expiratory limb.    Impression:  Full Pulmonary Function Test is abnormal.  PFTs are consistent with severe obstructive disease.    PFT's 8/17/17:  FEV1/FVC is 78% and is normal.  FEV1 is 2.54L (54%) predicted and is reduced.  FVC is 3.26L (55%) predicted and reduced.  There  was no improvement in spirometry after a single inhaled dose of bronchodilator.  TLC is 6.05L (76%) predicted and is reduced.  RV is 2.75L (126%) predicted and is increased.  DLCO is 33.98ml/min/hg (84%) predicted and is normal when it is corrected for hemoglobin.  Flow volume loops indicate mild scooping of the expiratory limb.    Impression:  Full Pulmonary Function Test is abnormal.  PFTs are consistent with moderate-severe obstructive disease.  Spirometry is not consistent with reversibility.  There  is no  hyperinflation.  There  is  air-trapping.  Diffusion capacity when corrected for hemoglobin is normal.    The ATS criteria for acceptability and reproducibility was met.    Physical Exam:  /65 (BP Location: Left arm, Patient Position: Sitting, Cuff Size: Adult Large)   Pulse 75   Wt 145.2 kg (320 lb)    SpO2 95%   BMI 40.00 kg/m    NIOX 25ppm  Physical Exam  Constitutional:       Appearance: Normal appearance. He is obese.   HENT:      Head: Normocephalic.      Mouth/Throat:      Mouth: Mucous membranes are moist.   Cardiovascular:      Rate and Rhythm: Normal rate and regular rhythm.      Heart sounds: Normal heart sounds.   Pulmonary:      Effort: Pulmonary effort is normal.   Abdominal:      General: Abdomen is flat.   Neurological:      General: No focal deficit present.      Mental Status: He is alert and oriented to person, place, and time.           Assessment and Plan:Mark Conn is a 48 year old with a past medical history significant for obesity, HTN, Asthma, gout and hypertriglyceridemia who presents to clinic today for establishment of care for reactive airways disease is presently still having exacerbation.  His pulmonary function testing demonstrates air trapping chest imaging demonstrates mucous plugging with some bronchiectasis. For the present we would recommend;    Moderate persistent asthma: Based on symptoms and physical findings present.  Symptoms significantly improved on current  inhaled bronchodilator regimen.  He has also identified that  drinking sugary beverages made his thrush worse.  We have de-escalated his regimen to Arnuity Ellipta 1 puff daily.  He knows that if the symptoms worsen, he will switch back to Breo and let us know.  As needed albuterol for rescue.  Continue Prilosec 40 mg a day.  Continue Singulair.  Asthma action plan: Prednisone 40 mg once a day for 5 days.  Bronchiectasis: Noted on imaging and likely does not is taking a while to clear his asthma exacerbation.  No clear need for mucolytic agent presently.  HCM: Congratulated on his weight loss and lifestyle changes.  Follow-up: 6 months.      Isabel Daly  Pulmonary and Critical Care  1984

## 2024-08-11 ENCOUNTER — MYC MEDICAL ADVICE (OUTPATIENT)
Dept: FAMILY MEDICINE | Facility: CLINIC | Age: 49
End: 2024-08-11

## 2024-11-10 ENCOUNTER — OFFICE VISIT (OUTPATIENT)
Dept: URGENT CARE | Facility: URGENT CARE | Age: 49
End: 2024-11-10
Payer: COMMERCIAL

## 2024-11-10 VITALS
RESPIRATION RATE: 18 BRPM | OXYGEN SATURATION: 96 % | SYSTOLIC BLOOD PRESSURE: 126 MMHG | DIASTOLIC BLOOD PRESSURE: 88 MMHG | HEART RATE: 85 BPM

## 2024-11-10 DIAGNOSIS — R30.0 DYSURIA: Primary | ICD-10-CM

## 2024-11-10 DIAGNOSIS — E78.1 HYPERTRIGLYCERIDEMIA: ICD-10-CM

## 2024-11-10 LAB
ALBUMIN UR-MCNC: NEGATIVE MG/DL
ANION GAP SERPL CALCULATED.3IONS-SCNC: 8 MMOL/L (ref 7–15)
APPEARANCE UR: CLEAR
BILIRUB UR QL STRIP: NEGATIVE
BUN SERPL-MCNC: 10.8 MG/DL (ref 6–20)
CALCIUM SERPL-MCNC: 9.3 MG/DL (ref 8.8–10.4)
CHLORIDE SERPL-SCNC: 100 MMOL/L (ref 98–107)
CHOLEST SERPL-MCNC: 155 MG/DL
COLOR UR AUTO: YELLOW
CREAT SERPL-MCNC: 0.94 MG/DL (ref 0.67–1.17)
EGFRCR SERPLBLD CKD-EPI 2021: >90 ML/MIN/1.73M2
ERYTHROCYTE [DISTWIDTH] IN BLOOD BY AUTOMATED COUNT: 13.2 % (ref 10–15)
EST. AVERAGE GLUCOSE BLD GHB EST-MCNC: 117 MG/DL
FASTING STATUS PATIENT QL REPORTED: YES
FASTING STATUS PATIENT QL REPORTED: YES
GLUCOSE SERPL-MCNC: 115 MG/DL (ref 70–99)
GLUCOSE UR STRIP-MCNC: NEGATIVE MG/DL
HBA1C MFR BLD: 5.7 % (ref 0–5.6)
HCO3 SERPL-SCNC: 28 MMOL/L (ref 22–29)
HCT VFR BLD AUTO: 50.3 % (ref 40–53)
HDLC SERPL-MCNC: 35 MG/DL
HGB BLD-MCNC: 16.9 G/DL (ref 13.3–17.7)
HGB UR QL STRIP: NEGATIVE
KETONES UR STRIP-MCNC: NEGATIVE MG/DL
LDLC SERPL CALC-MCNC: 91 MG/DL
LEUKOCYTE ESTERASE UR QL STRIP: NEGATIVE
MCH RBC QN AUTO: 29.5 PG (ref 26.5–33)
MCHC RBC AUTO-ENTMCNC: 33.6 G/DL (ref 31.5–36.5)
MCV RBC AUTO: 88 FL (ref 78–100)
NITRATE UR QL: NEGATIVE
NONHDLC SERPL-MCNC: 120 MG/DL
PH UR STRIP: 6.5 [PH] (ref 5–7)
PLATELET # BLD AUTO: 230 10E3/UL (ref 150–450)
POTASSIUM SERPL-SCNC: 4.8 MMOL/L (ref 3.4–5.3)
PSA SERPL DL<=0.01 NG/ML-MCNC: 0.22 NG/ML (ref 0–3.5)
RBC # BLD AUTO: 5.73 10E6/UL (ref 4.4–5.9)
SODIUM SERPL-SCNC: 136 MMOL/L (ref 135–145)
SP GR UR STRIP: 1.02 (ref 1–1.03)
TRIGL SERPL-MCNC: 146 MG/DL
UROBILINOGEN UR STRIP-ACNC: 0.2 E.U./DL
WBC # BLD AUTO: 6.4 10E3/UL (ref 4–11)

## 2024-11-10 PROCEDURE — 83036 HEMOGLOBIN GLYCOSYLATED A1C: CPT | Performed by: NURSE PRACTITIONER

## 2024-11-10 PROCEDURE — 85027 COMPLETE CBC AUTOMATED: CPT | Performed by: NURSE PRACTITIONER

## 2024-11-10 PROCEDURE — 87086 URINE CULTURE/COLONY COUNT: CPT | Performed by: NURSE PRACTITIONER

## 2024-11-10 PROCEDURE — G0103 PSA SCREENING: HCPCS | Performed by: NURSE PRACTITIONER

## 2024-11-10 PROCEDURE — 80048 BASIC METABOLIC PNL TOTAL CA: CPT | Performed by: NURSE PRACTITIONER

## 2024-11-10 PROCEDURE — 81003 URINALYSIS AUTO W/O SCOPE: CPT | Performed by: NURSE PRACTITIONER

## 2024-11-10 PROCEDURE — 36415 COLL VENOUS BLD VENIPUNCTURE: CPT | Performed by: NURSE PRACTITIONER

## 2024-11-10 PROCEDURE — 99213 OFFICE O/P EST LOW 20 MIN: CPT | Performed by: NURSE PRACTITIONER

## 2024-11-10 PROCEDURE — 80061 LIPID PANEL: CPT | Performed by: NURSE PRACTITIONER

## 2024-11-10 NOTE — PROGRESS NOTES
SUBJECTIVE:   Mark Conn is a 49 year old male who  presents today for a possible UTI. Symptoms of dysuria and frequency have been going on for 3day(s).  Hematuria no.  still presentand mild.  There is no history of fever, chills, nausea or vomiting.  This patient does not have a history of urinary tract infections. Patient denies vaginal symptoms, pregnancy or STD concerns.    Past Medical History:   Diagnosis Date    Other acne      Current Outpatient Medications   Medication Sig Dispense Refill    albuterol (PROAIR HFA/PROVENTIL HFA/VENTOLIN HFA) 108 (90 Base) MCG/ACT inhaler Inhale 2 puffs into the lungs every 6 hours as needed for shortness of breath or wheezing 18 g 3    allopurinol (ZYLOPRIM) 300 MG tablet Take 1 tablet (300 mg) by mouth daily 90 tablet 3    fluticasone (ARNUITY ELLIPTA) 200 MCG/ACT inhaler Inhale 1 puff into the lungs daily 30 each 11    fluticasone-vilanterol (BREO ELLIPTA) 200-25 MCG/ACT inhaler Inhale 1 puff into the lungs daily 1 each 11    gabapentin (NEURONTIN) 300 MG capsule Take 1 capsule (300 mg) by mouth at bedtime 90 capsule 3    gemfibrozil (LOPID) 600 MG tablet Take 1 tablet (600 mg) by mouth 2 times daily (before meals) 180 tablet 3    lisinopril (ZESTRIL) 20 MG tablet TAKE 1 TABLET (20 MG) BY MOUTH DAILY 90 tablet 2    metoprolol succinate ER (TOPROL XL) 50 MG 24 hr tablet TAKE ONE TABLET BY MOUTH ONCE DAILY 90 tablet 2    montelukast (SINGULAIR) 10 MG tablet Take 1 tablet (10 mg) by mouth at bedtime 90 tablet 3    multivitamin w/minerals (THERA-VIT-M) tablet Take 1 tablet by mouth 2 times daily.      Omega-3 Fatty Acids (OMEGA-3 FISH OIL PO)       omeprazole (PRILOSEC) 40 MG DR capsule       predniSONE (DELTASONE) 20 MG tablet Take 3 tabs by mouth daily x 3 days, then 2 tabs daily x 3 days, then 1 tab daily x 3 days, then 1/2 tab daily x 3 days. 20 tablet 0         ROS:   Review of systems negative except as stated above.    OBJECTIVE:  /88 (BP Location: Right arm,  Patient Position: Sitting, Cuff Size: Adult Large)   Pulse 85   Resp 18   SpO2 96%   GENERAL APPEARANCE: healthy, alert and no distress  RESP: lungs clear to auscultation - no rales, rhonchi or wheezes  CV: regular rates and rhythm, normal S1 S2, no murmur noted  ABDOMEN:  soft, nontender, no HSM or masses and bowel sounds normal  BACK: No CVA tenderness  SKIN: no suspicious lesions or rashes    UA:   Results for orders placed or performed in visit on 11/10/24   UA Macroscopic with reflex to Microscopic and Culture - Clinic Collect     Status: Normal    Specimen: Urine, Clean Catch   Result Value Ref Range    Color Urine Yellow Colorless, Straw, Light Yellow, Yellow    Appearance Urine Clear Clear    Glucose Urine Negative Negative mg/dL    Bilirubin Urine Negative Negative    Ketones Urine Negative Negative mg/dL    Specific Gravity Urine 1.020 1.003 - 1.035    Blood Urine Negative Negative    pH Urine 6.5 5.0 - 7.0    Protein Albumin Urine Negative Negative mg/dL    Urobilinogen Urine 0.2 0.2, 1.0 E.U./dL    Nitrite Urine Negative Negative    Leukocyte Esterase Urine Negative Negative    Narrative    Microscopic not indicated   Lipid panel reflex to direct LDL Fasting     Status: Abnormal   Result Value Ref Range    Cholesterol 155 <200 mg/dL    Triglycerides 146 <150 mg/dL    Direct Measure HDL 35 (L) >=40 mg/dL    LDL Cholesterol Calculated 91 <100 mg/dL    Non HDL Cholesterol 120 <130 mg/dL    Patient Fasting > 8hrs? Yes     Narrative    Cholesterol  Desirable: < 200 mg/dL  Borderline High: 200 - 239 mg/dL  High: >= 240 mg/dL    Triglycerides  Normal: < 150 mg/dL  Borderline High: 150 - 199 mg/dL  High: 200-499 mg/dL  Very High: >= 500 mg/dL    Direct Measure HDL  Female: >= 50 mg/dL   Male: >= 40 mg/dL    LDL Cholesterol  Desirable: < 100 mg/dL  Above Desirable: 100 - 129 mg/dL   Borderline High: 130 - 159 mg/dL   High:  160 - 189 mg/dL   Very High: >= 190 mg/dL    Non HDL Cholesterol  Desirable: < 130  mg/dL  Above Desirable: 130 - 159 mg/dL  Borderline High: 160 - 189 mg/dL  High: 190 - 219 mg/dL  Very High: >= 220 mg/dL   Hemoglobin A1c     Status: Abnormal   Result Value Ref Range    Estimated Average Glucose 117 (H) <117 mg/dL    Hemoglobin A1C 5.7 (H) 0.0 - 5.6 %   PSA, screen     Status: Normal   Result Value Ref Range    Prostate Specific Antigen Screen 0.22 0.00 - 3.50 ng/mL    Narrative    This result is obtained using the Roche Elecsys total PSA method on the marley [id number] immunoassay analyzer, which is an ultrasensitive method. Results obtained with different assay methods or kits cannot be used interchangeably.  This test is intended for initial prostate cancer screening. PSA values exceeding the age-specific limits are suspicious for prostate disease, but additional testing, such as prostate biopsy, is needed to diagnose prostate pathology. The American Cancer Society recommends annual examination with digital rectal examination and serum PSA beginning at age 50 and for men with a life expectancy of at least 10 years after detection of prostate cancer. For men in high-risk groups, such as  Americans or men with a first-degree relative diagnosed at a younger age, testing should begin at a younger age. It is generally recommended that information be provided to patients about the benefits and limitations of testing and treatment so they can make informed decisions.   Basic metabolic panel  (Ca, Cl, CO2, Creat, Gluc, K, Na, BUN)     Status: Abnormal   Result Value Ref Range    Sodium 136 135 - 145 mmol/L    Potassium 4.8 3.4 - 5.3 mmol/L    Chloride 100 98 - 107 mmol/L    Carbon Dioxide (CO2) 28 22 - 29 mmol/L    Anion Gap 8 7 - 15 mmol/L    Urea Nitrogen 10.8 6.0 - 20.0 mg/dL    Creatinine 0.94 0.67 - 1.17 mg/dL    GFR Estimate >90 >60 mL/min/1.73m2    Calcium 9.3 8.8 - 10.4 mg/dL    Glucose 115 (H) 70 - 99 mg/dL    Patient Fasting > 8hrs? Yes    CBC with platelets     Status: Normal   Result Value  Ref Range    WBC Count 6.4 4.0 - 11.0 10e3/uL    RBC Count 5.73 4.40 - 5.90 10e6/uL    Hemoglobin 16.9 13.3 - 17.7 g/dL    Hematocrit 50.3 40.0 - 53.0 %    MCV 88 78 - 100 fL    MCH 29.5 26.5 - 33.0 pg    MCHC 33.6 31.5 - 36.5 g/dL    RDW 13.2 10.0 - 15.0 %    Platelet Count 230 150 - 450 10e3/uL   Urine Culture Aerobic Bacterial - lab collect     Status: None    Specimen: Urine, Clean Catch   Result Value Ref Range    Culture <10,000 CFU/mL Urogenital kwame          ASSESSMENT:       ICD-10-CM    1. Dysuria  R30.0 UA Macroscopic with reflex to Microscopic and Culture - Clinic Collect     Hemoglobin A1c     PSA, screen     Basic metabolic panel  (Ca, Cl, CO2, Creat, Gluc, K, Na, BUN)     CBC with platelets     Urine Culture Aerobic Bacterial - lab collect     Urine Culture Aerobic Bacterial - lab collect     Hemoglobin A1c     PSA, screen     Basic metabolic panel  (Ca, Cl, CO2, Creat, Gluc, K, Na, BUN)     CBC with platelets      2. Hypertriglyceridemia  E78.1 Lipid panel reflex to direct LDL Fasting          PLAN:    Drink plenty of fluids.  Prevention and treatment of UTI's discussed.  Signs and symptoms of pyelonephritis mentioned.   RTC if any worsening symptoms or if not improving as expected.    BLAISE Quinn CNP

## 2024-11-10 NOTE — PROGRESS NOTES
"SUBJECTIVE:   Mark Conn is a 49 year old male who  presents today for a possible UTI. Symptoms of dysuria and frequency have been going on for 2day(s).  Hematuria no.  {TIMINGm::\"sudden onset\"}and {SEVERITY:574646}.  There is no history of fever, chills, nausea or vomiting.  This patient {DOES_WM:177725} have a history of urinary tract infections. Patient denies vaginal symptoms, pregnancy or STD concerns.    Past Medical History:   Diagnosis Date    Other acne      Current Outpatient Medications   Medication Sig Dispense Refill    albuterol (PROAIR HFA/PROVENTIL HFA/VENTOLIN HFA) 108 (90 Base) MCG/ACT inhaler Inhale 2 puffs into the lungs every 6 hours as needed for shortness of breath or wheezing 18 g 3    allopurinol (ZYLOPRIM) 300 MG tablet Take 1 tablet (300 mg) by mouth daily 90 tablet 3    fluticasone (ARNUITY ELLIPTA) 200 MCG/ACT inhaler Inhale 1 puff into the lungs daily 30 each 11    fluticasone-vilanterol (BREO ELLIPTA) 200-25 MCG/ACT inhaler Inhale 1 puff into the lungs daily 1 each 11    gabapentin (NEURONTIN) 300 MG capsule Take 1 capsule (300 mg) by mouth at bedtime 90 capsule 3    gemfibrozil (LOPID) 600 MG tablet Take 1 tablet (600 mg) by mouth 2 times daily (before meals) 180 tablet 3    lisinopril (ZESTRIL) 20 MG tablet TAKE 1 TABLET (20 MG) BY MOUTH DAILY 90 tablet 2    metoprolol succinate ER (TOPROL XL) 50 MG 24 hr tablet TAKE ONE TABLET BY MOUTH ONCE DAILY 90 tablet 2    montelukast (SINGULAIR) 10 MG tablet Take 1 tablet (10 mg) by mouth at bedtime 90 tablet 3    multivitamin w/minerals (THERA-VIT-M) tablet Take 1 tablet by mouth 2 times daily.      Omega-3 Fatty Acids (OMEGA-3 FISH OIL PO)       omeprazole (PRILOSEC) 40 MG DR capsule       predniSONE (DELTASONE) 20 MG tablet Take 3 tabs by mouth daily x 3 days, then 2 tabs daily x 3 days, then 1 tab daily x 3 days, then 1/2 tab daily x 3 days. 20 tablet 0         ROS:   {UC ROS SHORT:145033}    OBJECTIVE:  /88 (BP " "Location: Right arm, Patient Position: Sitting, Cuff Size: Adult Large)   Pulse 85   Resp 18   SpO2 96%   {EXAM NORMAL:879564::\"GENERAL APPEARANCE: healthy, alert and no distress\",\"RESP: lungs clear to auscultation - no rales, rhonchi or wheezes\",\"CV: regular rates and rhythm, normal S1 S2, no murmur noted\",\"ABDOMEN:  soft, nontender, no HSM or masses and bowel sounds normal\",\"BACK: No CVA tenderness\",\"SKIN: no suspicious lesions or rashes\"}    UA: {UA DIP:5113}    ASSESSMENT:   Lower, uncomplicated urinary tract infection.    PLAN:  {adult antibx:419591}  Drink plenty of fluids.  Prevention and treatment of UTI's discussed.  Signs and symptoms of pyelonephritis mentioned.   RTC if any worsening symptoms or if not improving as expected.    Magda Almanza, APRN CNP         "

## 2024-11-11 LAB — BACTERIA UR CULT: NORMAL

## 2024-12-30 ENCOUNTER — OFFICE VISIT (OUTPATIENT)
Dept: URGENT CARE | Facility: URGENT CARE | Age: 49
End: 2024-12-30
Payer: COMMERCIAL

## 2024-12-30 VITALS
SYSTOLIC BLOOD PRESSURE: 135 MMHG | TEMPERATURE: 97.5 F | BODY MASS INDEX: 40 KG/M2 | HEART RATE: 87 BPM | RESPIRATION RATE: 18 BRPM | WEIGHT: 315 LBS | DIASTOLIC BLOOD PRESSURE: 89 MMHG | OXYGEN SATURATION: 96 %

## 2024-12-30 DIAGNOSIS — H65.92 OME (OTITIS MEDIA WITH EFFUSION), LEFT: ICD-10-CM

## 2024-12-30 DIAGNOSIS — J45.21 MILD INTERMITTENT ASTHMA WITH EXACERBATION: Primary | ICD-10-CM

## 2024-12-30 PROCEDURE — 99214 OFFICE O/P EST MOD 30 MIN: CPT | Performed by: NURSE PRACTITIONER

## 2024-12-30 RX ORDER — OFLOXACIN 3 MG/ML
5 SOLUTION AURICULAR (OTIC) 2 TIMES DAILY
Qty: 10 ML | Refills: 0 | Status: SHIPPED | OUTPATIENT
Start: 2024-12-30 | End: 2025-01-06

## 2024-12-30 RX ORDER — PREDNISONE 20 MG/1
40 TABLET ORAL DAILY
Qty: 10 TABLET | Refills: 0 | Status: SHIPPED | OUTPATIENT
Start: 2024-12-30 | End: 2025-01-04

## 2024-12-30 RX ORDER — IPRATROPIUM BROMIDE AND ALBUTEROL SULFATE 2.5; .5 MG/3ML; MG/3ML
1 SOLUTION RESPIRATORY (INHALATION) EVERY 6 HOURS PRN
Qty: 90 ML | Refills: 1 | Status: SHIPPED | OUTPATIENT
Start: 2024-12-30

## 2024-12-30 NOTE — PROGRESS NOTES
SUBJECTIVE:   Mark Conn is a 49 year old male presenting with a chief complaint of   Chief Complaint   Patient presents with    Breathing Problem     Asthma flare up, short of breath, wheezing x 1 week    Refill Request     Duoneb solution   No fevers; feels ok but is wheezing, and dry cough.    Past Medical History:   Diagnosis Date    Other acne      Family History   Problem Relation Age of Onset    Hypertension Mother     Lipids Mother     Diabetes Mother     Thyroid Disease Father     Other Cancer Father 65        Gastric-spread to the bones, liver.    Diabetes Maternal Grandmother     Hypertension Maternal Grandfather     Diabetes Paternal Grandmother     Thyroid Disease Sister         Hyperthyroid.     Current Outpatient Medications   Medication Sig Dispense Refill    albuterol (PROAIR HFA/PROVENTIL HFA/VENTOLIN HFA) 108 (90 Base) MCG/ACT inhaler Inhale 2 puffs into the lungs every 6 hours as needed for shortness of breath or wheezing 18 g 3    allopurinol (ZYLOPRIM) 300 MG tablet Take 1 tablet (300 mg) by mouth daily 90 tablet 3    fluticasone (ARNUITY ELLIPTA) 200 MCG/ACT inhaler Inhale 1 puff into the lungs daily 30 each 11    fluticasone-vilanterol (BREO ELLIPTA) 200-25 MCG/ACT inhaler Inhale 1 puff into the lungs daily 1 each 11    gabapentin (NEURONTIN) 300 MG capsule Take 1 capsule (300 mg) by mouth at bedtime 90 capsule 3    gemfibrozil (LOPID) 600 MG tablet Take 1 tablet (600 mg) by mouth 2 times daily (before meals) 180 tablet 3    lisinopril (ZESTRIL) 20 MG tablet TAKE 1 TABLET (20 MG) BY MOUTH DAILY 90 tablet 2    metoprolol succinate ER (TOPROL XL) 50 MG 24 hr tablet TAKE ONE TABLET BY MOUTH ONCE DAILY 90 tablet 2    montelukast (SINGULAIR) 10 MG tablet Take 1 tablet (10 mg) by mouth at bedtime 90 tablet 3    multivitamin w/minerals (THERA-VIT-M) tablet Take 1 tablet by mouth 2 times daily.      Omega-3 Fatty Acids (OMEGA-3 FISH OIL PO)       omeprazole (PRILOSEC) 40 MG DR capsule         Social History     Tobacco Use    Smoking status: Never     Passive exposure: Past (Dad was a smoker; Mom was a social smoker)    Smokeless tobacco: Never   Substance Use Topics    Alcohol use: Yes     Comment: Sociallly- not very often.       OBJECTIVE  /89   Pulse 87   Temp 97.5  F (36.4  C) (Tympanic)   Resp 18   Wt 145.2 kg (320 lb)   SpO2 96%   BMI 40.00 kg/m      Physical Exam  Constitutional:       Appearance: Normal appearance.   HENT:      Right Ear: Tympanic membrane normal.      Left Ear: Drainage present.      Ears:      Comments: Left TM partially obstructed by purulent drainage.  Cardiovascular:      Rate and Rhythm: Normal rate and regular rhythm.      Heart sounds: Normal heart sounds.   Pulmonary:      Effort: Pulmonary effort is normal.      Breath sounds: Wheezing (throughout posteriorly) present.   Neurological:      Mental Status: He is alert.         ASSESSMENT:  1. Mild intermittent asthma with exacerbation (Primary)    - predniSONE (DELTASONE) 20 MG tablet; Take 2 tablets (40 mg) by mouth daily for 5 days.  Dispense: 10 tablet; Refill: 0  - ipratropium - albuterol 0.5 mg/2.5 mg/3 mL (DUONEB) 0.5-2.5 (3) MG/3ML neb solution; Take 1 vial (3 mLs) by nebulization every 6 hours as needed for shortness of breath, wheezing or cough.  Dispense: 90 mL; Refill: 1    2. OME (otitis media with effusion), left    - ofloxacin (FLOXIN) 0.3 % otic solution; Place 5 drops Into the left ear 2 times daily for 7 days.  Dispense: 10 mL; Refill: 0      PLAN:  Ear drops to left ear twice a day for one week.  Prednisone daily for 5 days for wheezing.   Duonebs as needed and as used in the past for shortness of breath or wheezing.  Be seen again if not improving as expected.

## 2024-12-30 NOTE — PATIENT INSTRUCTIONS
Ear drops to left ear twice a day for one week.  Prednisone daily for 5 days for wheezing.   Duonebs as needed and as used in the past for shortness of breath or wheezing.  Be seen again if not improving as expected.

## 2025-01-04 DIAGNOSIS — I10 BENIGN ESSENTIAL HYPERTENSION: ICD-10-CM

## 2025-01-06 RX ORDER — LISINOPRIL 20 MG/1
20 TABLET ORAL DAILY
Qty: 90 TABLET | Refills: 3 | Status: SHIPPED | OUTPATIENT
Start: 2025-01-06

## 2025-01-12 ENCOUNTER — OFFICE VISIT (OUTPATIENT)
Dept: URGENT CARE | Facility: URGENT CARE | Age: 50
End: 2025-01-12
Payer: COMMERCIAL

## 2025-01-12 VITALS
TEMPERATURE: 97.1 F | HEART RATE: 117 BPM | BODY MASS INDEX: 40.5 KG/M2 | DIASTOLIC BLOOD PRESSURE: 88 MMHG | OXYGEN SATURATION: 98 % | RESPIRATION RATE: 18 BRPM | SYSTOLIC BLOOD PRESSURE: 130 MMHG | WEIGHT: 315 LBS

## 2025-01-12 DIAGNOSIS — H92.12 EAR DRAINAGE, LEFT: ICD-10-CM

## 2025-01-12 DIAGNOSIS — H73.92 ABNORMAL TYMPANIC MEMBRANE OF LEFT EAR: Primary | ICD-10-CM

## 2025-01-12 PROCEDURE — 69209 REMOVE IMPACTED EAR WAX UNI: CPT | Mod: LT | Performed by: PHYSICIAN ASSISTANT

## 2025-01-12 PROCEDURE — 99213 OFFICE O/P EST LOW 20 MIN: CPT | Mod: 25 | Performed by: PHYSICIAN ASSISTANT

## 2025-01-12 NOTE — PROGRESS NOTES
"  Assessment & Plan     Abnormal tympanic membrane of left ear  TM is abnormal on exam. Would recommend follow up with ENT. Patient agrees with plan.   - Adult ENT  Referral; Future    Ear drainage, left    - [place] REMOVAL IMPACTED CERUMEN IRRIGATION/LVG UNILAT          BMI  Estimated body mass index is 40.5 kg/m  as calculated from the following:    Height as of 2/9/24: 1.905 m (6' 3\").    Weight as of this encounter: 147 kg (324 lb).             Return in about 1 week (around 1/19/2025) for Follow up.          Subjective   Chief Complaint   Patient presents with    Ear Problem     Left ear pain, started beginning of December, still muffled. 2x in 5 months having issue with that ear.          HPI       Ear problem     Onset of symptoms was 1 month(s) ago.  Course of illness is same.    Severity moderate  Current and Associated symptoms: left ear pressure, muffled hearing   Treatment measures tried include Augmentin  Predisposing factors include treated for ear infection 1 month ago.                      Objective    /88   Pulse 117   Temp 97.1  F (36.2  C) (Tympanic)   Resp 18   Wt 147 kg (324 lb)   SpO2 98%   BMI 40.50 kg/m    Body mass index is 40.5 kg/m .  Physical Exam  Constitutional:       General: He is not in acute distress.  HENT:      Right Ear: Tympanic membrane and ear canal normal.      Left Ear: Drainage present.      Ears:      Comments: There is drainage in left ear. Ear lavage was performed and left canal was cleared. Left TM appeared abnormal with what looked like a pink fleshy growth      Neurological:      Mental Status: He is alert.                    Signed Electronically by: Allison Medina PA-C    "

## 2025-01-13 ENCOUNTER — TELEPHONE (OUTPATIENT)
Dept: OTOLARYNGOLOGY | Facility: CLINIC | Age: 50
End: 2025-01-13
Payer: COMMERCIAL

## 2025-01-13 NOTE — TELEPHONE ENCOUNTER
This encounter is being sent to inform the clinic that this patient has a referral from Allison Medina PA-C  for the diagnoses of Abnormal TM and has requested that this patient be seen within 3-5 days .  Based on the availability of our provider(s), we are unable to accommodate this request.    Were all sites offered this patient?  Yes, dx not in our protocols, patient stated the referring has a concern regarding a growth or skin tag on the TM of the right ear.    Sending to [place] per patient preference    Does scheduling algorithm request to schedule next available?  Patient appointment has not been scheduled.  Please review the referral request for accommodation and contact the patient.  If unable to accommodate, please resubmit a referral and indicate a preferred partner or affiliate location using Provider Finder or Scheduling Instructions field.

## 2025-01-13 NOTE — TELEPHONE ENCOUNTER
Outgoing call: No pain, no fever, ear was flushed yesterday in urgent care, over last few months has had 2 ear infections in the same ear. Provider sent the referral 3-5 days but hoping for 3 weeks. Will route to schedulers to get an audio/ENT appt setup.     Routing to scheduling puddle for next available audio/ENT and if over a month out will have to send to other sites as well.

## 2025-01-14 NOTE — TELEPHONE ENCOUNTER
Spoke with patient was able to schedule him in [place] 2/5/25. Audio @11:30, ok per audiologist. Then will see [place] at 1:30, pt is aware of the gap between appointments. [place], [facility]

## 2025-01-30 NOTE — PROGRESS NOTES
Chief Complaint   Patient presents with    Otalgia     Abnormal tympanic membrane of left ear, ear pain     History of Present Illness  Mark Conn is a 49 year old male who presents to me today for ear evaluation. Referred by Allison Medina PA-C for concerns of a left TM rupture. The patient was seen through  on 01/12/25 for concerns of left ear drainage. He was noted to have a TM rupture of the left ear. Referred to ENT.   The patient reports hearing loss or a plugged feeling in left ear.  It has been present and noticeable for approximately since the end of July 2024.  Then the end of December 2024 and he was told he had an ear infection. Then was seen for his asthma and told that he had a growth. He was placed on drops.   He does have a faint buzzing sound.   He notes exposure to recreational, , and work related noise exposure. He notes he wears ear protection. He wears hearing aids when he is not at work.     Past Medical History  Patient Active Problem List   Diagnosis    Pain in limb    Colitis    Hypertriglyceridemia    Malabsorption of glucose    CARDIOVASCULAR SCREENING; LDL GOAL LESS THAN 130    Benign essential hypertension    Obesity    Chronic gout without tophus, unspecified cause, unspecified site    Restrictive lung disease    Moderate persistent asthma, unspecified whether complicated    Hypoglycemia    Obesity (BMI 35.0-39.9) with comorbidity (H)    SVT (supraventricular tachycardia)    Severe persistent asthma with exacerbation (H)     Current Medications     Current Outpatient Medications:     albuterol (PROAIR HFA/PROVENTIL HFA/VENTOLIN HFA) 108 (90 Base) MCG/ACT inhaler, Inhale 2 puffs into the lungs every 6 hours as needed for shortness of breath or wheezing, Disp: 18 g, Rfl: 3    allopurinol (ZYLOPRIM) 300 MG tablet, Take 1 tablet (300 mg) by mouth daily, Disp: 90 tablet, Rfl: 3    fluticasone (ARNUITY ELLIPTA) 200 MCG/ACT inhaler, Inhale 1 puff into the lungs daily, Disp: 30  each, Rfl: 11    fluticasone-vilanterol (BREO ELLIPTA) 200-25 MCG/ACT inhaler, Inhale 1 puff into the lungs daily, Disp: 1 each, Rfl: 11    gabapentin (NEURONTIN) 300 MG capsule, Take 1 capsule (300 mg) by mouth at bedtime, Disp: 90 capsule, Rfl: 3    gemfibrozil (LOPID) 600 MG tablet, Take 1 tablet (600 mg) by mouth 2 times daily (before meals), Disp: 180 tablet, Rfl: 3    ipratropium - albuterol 0.5 mg/2.5 mg/3 mL (DUONEB) 0.5-2.5 (3) MG/3ML neb solution, Take 1 vial (3 mLs) by nebulization every 6 hours as needed for shortness of breath, wheezing or cough., Disp: 90 mL, Rfl: 1    lisinopril (ZESTRIL) 20 MG tablet, TAKE 1 TABLET (20 MG) BY MOUTH DAILY, Disp: 90 tablet, Rfl: 3    metoprolol succinate ER (TOPROL XL) 50 MG 24 hr tablet, TAKE ONE TABLET BY MOUTH ONCE DAILY, Disp: 90 tablet, Rfl: 2    montelukast (SINGULAIR) 10 MG tablet, Take 1 tablet (10 mg) by mouth at bedtime, Disp: 90 tablet, Rfl: 3    multivitamin w/minerals (THERA-VIT-M) tablet, Take 1 tablet by mouth 2 times daily., Disp: , Rfl:     Omega-3 Fatty Acids (OMEGA-3 FISH OIL PO), , Disp: , Rfl:     omeprazole (PRILOSEC) 40 MG DR capsule, , Disp: , Rfl:     Allergies  Allergies   Allergen Reactions    Ampicillin Rash    Erythromycin Rash    Keflex [Cephalexin Monohydrate] Rash    Morphine And Codeine Rash    Sulfa Antibiotics Rash       Social History   Social History     Socioeconomic History    Marital status:    Tobacco Use    Smoking status: Never     Passive exposure: Past (Dad was a smoker; Mom was a social smoker)    Smokeless tobacco: Never   Vaping Use    Vaping status: Never Used   Substance and Sexual Activity    Alcohol use: Yes     Comment: Sociallly- not very often.    Drug use: No    Sexual activity: Yes     Partners: Female   Other Topics Concern    Parent/sibling w/ CABG, MI or angioplasty before 65F 55M? No     Social Drivers of Health     Financial Resource Strain: Low Risk  (12/26/2023)    Financial Resource Strain      Within the past 12 months, have you or your family members you live with been unable to get utilities (heat, electricity) when it was really needed?: No   Food Insecurity: Low Risk  (12/26/2023)    Food Insecurity     Within the past 12 months, did you worry that your food would run out before you got money to buy more?: No     Within the past 12 months, did the food you bought just not last and you didn t have money to get more?: No   Transportation Needs: Low Risk  (12/26/2023)    Transportation Needs     Within the past 12 months, has lack of transportation kept you from medical appointments, getting your medicines, non-medical meetings or appointments, work, or from getting things that you need?: No   Interpersonal Safety: Low Risk  (12/29/2023)    Interpersonal Safety     Do you feel physically and emotionally safe where you currently live?: Yes     Within the past 12 months, have you been hit, slapped, kicked or otherwise physically hurt by someone?: No     Within the past 12 months, have you been humiliated or emotionally abused in other ways by your partner or ex-partner?: No   Housing Stability: Low Risk  (12/26/2023)    Housing Stability     Do you have housing? : Yes     Are you worried about losing your housing?: No       Family History  Family History   Problem Relation Age of Onset    Hypertension Mother     Lipids Mother     Diabetes Mother     Thyroid Disease Father     Other Cancer Father 65        Gastric-spread to the bones, liver.    Diabetes Maternal Grandmother     Hypertension Maternal Grandfather     Diabetes Paternal Grandmother     Thyroid Disease Sister         Hyperthyroid.       Review of Systems  As per HPI and PMHx, otherwise 10+ comprehensive system review is negative.    Physical Exam  There were no vitals taken for this visit.  GENERAL: Patient is a pleasant, cooperative 49 year old male in no acute distress.  HEAD: Normocephalic, atraumatic.  Hair and scalp are normal.  EYES:  Pupils are equal, round, reactive to light and accommodation.  Extraocular movements are intact.  The sclera nonicteric without injection.  The extraocular structures are normal.  EARS: Normal shape and symmetry.  No tenderness when palpating the mastoid or tragal areas bilaterally.  Otoscopic exam reveals no amount of cerumen bilaterally.  The RIGHT  tympanic membranes are round, intact without evidence of effusion, good landmarks.  No retraction, granulation, or drainage. LEFT - ear canal and TM appear to be inflamed. There was white thick drainage removed with suction.   NOSE: Nares are patent.  Nasal mucosa is pink.  ORAL CAVITY: Dentition is in good repair.  Mucous membranes are moist.  Tongue is mobile, protrudes to the midline.  Palate elevates symmetrically.  Tonsils are +1.  No erythema or exudate.  No oral cavity or oropharyngeal masses, lesions, ulcerations, leukoplakia.  NECK: Supple, trachea is midline.  There no palpable cervical lymphadenopathy or masses bilaterally.  Palpation of the bilateral parotid and submandibular areas reveal no masses.  No thyromegaly.    NEUROLOGIC: Cranial nerves II through XII are grossly intact.  Voice is strong.  Patient is House-Brackmann I/VI bilaterally.  CARDIOVASCULAR: Extremities are warm and well-perfused.  No significant peripheral edema.  RESPIRATORY: Patient has nonlabored breathing without cough, wheeze, stridor.  PSYCHIATRIC: Patient is alert and oriented.  Mood and affect appear normal.  SKIN: Warm and dry.  No scalp, face, or neck lesions noted.    Audiogram  The patient underwent an audiogram performed today.  My review of the audiogram shows normal hearing sensitivity through 500 HZ then a mild to moderate SNHL in the right and normal hearing sensitivity through 500 HZ then a mild to moderate SNHL.  Pure-tone average is 37 dB on the right and 47 dB on the left.  Speech reception threshold is 30 dB on the right and 40 dB on the left.  The patient had 100%  word recognition on the right and 84% word recognition on the left.  The patient had a A tympanogram on the right and a A tympanogram on the left.     Assessment and Plan     ICD-10-CM    1. Infective otitis externa, left  H60.392 ciprofloxacin-dexAMETHasone (CIPRODEX) 0.3-0.1 % otic suspension        It was my pleasure seeing Mark Conn today in clinic.  Based on patient history and physical examination, the patient has otitis externa  of the left ear. Symptoms most likely did not completely go away. Therefore, will prescribed ciprodex drops for the next 10 days. Then will follow up with the patient in 2-3 weeks.     BLAISE Carter CNP  Otolaryngology  [facility]

## 2025-02-01 ENCOUNTER — ANCILLARY PROCEDURE (OUTPATIENT)
Dept: GENERAL RADIOLOGY | Facility: CLINIC | Age: 50
End: 2025-02-01
Attending: PHYSICIAN ASSISTANT
Payer: COMMERCIAL

## 2025-02-01 ENCOUNTER — OFFICE VISIT (OUTPATIENT)
Dept: URGENT CARE | Facility: URGENT CARE | Age: 50
End: 2025-02-01
Payer: COMMERCIAL

## 2025-02-01 VITALS
DIASTOLIC BLOOD PRESSURE: 88 MMHG | WEIGHT: 315 LBS | SYSTOLIC BLOOD PRESSURE: 135 MMHG | RESPIRATION RATE: 16 BRPM | BODY MASS INDEX: 40.62 KG/M2 | OXYGEN SATURATION: 96 % | TEMPERATURE: 98.8 F | HEART RATE: 84 BPM

## 2025-02-01 DIAGNOSIS — J45.51 SEVERE PERSISTENT ASTHMA WITH EXACERBATION (H): Primary | ICD-10-CM

## 2025-02-01 DIAGNOSIS — R06.2 WHEEZING: ICD-10-CM

## 2025-02-01 DIAGNOSIS — R05.1 ACUTE COUGH: ICD-10-CM

## 2025-02-01 PROCEDURE — 94640 AIRWAY INHALATION TREATMENT: CPT | Performed by: PHYSICIAN ASSISTANT

## 2025-02-01 PROCEDURE — 71046 X-RAY EXAM CHEST 2 VIEWS: CPT | Mod: TC | Performed by: RADIOLOGY

## 2025-02-01 PROCEDURE — 99214 OFFICE O/P EST MOD 30 MIN: CPT | Mod: 25 | Performed by: PHYSICIAN ASSISTANT

## 2025-02-01 RX ORDER — PREDNISONE 20 MG/1
TABLET ORAL
Qty: 30 TABLET | Refills: 0 | Status: SHIPPED | OUTPATIENT
Start: 2025-02-01 | End: 2025-02-16

## 2025-02-01 RX ORDER — IPRATROPIUM BROMIDE AND ALBUTEROL SULFATE 2.5; .5 MG/3ML; MG/3ML
3 SOLUTION RESPIRATORY (INHALATION) ONCE
Status: COMPLETED | OUTPATIENT
Start: 2025-02-01 | End: 2025-02-01

## 2025-02-01 RX ADMIN — IPRATROPIUM BROMIDE AND ALBUTEROL SULFATE 3 ML: 2.5; .5 SOLUTION RESPIRATORY (INHALATION) at 09:28

## 2025-02-01 NOTE — PROGRESS NOTES
"  Assessment & Plan     Severe persistent asthma with exacerbation (H)  Will treat with prednisone taper. Continue with albuterol every 4-6 hrs as needed. Restart fluticasone inhaler. Patient has follow up with pulmonology in 1 week. Would recommend the ED if symptoms worsen or do not improve in 24-48 hours. Patient agrees with plan.      - predniSONE (DELTASONE) 20 MG tablet; Take 3 tablets (60 mg) by mouth daily for 5 days, THEN 2 tablets (40 mg) daily for 5 days, THEN 1 tablet (20 mg) daily for 5 days.    Wheezing    - XR Chest 2 Views; Future  - ipratropium - albuterol 0.5 mg/2.5 mg/3 mL (DUONEB) neb solution 3 mL    Acute cough    - XR Chest 2 Views; Future  - ipratropium - albuterol 0.5 mg/2.5 mg/3 mL (DUONEB) neb solution 3 mL          BMI  Estimated body mass index is 40.62 kg/m  as calculated from the following:    Height as of 2/9/24: 1.905 m (6' 3\").    Weight as of this encounter: 147.4 kg (325 lb).             Return in about 2 days (around 2/3/2025), or if symptoms worsen or fail to improve.              Subjective   Chief Complaint   Patient presents with    URI     Short of breath, wheezing x 3 days, neb and albuterol inhaler not working       HPI     URI Adult    Onset of symptoms was 3 day(s) ago.  Course of illness is worsening.    Severity moderate  Current and Associated symptoms: cough, shortness of breath   Treatment measures tried include albuterol nebs.  Predisposing factors include HX of asthma.                        Objective    /88   Pulse 84   Temp 98.8  F (37.1  C) (Tympanic)   Resp 16   Wt (!) 147.4 kg (325 lb)   SpO2 96%   BMI 40.62 kg/m    Body mass index is 40.62 kg/m .  Physical Exam  Constitutional:       General: He is not in acute distress.     Appearance: He is well-developed.   HENT:      Head: Normocephalic and atraumatic.   Eyes:      Conjunctiva/sclera: Conjunctivae normal.   Cardiovascular:      Rate and Rhythm: Normal rate and regular rhythm.   Pulmonary:     "  Effort: Pulmonary effort is normal.      Comments: Diffuse wheezing   Skin:     General: Skin is warm and dry.      Findings: No rash.   Psychiatric:         Behavior: Behavior normal.                  Signed Electronically by: Allison Medina PA-C

## 2025-02-05 ENCOUNTER — OFFICE VISIT (OUTPATIENT)
Dept: OTOLARYNGOLOGY | Facility: CLINIC | Age: 50
End: 2025-02-05
Payer: COMMERCIAL

## 2025-02-05 ENCOUNTER — OFFICE VISIT (OUTPATIENT)
Dept: AUDIOLOGY | Facility: CLINIC | Age: 50
End: 2025-02-05
Payer: COMMERCIAL

## 2025-02-05 VITALS
HEART RATE: 90 BPM | BODY MASS INDEX: 40 KG/M2 | OXYGEN SATURATION: 94 % | WEIGHT: 315 LBS | DIASTOLIC BLOOD PRESSURE: 85 MMHG | SYSTOLIC BLOOD PRESSURE: 139 MMHG

## 2025-02-05 DIAGNOSIS — H90.A32 MIXED CONDUCTIVE AND SENSORINEURAL HEARING LOSS OF LEFT EAR WITH RESTRICTED HEARING OF RIGHT EAR: Primary | ICD-10-CM

## 2025-02-05 DIAGNOSIS — H60.392 INFECTIVE OTITIS EXTERNA, LEFT: Primary | ICD-10-CM

## 2025-02-05 DIAGNOSIS — H90.A21 SENSORINEURAL HEARING LOSS (SNHL) OF RIGHT EAR WITH RESTRICTED HEARING OF LEFT EAR: ICD-10-CM

## 2025-02-05 PROCEDURE — 92550 TYMPANOMETRY & REFLEX THRESH: CPT | Performed by: AUDIOLOGIST

## 2025-02-05 PROCEDURE — 92557 COMPREHENSIVE HEARING TEST: CPT | Performed by: AUDIOLOGIST

## 2025-02-05 RX ORDER — CIPROFLOXACIN AND DEXAMETHASONE 3; 1 MG/ML; MG/ML
4 SUSPENSION/ DROPS AURICULAR (OTIC) 2 TIMES DAILY
Qty: 7.5 ML | Refills: 0 | Status: SHIPPED | OUTPATIENT
Start: 2025-02-05

## 2025-02-05 NOTE — PROGRESS NOTES
AUDIOLOGY REPORT:    Patient was referred to [facility] from ENT by Gordy WELSH CNP for a hearing examination. Patient reports a growth on his left tympanic membrane. Patient was unaccompanied to today's visit.     Testing:    Otoscopy:   Otoscopic exam indicates ears are clear of cerumen bilaterally     Tympanograms:    RIGHT: normal eardrum mobility     LEFT:   normal eardrum mobility    Reflexes (reported by stimulus ear): 1000 Hz  RIGHT: Ipsilateral is present at normal levels  RIGHT: Contralateral is elevated at frequencies tested  LEFT:   Ipsilateral is present at normal levels  LEFT:   Contralateral is present at normal levels    Thresholds:   Pure Tone Thresholds assessed using standard techniques audiometry with good  reliability from 250-8000 Hz bilaterally using insert earphones and circumaural headphones     RIGHT:  normal hearing sensitivity through 500 Hz then a mild to moderate sensorineural hearing loss    LEFT:    normal hearing sensitivity through 500 Hz then a mild to moderately severe mixed hearing loss   NOTE: Change in transducers did not merit a change in thresholds.     Speech Reception Threshold:    RIGHT: 30 dB HL    LEFT:   40 dB HL    Word Recognition Score:     RIGHT: 100% at 70 dB HL using NU-6 recorded word list.    LEFT:   84% at 80 dB HL using NU-6 recorded word list.    Discussed results with the patient.     Patient was returned to ENT for follow up.     Will Domínguez CCC-A  Licensed Audiologist  2/5/2025

## 2025-02-05 NOTE — PATIENT INSTRUCTIONS
You were seen by Gordy Thomas CNP.  If you have questions or concerns regarding your appointment today, you can reach out to our call center at [phone].  The following has been recommended at your appointment today:     the drops and use twice daily for 10 days. Make sure to lay on your side and put drops in. Stay in that position for at least 15-20 minutes.   Use a hair dryer to dry the canals after showers.   Ill see you back in 2-3 weeks.

## 2025-02-05 NOTE — LETTER
2/5/2025      Mark Conn  [id number] [address] [place] [id number]      Dear Colleague,    Thank you for referring your patient, Mark Conn, to the [facility]. Please see a copy of my visit note below.    Chief Complaint   Patient presents with     Otalgia     Abnormal tympanic membrane of left ear, ear pain     History of Present Illness  Mark Conn is a 49 year old male who presents to me today for ear evaluation. Referred by Allison Medina PA-C for concerns of a left TM rupture. The patient was seen through  on 01/12/25 for concerns of left ear drainage. He was noted to have a TM rupture of the left ear. Referred to ENT.   The patient reports hearing loss or a plugged feeling in left ear.  It has been present and noticeable for approximately since the end of July 2024.  Then the end of December 2024 and he was told he had an ear infection. Then was seen for his asthma and told that he had a growth. He was placed on drops.   He does have a faint buzzing sound.   He notes exposure to recreational, , and work related noise exposure. He notes he wears ear protection. He wears hearing aids when he is not at work.     Past Medical History  Patient Active Problem List   Diagnosis     Pain in limb     Colitis     Hypertriglyceridemia     Malabsorption of glucose     CARDIOVASCULAR SCREENING; LDL GOAL LESS THAN 130     Benign essential hypertension     Obesity     Chronic gout without tophus, unspecified cause, unspecified site     Restrictive lung disease     Moderate persistent asthma, unspecified whether complicated     Hypoglycemia     Obesity (BMI 35.0-39.9) with comorbidity (H)     SVT (supraventricular tachycardia)     Severe persistent asthma with exacerbation (H)     Current Medications     Current Outpatient Medications:      albuterol (PROAIR HFA/PROVENTIL HFA/VENTOLIN HFA) 108 (90 Base) MCG/ACT inhaler, Inhale 2 puffs into the lungs every 6 hours as needed for  shortness of breath or wheezing, Disp: 18 g, Rfl: 3     allopurinol (ZYLOPRIM) 300 MG tablet, Take 1 tablet (300 mg) by mouth daily, Disp: 90 tablet, Rfl: 3     fluticasone (ARNUITY ELLIPTA) 200 MCG/ACT inhaler, Inhale 1 puff into the lungs daily, Disp: 30 each, Rfl: 11     fluticasone-vilanterol (BREO ELLIPTA) 200-25 MCG/ACT inhaler, Inhale 1 puff into the lungs daily, Disp: 1 each, Rfl: 11     gabapentin (NEURONTIN) 300 MG capsule, Take 1 capsule (300 mg) by mouth at bedtime, Disp: 90 capsule, Rfl: 3     gemfibrozil (LOPID) 600 MG tablet, Take 1 tablet (600 mg) by mouth 2 times daily (before meals), Disp: 180 tablet, Rfl: 3     ipratropium - albuterol 0.5 mg/2.5 mg/3 mL (DUONEB) 0.5-2.5 (3) MG/3ML neb solution, Take 1 vial (3 mLs) by nebulization every 6 hours as needed for shortness of breath, wheezing or cough., Disp: 90 mL, Rfl: 1     lisinopril (ZESTRIL) 20 MG tablet, TAKE 1 TABLET (20 MG) BY MOUTH DAILY, Disp: 90 tablet, Rfl: 3     metoprolol succinate ER (TOPROL XL) 50 MG 24 hr tablet, TAKE ONE TABLET BY MOUTH ONCE DAILY, Disp: 90 tablet, Rfl: 2     montelukast (SINGULAIR) 10 MG tablet, Take 1 tablet (10 mg) by mouth at bedtime, Disp: 90 tablet, Rfl: 3     multivitamin w/minerals (THERA-VIT-M) tablet, Take 1 tablet by mouth 2 times daily., Disp: , Rfl:      Omega-3 Fatty Acids (OMEGA-3 FISH OIL PO), , Disp: , Rfl:      omeprazole (PRILOSEC) 40 MG DR capsule, , Disp: , Rfl:     Allergies  Allergies   Allergen Reactions     Ampicillin Rash     Erythromycin Rash     Keflex [Cephalexin Monohydrate] Rash     Morphine And Codeine Rash     Sulfa Antibiotics Rash       Social History   Social History     Socioeconomic History     Marital status:    Tobacco Use     Smoking status: Never     Passive exposure: Past (Dad was a smoker; Mom was a social smoker)     Smokeless tobacco: Never   Vaping Use     Vaping status: Never Used   Substance and Sexual Activity     Alcohol use: Yes     Comment: Sociallly- not  very often.     Drug use: No     Sexual activity: Yes     Partners: Female   Other Topics Concern     Parent/sibling w/ CABG, MI or angioplasty before 65F 55M? No     Social Drivers of Health     Financial Resource Strain: Low Risk  (12/26/2023)    Financial Resource Strain      Within the past 12 months, have you or your family members you live with been unable to get utilities (heat, electricity) when it was really needed?: No   Food Insecurity: Low Risk  (12/26/2023)    Food Insecurity      Within the past 12 months, did you worry that your food would run out before you got money to buy more?: No      Within the past 12 months, did the food you bought just not last and you didn t have money to get more?: No   Transportation Needs: Low Risk  (12/26/2023)    Transportation Needs      Within the past 12 months, has lack of transportation kept you from medical appointments, getting your medicines, non-medical meetings or appointments, work, or from getting things that you need?: No   Interpersonal Safety: Low Risk  (12/29/2023)    Interpersonal Safety      Do you feel physically and emotionally safe where you currently live?: Yes      Within the past 12 months, have you been hit, slapped, kicked or otherwise physically hurt by someone?: No      Within the past 12 months, have you been humiliated or emotionally abused in other ways by your partner or ex-partner?: No   Housing Stability: Low Risk  (12/26/2023)    Housing Stability      Do you have housing? : Yes      Are you worried about losing your housing?: No       Family History  Family History   Problem Relation Age of Onset     Hypertension Mother      Lipids Mother      Diabetes Mother      Thyroid Disease Father      Other Cancer Father 65        Gastric-spread to the bones, liver.     Diabetes Maternal Grandmother      Hypertension Maternal Grandfather      Diabetes Paternal Grandmother      Thyroid Disease Sister         Hyperthyroid.       Review of  Systems  As per HPI and PMHx, otherwise 10+ comprehensive system review is negative.    Physical Exam  There were no vitals taken for this visit.  GENERAL: Patient is a pleasant, cooperative 49 year old male in no acute distress.  HEAD: Normocephalic, atraumatic.  Hair and scalp are normal.  EYES: Pupils are equal, round, reactive to light and accommodation.  Extraocular movements are intact.  The sclera nonicteric without injection.  The extraocular structures are normal.  EARS: Normal shape and symmetry.  No tenderness when palpating the mastoid or tragal areas bilaterally.  Otoscopic exam reveals no amount of cerumen bilaterally.  The RIGHT  tympanic membranes are round, intact without evidence of effusion, good landmarks.  No retraction, granulation, or drainage. LEFT - ear canal and TM appear to be inflamed. There was white thick drainage removed with suction.   NOSE: Nares are patent.  Nasal mucosa is pink.  ORAL CAVITY: Dentition is in good repair.  Mucous membranes are moist.  Tongue is mobile, protrudes to the midline.  Palate elevates symmetrically.  Tonsils are +1.  No erythema or exudate.  No oral cavity or oropharyngeal masses, lesions, ulcerations, leukoplakia.  NECK: Supple, trachea is midline.  There no palpable cervical lymphadenopathy or masses bilaterally.  Palpation of the bilateral parotid and submandibular areas reveal no masses.  No thyromegaly.    NEUROLOGIC: Cranial nerves II through XII are grossly intact.  Voice is strong.  Patient is House-Brackmann I/VI bilaterally.  CARDIOVASCULAR: Extremities are warm and well-perfused.  No significant peripheral edema.  RESPIRATORY: Patient has nonlabored breathing without cough, wheeze, stridor.  PSYCHIATRIC: Patient is alert and oriented.  Mood and affect appear normal.  SKIN: Warm and dry.  No scalp, face, or neck lesions noted.    Audiogram  The patient underwent an audiogram performed today.  My review of the audiogram shows normal hearing  sensitivity through 500 HZ then a mild to moderate SNHL in the right and normal hearing sensitivity through 500 HZ then a mild to moderate SNHL.  Pure-tone average is 37 dB on the right and 47 dB on the left.  Speech reception threshold is 30 dB on the right and 40 dB on the left.  The patient had 100% word recognition on the right and 84% word recognition on the left.  The patient had a A tympanogram on the right and a A tympanogram on the left.     Assessment and Plan     ICD-10-CM    1. Infective otitis externa, left  H60.392 ciprofloxacin-dexAMETHasone (CIPRODEX) 0.3-0.1 % otic suspension        It was my pleasure seeing Mark Conn today in clinic.  Based on patient history and physical examination, the patient has otitis externa  of the left ear. Symptoms most likely did not completely go away. Therefore, will prescribed ciprodex drops for the next 10 days. Then will follow up with the patient in 2-3 weeks.     BLAISE Carter CNP  Otolaryngology  [facility]      Again, thank you for allowing me to participate in the care of your patient.        Sincerely,        BLAISE Carter CNP    Electronically signed

## 2025-02-05 NOTE — NURSING NOTE
No chief complaint on file.      There were no vitals filed for this visit.  Wt Readings from Last 1 Encounters:   02/01/25 (!) 147.4 kg (325 lb)       Reina Acosta MA

## 2025-02-17 NOTE — PROGRESS NOTES
Chief Complaint   Patient presents with    RECHECK     History of Present Illness  Mark Conn is a 49 year old male who presents to me today for ear evaluation. The patient last saw me on 02/05/25.     The patient was seen through  on 01/12/25 for concerns of left ear drainage. He was noted to have a TM rupture of the left ear. Referred to ENT.   The patient reports hearing loss or a plugged feeling in left ear.  It has been present and noticeable for approximately since the end of July 2024.  Then the end of December 2024 and he was told he had an ear infection. Then was seen for his asthma and told that he had a growth. He was placed on drops.   He does have a faint buzzing sound.   He notes exposure to recreational, , and work related noise exposure. He notes he wears ear protection. He wears hearing aids when he is not at work.     I treated him for otitis externa of the left ear with ciprodex drops for 10 days.     Today, he is here for a follow up. The patient is feeling much better. He doesn't have any pressure or ringing.     Past Medical History  Patient Active Problem List   Diagnosis    Pain in limb    Colitis    Hypertriglyceridemia    Malabsorption of glucose    CARDIOVASCULAR SCREENING; LDL GOAL LESS THAN 130    Benign essential hypertension    Obesity    Chronic gout without tophus, unspecified cause, unspecified site    Restrictive lung disease    Moderate persistent asthma, unspecified whether complicated    Hypoglycemia    Obesity (BMI 35.0-39.9) with comorbidity (H)    SVT (supraventricular tachycardia)    Severe persistent asthma with exacerbation (H)     Current Medications     Current Outpatient Medications:     albuterol (PROAIR HFA/PROVENTIL HFA/VENTOLIN HFA) 108 (90 Base) MCG/ACT inhaler, Inhale 2 puffs into the lungs every 6 hours as needed for shortness of breath or wheezing, Disp: 18 g, Rfl: 3    allopurinol (ZYLOPRIM) 300 MG tablet, Take 1 tablet (300 mg) by mouth daily,  Disp: 90 tablet, Rfl: 3    ciprofloxacin-dexAMETHasone (CIPRODEX) 0.3-0.1 % otic suspension, Place 4 drops Into the left ear 2 times daily. For the next 10 days., Disp: 7.5 mL, Rfl: 0    fluticasone (ARNUITY ELLIPTA) 200 MCG/ACT inhaler, Inhale 1 puff into the lungs daily, Disp: 30 each, Rfl: 11    fluticasone-vilanterol (BREO ELLIPTA) 200-25 MCG/ACT inhaler, Inhale 1 puff into the lungs daily., Disp: 60 each, Rfl: 11    gabapentin (NEURONTIN) 300 MG capsule, Take 1 capsule (300 mg) by mouth at bedtime, Disp: 90 capsule, Rfl: 3    gemfibrozil (LOPID) 600 MG tablet, Take 1 tablet (600 mg) by mouth 2 times daily (before meals), Disp: 180 tablet, Rfl: 3    ipratropium - albuterol 0.5 mg/2.5 mg/3 mL (DUONEB) 0.5-2.5 (3) MG/3ML neb solution, Take 1 vial (3 mLs) by nebulization every 6 hours as needed for shortness of breath, wheezing or cough., Disp: 90 mL, Rfl: 1    lisinopril (ZESTRIL) 20 MG tablet, TAKE 1 TABLET (20 MG) BY MOUTH DAILY, Disp: 90 tablet, Rfl: 3    metoprolol succinate ER (TOPROL XL) 50 MG 24 hr tablet, TAKE ONE TABLET BY MOUTH ONCE DAILY, Disp: 90 tablet, Rfl: 2    montelukast (SINGULAIR) 10 MG tablet, Take 1 tablet (10 mg) by mouth at bedtime, Disp: 90 tablet, Rfl: 3    multivitamin w/minerals (THERA-VIT-M) tablet, Take 1 tablet by mouth 2 times daily., Disp: , Rfl:     Omega-3 Fatty Acids (OMEGA-3 FISH OIL PO), , Disp: , Rfl:     omeprazole (PRILOSEC) 40 MG DR capsule, , Disp: , Rfl:     Allergies  Allergies   Allergen Reactions    Ampicillin Rash    Erythromycin Rash    Keflex [Cephalexin Monohydrate] Rash    Morphine And Codeine Rash    Sulfa Antibiotics Rash       Social History   Social History     Socioeconomic History    Marital status:    Tobacco Use    Smoking status: Never     Passive exposure: Past (Dad was a smoker; Mom was a social smoker)    Smokeless tobacco: Never   Vaping Use    Vaping status: Never Used   Substance and Sexual Activity    Alcohol use: Yes     Comment: Sociallly-  not very often.    Drug use: No    Sexual activity: Yes     Partners: Female   Other Topics Concern    Parent/sibling w/ CABG, MI or angioplasty before 65F 55M? No     Social Drivers of Health     Financial Resource Strain: Low Risk  (12/26/2023)    Financial Resource Strain     Within the past 12 months, have you or your family members you live with been unable to get utilities (heat, electricity) when it was really needed?: No   Food Insecurity: Low Risk  (12/26/2023)    Food Insecurity     Within the past 12 months, did you worry that your food would run out before you got money to buy more?: No     Within the past 12 months, did the food you bought just not last and you didn t have money to get more?: No   Transportation Needs: Low Risk  (12/26/2023)    Transportation Needs     Within the past 12 months, has lack of transportation kept you from medical appointments, getting your medicines, non-medical meetings or appointments, work, or from getting things that you need?: No   Interpersonal Safety: Low Risk  (12/29/2023)    Interpersonal Safety     Do you feel physically and emotionally safe where you currently live?: Yes     Within the past 12 months, have you been hit, slapped, kicked or otherwise physically hurt by someone?: No     Within the past 12 months, have you been humiliated or emotionally abused in other ways by your partner or ex-partner?: No   Housing Stability: Low Risk  (12/26/2023)    Housing Stability     Do you have housing? : Yes     Are you worried about losing your housing?: No       Family History  Family History   Problem Relation Age of Onset    Hypertension Mother     Lipids Mother     Diabetes Mother     Thyroid Disease Father     Other Cancer Father 65        Gastric-spread to the bones, liver.    Diabetes Maternal Grandmother     Hypertension Maternal Grandfather     Diabetes Paternal Grandmother     Thyroid Disease Sister         Hyperthyroid.       Review of Systems  As per HPI and  "PMHx, otherwise 10+ comprehensive system review is negative.    Physical Exam  BP (!) 144/84   Pulse 92   Temp 98.3  F (36.8  C) (Tympanic)   Ht 1.905 m (6' 3\")   Wt (!) 149.2 kg (329 lb)   SpO2 95%   BMI 41.12 kg/m    GENERAL: Patient is a pleasant, cooperative 49 year old male in no acute distress.  HEAD: Normocephalic, atraumatic.  Hair and scalp are normal.  EYES: Pupils are equal, round, reactive to light and accommodation.  Extraocular movements are intact.  The sclera nonicteric without injection.  The extraocular structures are normal.  EARS: Normal shape and symmetry.  No mastoid tenderness, fluctuance, or erythema.   NEUROLOGIC: Cranial nerves II through XII are grossly intact.  Voice is strong.  Patient is House-Brackmann I/VI bilaterally.  CARDIOVASCULAR: Extremities are warm and well-perfused.  No significant peripheral edema.  RESPIRATORY: Patient has nonlabored breathing without cough, wheeze, stridor.  PSYCHIATRIC: Patient is alert and oriented.  Mood and affect appear normal.  SKIN: Warm and dry.  No scalp, face, or neck lesions noted.    Audiogram - 02/05/25  The patient underwent an audiogram performed today.  My review of the audiogram shows normal hearing sensitivity through 500 Hz then a mild to moderate sensorineural hearing loss in the right ear and normal hearing sensitivity through 500 Hz then a mild to moderately severe mixed hearing loss .  Pure-tone average is 37 dB on the right and 47 dB on the left.  Speech reception threshold is 30 dB on the right and 40 dB on the left.  The patient had 100% word recognition on the right and 84% word recognition on the left.  The patient had a A tympanogram on the right and a A tympanogram on the left.        Assessment and Plan     ICD-10-CM    1. Infective otitis externa, left  H60.392         It was my pleasure seeing Mark Conn today in clinic. Overall, the patient's symptoms have improved. Follow up as needed.     Gordy Thomas, BLAISE " CNP

## 2025-02-19 ENCOUNTER — OFFICE VISIT (OUTPATIENT)
Dept: OTOLARYNGOLOGY | Facility: CLINIC | Age: 50
End: 2025-02-19
Payer: COMMERCIAL

## 2025-02-19 VITALS
TEMPERATURE: 98.3 F | DIASTOLIC BLOOD PRESSURE: 84 MMHG | HEIGHT: 75 IN | OXYGEN SATURATION: 95 % | SYSTOLIC BLOOD PRESSURE: 144 MMHG | WEIGHT: 315 LBS | HEART RATE: 92 BPM | BODY MASS INDEX: 39.17 KG/M2

## 2025-02-19 DIAGNOSIS — H90.A21 SENSORINEURAL HEARING LOSS (SNHL) OF RIGHT EAR WITH RESTRICTED HEARING OF LEFT EAR: ICD-10-CM

## 2025-02-19 DIAGNOSIS — H60.392 INFECTIVE OTITIS EXTERNA, LEFT: Primary | ICD-10-CM

## 2025-02-19 DIAGNOSIS — H90.A32 MIXED CONDUCTIVE AND SENSORINEURAL HEARING LOSS OF LEFT EAR WITH RESTRICTED HEARING OF RIGHT EAR: ICD-10-CM

## 2025-02-19 PROCEDURE — 99212 OFFICE O/P EST SF 10 MIN: CPT

## 2025-02-19 ASSESSMENT — PAIN SCALES - GENERAL: PAINLEVEL_OUTOF10: NO PAIN (0)

## 2025-02-19 NOTE — LETTER
2/19/2025      Mark Conn  [address] [address] [place] [id number]      Dear Colleague,    Thank you for referring your patient, Mark Conn, to the [facility]. Please see a copy of my visit note below.    Chief Complaint   Patient presents with     RECHECK     History of Present Illness  Mark Conn is a 49 year old male who presents to me today for ear evaluation. The patient last saw me on 02/05/25.     The patient was seen through  on 01/12/25 for concerns of left ear drainage. He was noted to have a TM rupture of the left ear. Referred to ENT.   The patient reports hearing loss or a plugged feeling in left ear.  It has been present and noticeable for approximately since the end of July 2024.  Then the end of December 2024 and he was told he had an ear infection. Then was seen for his asthma and told that he had a growth. He was placed on drops.   He does have a faint buzzing sound.   He notes exposure to recreational, , and work related noise exposure. He notes he wears ear protection. He wears hearing aids when he is not at work.     I treated him for otitis externa of the left ear with ciprodex drops for 10 days.     Today, he is here for a follow up. The patient is feeling much better. He doesn't have any pressure or ringing.     Past Medical History  Patient Active Problem List   Diagnosis     Pain in limb     Colitis     Hypertriglyceridemia     Malabsorption of glucose     CARDIOVASCULAR SCREENING; LDL GOAL LESS THAN 130     Benign essential hypertension     Obesity     Chronic gout without tophus, unspecified cause, unspecified site     Restrictive lung disease     Moderate persistent asthma, unspecified whether complicated     Hypoglycemia     Obesity (BMI 35.0-39.9) with comorbidity (H)     SVT (supraventricular tachycardia)     Severe persistent asthma with exacerbation (H)     Current Medications     Current Outpatient Medications:      albuterol (PROAIR  HFA/PROVENTIL HFA/VENTOLIN HFA) 108 (90 Base) MCG/ACT inhaler, Inhale 2 puffs into the lungs every 6 hours as needed for shortness of breath or wheezing, Disp: 18 g, Rfl: 3     allopurinol (ZYLOPRIM) 300 MG tablet, Take 1 tablet (300 mg) by mouth daily, Disp: 90 tablet, Rfl: 3     ciprofloxacin-dexAMETHasone (CIPRODEX) 0.3-0.1 % otic suspension, Place 4 drops Into the left ear 2 times daily. For the next 10 days., Disp: 7.5 mL, Rfl: 0     fluticasone (ARNUITY ELLIPTA) 200 MCG/ACT inhaler, Inhale 1 puff into the lungs daily, Disp: 30 each, Rfl: 11     fluticasone-vilanterol (BREO ELLIPTA) 200-25 MCG/ACT inhaler, Inhale 1 puff into the lungs daily., Disp: 60 each, Rfl: 11     gabapentin (NEURONTIN) 300 MG capsule, Take 1 capsule (300 mg) by mouth at bedtime, Disp: 90 capsule, Rfl: 3     gemfibrozil (LOPID) 600 MG tablet, Take 1 tablet (600 mg) by mouth 2 times daily (before meals), Disp: 180 tablet, Rfl: 3     ipratropium - albuterol 0.5 mg/2.5 mg/3 mL (DUONEB) 0.5-2.5 (3) MG/3ML neb solution, Take 1 vial (3 mLs) by nebulization every 6 hours as needed for shortness of breath, wheezing or cough., Disp: 90 mL, Rfl: 1     lisinopril (ZESTRIL) 20 MG tablet, TAKE 1 TABLET (20 MG) BY MOUTH DAILY, Disp: 90 tablet, Rfl: 3     metoprolol succinate ER (TOPROL XL) 50 MG 24 hr tablet, TAKE ONE TABLET BY MOUTH ONCE DAILY, Disp: 90 tablet, Rfl: 2     montelukast (SINGULAIR) 10 MG tablet, Take 1 tablet (10 mg) by mouth at bedtime, Disp: 90 tablet, Rfl: 3     multivitamin w/minerals (THERA-VIT-M) tablet, Take 1 tablet by mouth 2 times daily., Disp: , Rfl:      Omega-3 Fatty Acids (OMEGA-3 FISH OIL PO), , Disp: , Rfl:      omeprazole (PRILOSEC) 40 MG DR capsule, , Disp: , Rfl:     Allergies  Allergies   Allergen Reactions     Ampicillin Rash     Erythromycin Rash     Keflex [Cephalexin Monohydrate] Rash     Morphine And Codeine Rash     Sulfa Antibiotics Rash       Social History   Social History     Socioeconomic History     Marital  status:    Tobacco Use     Smoking status: Never     Passive exposure: Past (Dad was a smoker; Mom was a social smoker)     Smokeless tobacco: Never   Vaping Use     Vaping status: Never Used   Substance and Sexual Activity     Alcohol use: Yes     Comment: Sociallly- not very often.     Drug use: No     Sexual activity: Yes     Partners: Female   Other Topics Concern     Parent/sibling w/ CABG, MI or angioplasty before 65F 55M? No     Social Drivers of Health     Financial Resource Strain: Low Risk  (12/26/2023)    Financial Resource Strain      Within the past 12 months, have you or your family members you live with been unable to get utilities (heat, electricity) when it was really needed?: No   Food Insecurity: Low Risk  (12/26/2023)    Food Insecurity      Within the past 12 months, did you worry that your food would run out before you got money to buy more?: No      Within the past 12 months, did the food you bought just not last and you didn t have money to get more?: No   Transportation Needs: Low Risk  (12/26/2023)    Transportation Needs      Within the past 12 months, has lack of transportation kept you from medical appointments, getting your medicines, non-medical meetings or appointments, work, or from getting things that you need?: No   Interpersonal Safety: Low Risk  (12/29/2023)    Interpersonal Safety      Do you feel physically and emotionally safe where you currently live?: Yes      Within the past 12 months, have you been hit, slapped, kicked or otherwise physically hurt by someone?: No      Within the past 12 months, have you been humiliated or emotionally abused in other ways by your partner or ex-partner?: No   Housing Stability: Low Risk  (12/26/2023)    Housing Stability      Do you have housing? : Yes      Are you worried about losing your housing?: No       Family History  Family History   Problem Relation Age of Onset     Hypertension Mother      Lipids Mother      Diabetes Mother   "    Thyroid Disease Father      Other Cancer Father 65        Gastric-spread to the bones, liver.     Diabetes Maternal Grandmother      Hypertension Maternal Grandfather      Diabetes Paternal Grandmother      Thyroid Disease Sister         Hyperthyroid.       Review of Systems  As per HPI and PMHx, otherwise 10+ comprehensive system review is negative.    Physical Exam  BP (!) 144/84   Pulse 92   Temp 98.3  F (36.8  C) (Tympanic)   Ht 1.905 m (6' 3\")   Wt (!) 149.2 kg (329 lb)   SpO2 95%   BMI 41.12 kg/m    GENERAL: Patient is a pleasant, cooperative 49 year old male in no acute distress.  HEAD: Normocephalic, atraumatic.  Hair and scalp are normal.  EYES: Pupils are equal, round, reactive to light and accommodation.  Extraocular movements are intact.  The sclera nonicteric without injection.  The extraocular structures are normal.  EARS: Normal shape and symmetry.  No mastoid tenderness, fluctuance, or erythema.   NEUROLOGIC: Cranial nerves II through XII are grossly intact.  Voice is strong.  Patient is House-Brackmann I/VI bilaterally.  CARDIOVASCULAR: Extremities are warm and well-perfused.  No significant peripheral edema.  RESPIRATORY: Patient has nonlabored breathing without cough, wheeze, stridor.  PSYCHIATRIC: Patient is alert and oriented.  Mood and affect appear normal.  SKIN: Warm and dry.  No scalp, face, or neck lesions noted.    Audiogram - 02/05/25  The patient underwent an audiogram performed today.  My review of the audiogram shows normal hearing sensitivity through 500 Hz then a mild to moderate sensorineural hearing loss in the right ear and normal hearing sensitivity through 500 Hz then a mild to moderately severe mixed hearing loss .  Pure-tone average is 37 dB on the right and 47 dB on the left.  Speech reception threshold is 30 dB on the right and 40 dB on the left.  The patient had 100% word recognition on the right and 84% word recognition on the left.  The patient had a A " tympanogram on the right and a A tympanogram on the left.        Assessment and Plan     ICD-10-CM    1. Infective otitis externa, left  H60.392         It was my pleasure seeing Mark Conn today in clinic. Overall, the patient's symptoms have improved. Follow up as needed.     BLAISE Carter CNP      Again, thank you for allowing me to participate in the care of your patient.        Sincerely,        BLAISE Carter CNP    Electronically signed

## 2025-02-21 ENCOUNTER — TELEPHONE (OUTPATIENT)
Dept: FAMILY MEDICINE | Facility: CLINIC | Age: 50
End: 2025-02-21
Payer: COMMERCIAL

## 2025-02-21 NOTE — TELEPHONE ENCOUNTER
Forms/Letter Request    Type of form/letter: OTHER: [employer] form  DOT clearance due to history of Atrial Fibrillation/Flutter        Do we have the form/letter: Yes: Will place in Magda Cami's folder    Who is the form from? [employer] (if other please explain)    Where did/will the form come from? form was faxed in    When is form/letter needed by: Does not say    How would you like the form/letter returned: Fax : [phone]    Patient Notified form requests are processed in 5-7 business days:No    Could we send this information to you in [place] or would you prefer to receive a phone call?:

## 2025-02-22 DIAGNOSIS — B37.0 THRUSH: Primary | ICD-10-CM

## 2025-02-22 RX ORDER — NYSTATIN 100000 [USP'U]/ML
500000 SUSPENSION ORAL 4 TIMES DAILY
Qty: 200 ML | Refills: 3 | Status: SHIPPED | OUTPATIENT
Start: 2025-02-22 | End: 2025-03-04

## 2025-02-22 NOTE — PROGRESS NOTES
Have sent a prescription to the pharmacy for his recent diagnosis of thrush likely related to his inhaled steroid.    Isabel Daly MD  Pulmonary and Critical Care  [facility]

## 2025-02-28 ENCOUNTER — TELEPHONE (OUTPATIENT)
Dept: PULMONOLOGY | Facility: CLINIC | Age: 50
End: 2025-02-28
Payer: COMMERCIAL

## 2025-02-28 DIAGNOSIS — J45.51 SEVERE PERSISTENT ASTHMA WITH EXACERBATION (H): ICD-10-CM

## 2025-02-28 RX ORDER — OMEPRAZOLE 40 MG/1
CAPSULE, DELAYED RELEASE ORAL
Status: CANCELLED | OUTPATIENT
Start: 2025-02-28

## 2025-03-03 RX ORDER — OMEPRAZOLE 40 MG/1
40 CAPSULE, DELAYED RELEASE ORAL DAILY
Qty: 30 CAPSULE | Refills: 0 | Status: SHIPPED | OUTPATIENT
Start: 2025-03-03

## 2025-03-11 ENCOUNTER — TELEPHONE (OUTPATIENT)
Dept: FAMILY MEDICINE | Facility: CLINIC | Age: 50
End: 2025-03-11
Payer: COMMERCIAL

## 2025-03-11 ENCOUNTER — MYC MEDICAL ADVICE (OUTPATIENT)
Dept: FAMILY MEDICINE | Facility: CLINIC | Age: 50
End: 2025-03-11
Payer: COMMERCIAL

## 2025-03-11 ASSESSMENT — ASTHMA QUESTIONNAIRES
QUESTION_2 LAST FOUR WEEKS HOW OFTEN HAVE YOU HAD SHORTNESS OF BREATH: ONCE OR TWICE A WEEK
QUESTION_5 LAST FOUR WEEKS HOW WOULD YOU RATE YOUR ASTHMA CONTROL: WELL CONTROLLED
QUESTION_4 LAST FOUR WEEKS HOW OFTEN HAVE YOU USED YOUR RESCUE INHALER OR NEBULIZER MEDICATION (SUCH AS ALBUTEROL): ONCE A WEEK OR LESS
QUESTION_3 LAST FOUR WEEKS HOW OFTEN DID YOUR ASTHMA SYMPTOMS (WHEEZING, COUGHING, SHORTNESS OF BREATH, CHEST TIGHTNESS OR PAIN) WAKE YOU UP AT NIGHT OR EARLIER THAN USUAL IN THE MORNING: NOT AT ALL
ACT_TOTALSCORE: 21
ACT_TOTALSCORE: 21
EMERGENCY_ROOM_LAST_YEAR_TOTAL: THREE OR MORE
QUESTION_1 LAST FOUR WEEKS HOW MUCH OF THE TIME DID YOUR ASTHMA KEEP YOU FROM GETTING AS MUCH DONE AT WORK, SCHOOL OR AT HOME: A LITTLE OF THE TIME

## 2025-03-11 NOTE — TELEPHONE ENCOUNTER
Patient Quality Outreach    Patient is due for the following:   Asthma  -  ACT needed    Action(s) Taken:   Patient has upcoming appointment, these items will be addressed at that time.  Patient was assigned appropriate questionnaire to complete    Type of outreach:    Sent [employer] message.    Questions for provider review:    None           Marcia Vazquez CMA

## 2025-03-14 ENCOUNTER — MYC MEDICAL ADVICE (OUTPATIENT)
Dept: FAMILY MEDICINE | Facility: CLINIC | Age: 50
End: 2025-03-14
Payer: COMMERCIAL

## 2025-03-14 ASSESSMENT — ASTHMA QUESTIONNAIRES
QUESTION_3 LAST FOUR WEEKS HOW OFTEN DID YOUR ASTHMA SYMPTOMS (WHEEZING, COUGHING, SHORTNESS OF BREATH, CHEST TIGHTNESS OR PAIN) WAKE YOU UP AT NIGHT OR EARLIER THAN USUAL IN THE MORNING: NOT AT ALL
QUESTION_4 LAST FOUR WEEKS HOW OFTEN HAVE YOU USED YOUR RESCUE INHALER OR NEBULIZER MEDICATION (SUCH AS ALBUTEROL): ONCE A WEEK OR LESS
ACT_TOTALSCORE: 22
ACT_TOTALSCORE: 22
QUESTION_2 LAST FOUR WEEKS HOW OFTEN HAVE YOU HAD SHORTNESS OF BREATH: ONCE OR TWICE A WEEK
QUESTION_1 LAST FOUR WEEKS HOW MUCH OF THE TIME DID YOUR ASTHMA KEEP YOU FROM GETTING AS MUCH DONE AT WORK, SCHOOL OR AT HOME: NONE OF THE TIME
QUESTION_5 LAST FOUR WEEKS HOW WOULD YOU RATE YOUR ASTHMA CONTROL: WELL CONTROLLED
EMERGENCY_ROOM_LAST_YEAR_TOTAL: THREE OR MORE

## 2025-03-14 NOTE — TELEPHONE ENCOUNTER
PCP not in clinic. Refill encounter routed to covering provider for consideration.  Radha ROMANO, RN

## 2025-03-24 ENCOUNTER — VIRTUAL VISIT (OUTPATIENT)
Dept: FAMILY MEDICINE | Facility: CLINIC | Age: 50
End: 2025-03-24
Payer: COMMERCIAL

## 2025-03-24 DIAGNOSIS — M1A.9XX0 CHRONIC GOUT WITHOUT TOPHUS, UNSPECIFIED CAUSE, UNSPECIFIED SITE: ICD-10-CM

## 2025-03-24 DIAGNOSIS — I10 BENIGN ESSENTIAL HYPERTENSION: ICD-10-CM

## 2025-03-24 DIAGNOSIS — E78.1 HYPERTRIGLYCERIDEMIA: ICD-10-CM

## 2025-03-24 DIAGNOSIS — G62.9 NEUROPATHY: ICD-10-CM

## 2025-03-24 DIAGNOSIS — J45.40 MODERATE PERSISTENT ASTHMA, UNSPECIFIED WHETHER COMPLICATED: ICD-10-CM

## 2025-03-24 PROCEDURE — 98006 SYNCH AUDIO-VIDEO EST MOD 30: CPT | Performed by: NURSE PRACTITIONER

## 2025-03-24 RX ORDER — ALLOPURINOL 300 MG/1
1 TABLET ORAL DAILY
Qty: 90 TABLET | Refills: 3 | Status: SHIPPED | OUTPATIENT
Start: 2025-03-24

## 2025-03-24 RX ORDER — MONTELUKAST SODIUM 10 MG/1
1 TABLET ORAL AT BEDTIME
Qty: 90 TABLET | Refills: 2 | Status: SHIPPED | OUTPATIENT
Start: 2025-03-24

## 2025-03-24 RX ORDER — METOPROLOL SUCCINATE 50 MG/1
50 TABLET, EXTENDED RELEASE ORAL DAILY
Qty: 90 TABLET | Refills: 2 | Status: SHIPPED | OUTPATIENT
Start: 2025-03-24

## 2025-03-24 RX ORDER — GABAPENTIN 300 MG/1
300 CAPSULE ORAL AT BEDTIME
Qty: 90 CAPSULE | Refills: 2 | Status: SHIPPED | OUTPATIENT
Start: 2025-03-24

## 2025-03-24 RX ORDER — GEMFIBROZIL 600 MG/1
600 TABLET, FILM COATED ORAL
Qty: 180 TABLET | Refills: 1 | Status: SHIPPED | OUTPATIENT
Start: 2025-03-24

## 2025-03-24 NOTE — PROGRESS NOTES
"Kamran is a 49 year old who is being evaluated via a billable video visit.    How would you like to obtain your AVS? MyChart  If the video visit is dropped, the invitation should be resent by: Text to cell phone: [phone]  Will anyone else be joining your video visit? No      Assessment & Plan   Problem List Items Addressed This Visit          Respiratory    Moderate persistent asthma, unspecified whether complicated    Relevant Medications    montelukast (SINGULAIR) 10 MG tablet       Endocrine    Hypertriglyceridemia    Relevant Medications    gemfibrozil (LOPID) 600 MG tablet    Chronic gout without tophus, unspecified cause, unspecified site    Relevant Medications    allopurinol (ZYLOPRIM) 300 MG tablet       Circulatory    Benign essential hypertension    Relevant Medications    metoprolol succinate ER (TOPROL XL) 50 MG 24 hr tablet     Other Visit Diagnoses       Neuropathy        Relevant Medications    gabapentin (NEURONTIN) 300 MG capsule                  BMI  Estimated body mass index is 41.12 kg/m  as calculated from the following:    Height as of 2/19/25: 1.905 m (6' 3\").    Weight as of 2/19/25: 149.2 kg (329 lb).   Weight management plan: Discussed healthy diet and exercise guidelines    See Patient Instructions      Subjective   Kamran is a 49 year old, presenting for the following health issues:  Hypertension and Arthritis        3/24/2025     1:27 PM   Additional Questions   Roomed by Marcia     History of Present Illness       Reason for visit:  Med check   He is taking medications regularly.        Hypertension Follow-up    Do you check your blood pressure regularly outside of the clinic? Yes   Are you following a low salt diet? Yes  Are your blood pressures ever more than 140 on the top number (systolic) OR more   than 90 on the bottom number (diastolic), for example 140/90? No  How many servings of fruits and vegetables do you eat daily?  0-1  On average, how many sweetened beverages do you drink " each day (Examples: soda, juice, sweet tea, etc.  Do NOT count diet or artificially sweetened beverages)?   2  How many days per week do you exercise enough to make your heart beat faster? 3 or less  How many minutes a day do you exercise enough to make your heart beat faster? 20 - 29  How many days per week do you miss taking your medication? 0        Review of Systems  Constitutional, neuro, ENT, endocrine, pulmonary, cardiac, gastrointestinal, genitourinary, musculoskeletal, integument and psychiatric systems are negative, except as otherwise noted.      Objective    Vitals - Patient Reported  Systolic (Patient Reported): 139  Diastolic (Patient Reported): 73  Weight (Patient Reported): 144.2 kg (318 lb)      Vitals:  No vitals were obtained today due to virtual visit.    Physical Exam   GENERAL: alert and no distress  EYES: Eyes grossly normal to inspection.  No discharge or erythema, or obvious scleral/conjunctival abnormalities.  RESP: No audible wheeze, cough, or visible cyanosis.    SKIN: Visible skin clear. No significant rash, abnormal pigmentation or lesions.  NEURO: Cranial nerves grossly intact.  Mentation and speech appropriate for age.  PSYCH: Appropriate affect, tone, and pace of words    No results found for this or any previous visit (from the past 24 hours).      Video-Visit Details    Type of service:  Video Visit   Originating Location (pt. Location): Home    Distant Location (provider location):  On-site  Platform used for Video Visit: Virgilio  Signed Electronically by: BLAISE Quinn CNP

## 2025-04-07 ENCOUNTER — TELEPHONE (OUTPATIENT)
Dept: FAMILY MEDICINE | Facility: CLINIC | Age: 50
End: 2025-04-07
Payer: COMMERCIAL

## 2025-04-09 ENCOUNTER — MYC REFILL (OUTPATIENT)
Dept: PULMONOLOGY | Facility: CLINIC | Age: 50
End: 2025-04-09
Payer: COMMERCIAL

## 2025-04-09 DIAGNOSIS — J45.51 SEVERE PERSISTENT ASTHMA WITH EXACERBATION (H): ICD-10-CM

## 2025-04-10 RX ORDER — OMEPRAZOLE 40 MG/1
40 CAPSULE, DELAYED RELEASE ORAL DAILY
Qty: 30 CAPSULE | Refills: 10 | Status: SHIPPED | OUTPATIENT
Start: 2025-04-10

## 2025-05-21 ENCOUNTER — OFFICE VISIT (OUTPATIENT)
Dept: URGENT CARE | Facility: URGENT CARE | Age: 50
End: 2025-05-21
Payer: COMMERCIAL

## 2025-05-21 VITALS
TEMPERATURE: 98.1 F | OXYGEN SATURATION: 97 % | WEIGHT: 310 LBS | HEART RATE: 55 BPM | BODY MASS INDEX: 38.54 KG/M2 | DIASTOLIC BLOOD PRESSURE: 77 MMHG | RESPIRATION RATE: 18 BRPM | HEIGHT: 75 IN | SYSTOLIC BLOOD PRESSURE: 120 MMHG

## 2025-05-21 DIAGNOSIS — R19.7 DIARRHEA, UNSPECIFIED TYPE: Primary | ICD-10-CM

## 2025-05-21 DIAGNOSIS — R10.84 ABDOMINAL PAIN, GENERALIZED: ICD-10-CM

## 2025-05-21 LAB
BASOPHILS # BLD AUTO: 0.1 10E3/UL (ref 0–0.2)
BASOPHILS NFR BLD AUTO: 1 %
EOSINOPHIL # BLD AUTO: 0.2 10E3/UL (ref 0–0.7)
EOSINOPHIL NFR BLD AUTO: 2 %
ERYTHROCYTE [DISTWIDTH] IN BLOOD BY AUTOMATED COUNT: 12.9 % (ref 10–15)
HCT VFR BLD AUTO: 47.7 % (ref 40–53)
HGB BLD-MCNC: 16.3 G/DL (ref 13.3–17.7)
IMM GRANULOCYTES # BLD: 0 10E3/UL
IMM GRANULOCYTES NFR BLD: 0 %
LYMPHOCYTES # BLD AUTO: 2.7 10E3/UL (ref 0.8–5.3)
LYMPHOCYTES NFR BLD AUTO: 26 %
MCH RBC QN AUTO: 29.8 PG (ref 26.5–33)
MCHC RBC AUTO-ENTMCNC: 34.2 G/DL (ref 31.5–36.5)
MCV RBC AUTO: 87 FL (ref 78–100)
MONOCYTES # BLD AUTO: 1 10E3/UL (ref 0–1.3)
MONOCYTES NFR BLD AUTO: 10 %
NEUTROPHILS # BLD AUTO: 6.3 10E3/UL (ref 1.6–8.3)
NEUTROPHILS NFR BLD AUTO: 61 %
PLATELET # BLD AUTO: 286 10E3/UL (ref 150–450)
RBC # BLD AUTO: 5.47 10E6/UL (ref 4.4–5.9)
WBC # BLD AUTO: 10.3 10E3/UL (ref 4–11)

## 2025-05-21 PROCEDURE — 3078F DIAST BP <80 MM HG: CPT | Performed by: FAMILY MEDICINE

## 2025-05-21 PROCEDURE — 36415 COLL VENOUS BLD VENIPUNCTURE: CPT | Performed by: FAMILY MEDICINE

## 2025-05-21 PROCEDURE — 99213 OFFICE O/P EST LOW 20 MIN: CPT | Performed by: FAMILY MEDICINE

## 2025-05-21 PROCEDURE — 85025 COMPLETE CBC W/AUTO DIFF WBC: CPT | Performed by: FAMILY MEDICINE

## 2025-05-21 PROCEDURE — 3074F SYST BP LT 130 MM HG: CPT | Performed by: FAMILY MEDICINE

## 2025-05-21 NOTE — LETTER
May 21, 2025          Mark Conn I suggest that he be off work 5-22, 5-23 due to illness.          Vikas Lucas MD on 5/21/2025 at 7:28 PM          Electronically signed

## 2025-05-21 NOTE — PROGRESS NOTES
Urgent Care Clinic Visit    Chief Complaint   Patient presents with    Fever     6 days ago, Fever and diarrhea, fatigue, couldn't eat.  Today pt pooped a normal stool and felt fine most of the day but now feels as though he has a fever.  Pt reports he has asthma that is controlled.  Writer notes his breathing is labored and he is sweating.                  5/21/2025     6:41 PM   Additional Questions   Roomed by Kathe RYDER         5/21/2025   Forms   Any forms needing to be completed Yes         (R19.7) Diarrhea, unspecified type  (primary encounter diagnosis)  Comment:   Plan:     (R10.84) Abdominal pain, generalized  Comment:   Plan: CBC with platelets and differential              Discussion:    Patient initial way had diarrhea illness, possibly foodborne.  Then with limited diet, perhaps went into more of a constipation phase, recently relieved.  Abdominal exam relatively benign today and CBC unremarkable.  I have the impression that he is kind of turning the corner and ready to advance his diet and activity so I did suggest expectant management.  I did discuss with him indications to return which would be worsening abdominal pain, fever, vomiting, persistent diarrhea.        .CHIEF COMPLAINT    Diarrhea and abdominal pain.      HISTORY    This patient reports a 6-day history of abdominal symptoms.    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    6 days ago he developed watery diarrhea which lasted for a couple of days.  He took small amount of Imodium.  He remained kind of tired all day for a few days.  2 days ago he had pain along the sides of his abdomen.  He rested yesterday.  Today the side pain seemed to improve after a large bowel movement.    He still complains of diminished appetite at this time.      REVIEW OF SYSTEMS    Temperature 99.2 at his home today.  No sore throat or congestion.    No cough or  "shortness of breath.  No chest pain.  No urinary difficulty.      Patient Active Problem List   Diagnosis    Pain in limb    Colitis    Hypertriglyceridemia    Malabsorption of glucose    CARDIOVASCULAR SCREENING; LDL GOAL LESS THAN 130    Benign essential hypertension    Obesity    Chronic gout without tophus, unspecified cause, unspecified site    Restrictive lung disease    Moderate persistent asthma, unspecified whether complicated    Hypoglycemia    Obesity (BMI 35.0-39.9) with comorbidity (H)    SVT (supraventricular tachycardia)    Severe persistent asthma with exacerbation (H)       Current Outpatient Medications   Medication Sig Dispense Refill    albuterol (PROAIR HFA/PROVENTIL HFA/VENTOLIN HFA) 108 (90 Base) MCG/ACT inhaler Inhale 2 puffs into the lungs every 6 hours as needed for shortness of breath or wheezing 18 g 3    allopurinol (ZYLOPRIM) 300 MG tablet Take 1 tablet (300 mg) by mouth daily. 90 tablet 3    fluticasone (ARNUITY ELLIPTA) 200 MCG/ACT inhaler Inhale 1 puff into the lungs daily 30 each 11    gabapentin (NEURONTIN) 300 MG capsule Take 1 capsule (300 mg) by mouth at bedtime. 90 capsule 2    gemfibrozil (LOPID) 600 MG tablet Take 1 tablet (600 mg) by mouth 2 times daily (before meals). 180 tablet 1    lisinopril (ZESTRIL) 20 MG tablet TAKE 1 TABLET (20 MG) BY MOUTH DAILY 90 tablet 3    metoprolol succinate ER (TOPROL XL) 50 MG 24 hr tablet Take 1 tablet (50 mg) by mouth daily. 90 tablet 2    montelukast (SINGULAIR) 10 MG tablet Take 1 tablet (10 mg) by mouth at bedtime. 90 tablet 2    multivitamin w/minerals (THERA-VIT-M) tablet Take 1 tablet by mouth 2 times daily.      Omega-3 Fatty Acids (OMEGA-3 FISH OIL PO)       omeprazole (PRILOSEC) 40 MG DR capsule Take 1 capsule (40 mg) by mouth daily. 30 capsule 10               EXAM  /77   Pulse 55   Temp 98.1  F (36.7  C) (Tympanic)   Resp 18   Ht 1.905 m (6' 3\")   Wt (!) 140.6 kg (310 lb)   SpO2 97%   BMI 38.75 kg/m        Large " man, not in distress.  Pharynx unremarkable.  No cervical adenopathy or mass.  Nonlabored breathing.  Abdomen nondistended, normal bowel sounds, mild tenderness mid right abdomen without guarding or mass.  Extremities unremarkable.      Results for orders placed or performed in visit on 05/21/25   CBC with platelets and differential     Status: None   Result Value Ref Range    WBC Count 10.3 4.0 - 11.0 10e3/uL    RBC Count 5.47 4.40 - 5.90 10e6/uL    Hemoglobin 16.3 13.3 - 17.7 g/dL    Hematocrit 47.7 40.0 - 53.0 %    MCV 87 78 - 100 fL    MCH 29.8 26.5 - 33.0 pg    MCHC 34.2 31.5 - 36.5 g/dL    RDW 12.9 10.0 - 15.0 %    Platelet Count 286 150 - 450 10e3/uL    % Neutrophils 61 %    % Lymphocytes 26 %    % Monocytes 10 %    % Eosinophils 2 %    % Basophils 1 %    % Immature Granulocytes 0 %    Absolute Neutrophils 6.3 1.6 - 8.3 10e3/uL    Absolute Lymphocytes 2.7 0.8 - 5.3 10e3/uL    Absolute Monocytes 1.0 0.0 - 1.3 10e3/uL    Absolute Eosinophils 0.2 0.0 - 0.7 10e3/uL    Absolute Basophils 0.1 0.0 - 0.2 10e3/uL    Absolute Immature Granulocytes 0.0 <=0.4 10e3/uL   CBC with platelets and differential     Status: None    Narrative    The following orders were created for panel order CBC with platelets and differential.  Procedure                               Abnormality         Status                     ---------                               -----------         ------                     CBC with platelets and ...[[id number]]                      Final result                 Please view results for these tests on the individual orders.

## 2025-06-21 ENCOUNTER — OFFICE VISIT (OUTPATIENT)
Dept: URGENT CARE | Facility: URGENT CARE | Age: 50
End: 2025-06-21
Payer: COMMERCIAL

## 2025-06-21 VITALS
BODY MASS INDEX: 39.75 KG/M2 | HEART RATE: 109 BPM | WEIGHT: 315 LBS | SYSTOLIC BLOOD PRESSURE: 121 MMHG | TEMPERATURE: 97.3 F | RESPIRATION RATE: 16 BRPM | DIASTOLIC BLOOD PRESSURE: 85 MMHG | OXYGEN SATURATION: 95 %

## 2025-06-21 DIAGNOSIS — H66.002 NON-RECURRENT ACUTE SUPPURATIVE OTITIS MEDIA OF LEFT EAR WITHOUT SPONTANEOUS RUPTURE OF TYMPANIC MEMBRANE: Primary | ICD-10-CM

## 2025-06-21 PROCEDURE — 3074F SYST BP LT 130 MM HG: CPT | Performed by: NURSE PRACTITIONER

## 2025-06-21 PROCEDURE — 99213 OFFICE O/P EST LOW 20 MIN: CPT | Performed by: NURSE PRACTITIONER

## 2025-06-21 PROCEDURE — 3079F DIAST BP 80-89 MM HG: CPT | Performed by: NURSE PRACTITIONER

## 2025-06-21 RX ORDER — CIPROFLOXACIN AND DEXAMETHASONE 3; 1 MG/ML; MG/ML
4 SUSPENSION/ DROPS AURICULAR (OTIC) 2 TIMES DAILY
Qty: 7.5 ML | Refills: 0 | Status: SHIPPED | OUTPATIENT
Start: 2025-06-21 | End: 2025-07-01

## 2025-06-21 NOTE — PROGRESS NOTES
Urgent Care Clinic Visit    Chief Complaint   Patient presents with    Otalgia     Left ear pain X 5 days, started with pain, now muffled and pressure               6/21/2025     9:06 AM   Additional Questions   Roomed by Neelima CRAIG:   Mark Conn is a 49 year old male presenting with a chief complaint of   Chief Complaint   Patient presents with    Otalgia     Left ear pain X 5 days, started with pain, now muffled and pressure    Was having ear infections so saw ENT they found he had an infection deeper in the middle ear. They prescribed an ear drop that helped clear up ear symptoms. Has done well since that time; pt believes it was Feb.    Past Medical History:   Diagnosis Date    Other acne      Family History   Problem Relation Age of Onset    Hypertension Mother     Lipids Mother     Diabetes Mother     Thyroid Disease Father     Other Cancer Father 65        Gastric-spread to the bones, liver.    Diabetes Maternal Grandmother     Hypertension Maternal Grandfather     Diabetes Paternal Grandmother     Thyroid Disease Sister         Hyperthyroid.     Current Outpatient Medications   Medication Sig Dispense Refill    albuterol (PROAIR HFA/PROVENTIL HFA/VENTOLIN HFA) 108 (90 Base) MCG/ACT inhaler Inhale 2 puffs into the lungs every 6 hours as needed for shortness of breath or wheezing 18 g 3    allopurinol (ZYLOPRIM) 300 MG tablet Take 1 tablet (300 mg) by mouth daily. 90 tablet 3    fluticasone (ARNUITY ELLIPTA) 200 MCG/ACT inhaler Inhale 1 puff into the lungs daily 30 each 11    gabapentin (NEURONTIN) 300 MG capsule Take 1 capsule (300 mg) by mouth at bedtime. 90 capsule 2    gemfibrozil (LOPID) 600 MG tablet Take 1 tablet (600 mg) by mouth 2 times daily (before meals). 180 tablet 1    lisinopril (ZESTRIL) 20 MG tablet TAKE 1 TABLET (20 MG) BY MOUTH DAILY 90 tablet 3    metoprolol succinate ER (TOPROL XL) 50 MG 24 hr tablet Take 1 tablet (50 mg) by mouth daily. 90 tablet 2    montelukast  (SINGULAIR) 10 MG tablet Take 1 tablet (10 mg) by mouth at bedtime. 90 tablet 2    multivitamin w/minerals (THERA-VIT-M) tablet Take 1 tablet by mouth 2 times daily.      Omega-3 Fatty Acids (OMEGA-3 FISH OIL PO)       omeprazole (PRILOSEC) 40 MG DR capsule Take 1 capsule (40 mg) by mouth daily. 30 capsule 10     Social History     Tobacco Use    Smoking status: Never     Passive exposure: Past (Dad was a smoker; Mom was a social smoker)    Smokeless tobacco: Never   Substance Use Topics    Alcohol use: Yes     Comment: Sociallly- not very often.       OBJECTIVE  /85   Pulse 109   Temp 97.3  F (36.3  C) (Tympanic)   Resp 16   Wt (!) 144.2 kg (318 lb)   SpO2 95%   BMI 39.75 kg/m      Physical Exam  Constitutional:       Appearance: Normal appearance.   HENT:      Right Ear: Tympanic membrane, ear canal and external ear normal.      Left Ear: Tympanic membrane is erythematous and bulging.      Nose: Nose normal.      Mouth/Throat:      Mouth: Mucous membranes are moist.      Pharynx: Oropharynx is clear.   Eyes:      Extraocular Movements: Extraocular movements intact.      Conjunctiva/sclera: Conjunctivae normal.   Cardiovascular:      Rate and Rhythm: Normal rate and regular rhythm.      Heart sounds: Normal heart sounds.   Pulmonary:      Effort: Pulmonary effort is normal.      Breath sounds: Normal breath sounds.   Musculoskeletal:      Cervical back: Neck supple.   Skin:     General: Skin is warm and dry.   Neurological:      Mental Status: He is alert.             ASSESSMENT:    1. Non-recurrent acute suppurative otitis media of left ear without spontaneous rupture of tympanic membrane (Primary)  Given his history. Will treat with both oral and topical antibiotics.  - ciprofloxacin-dexAMETHasone (CIPRODEX) 0.3-0.1 % otic suspension; Place 4 drops Into the left ear 2 times daily for 10 days.  Dispense: 7.5 mL; Refill: 0  - amoxicillin-clavulanate (AUGMENTIN) 875-125 MG tablet; Take 1 tablet by mouth  2 times daily for 7 days.  Dispense: 14 tablet; Refill: 0      PLAN:  Ciprodex drops to left ear twice a day for 10 days.  Augmentin orally twice a day for 7 days.   If you develop another ear infection in the coming months would recommend recheck with ENT.

## 2025-06-23 ENCOUNTER — TELEPHONE (OUTPATIENT)
Dept: FAMILY MEDICINE | Facility: CLINIC | Age: 50
End: 2025-06-23
Payer: COMMERCIAL

## 2025-06-23 NOTE — TELEPHONE ENCOUNTER
Patient Quality Outreach    Patient is due for the following:   Asthma  -  ACT needed    Action(s) Taken:   Patient was assigned appropriate questionnaire to complete    Type of outreach:    Sent "Mobile Location, IP" message.    Questions for provider review:    None         Marcia Vazquez CMA  Chart routed to None.

## 2025-06-30 ENCOUNTER — PATIENT OUTREACH (OUTPATIENT)
Dept: CARE COORDINATION | Facility: CLINIC | Age: 50
End: 2025-06-30
Payer: COMMERCIAL

## 2025-07-02 ENCOUNTER — PATIENT OUTREACH (OUTPATIENT)
Dept: CARE COORDINATION | Facility: CLINIC | Age: 50
End: 2025-07-02
Payer: COMMERCIAL

## 2025-07-06 ENCOUNTER — HEALTH MAINTENANCE LETTER (OUTPATIENT)
Age: 50
End: 2025-07-06

## 2025-07-10 ENCOUNTER — PATIENT OUTREACH (OUTPATIENT)
Dept: CARE COORDINATION | Facility: CLINIC | Age: 50
End: 2025-07-10
Payer: COMMERCIAL

## 2025-08-25 DIAGNOSIS — J45.30 MILD PERSISTENT ASTHMA WITHOUT COMPLICATION: ICD-10-CM
